# Patient Record
Sex: FEMALE | Race: BLACK OR AFRICAN AMERICAN | NOT HISPANIC OR LATINO
[De-identification: names, ages, dates, MRNs, and addresses within clinical notes are randomized per-mention and may not be internally consistent; named-entity substitution may affect disease eponyms.]

---

## 2017-07-12 ENCOUNTER — APPOINTMENT (OUTPATIENT)
Dept: CV DIAGNOSITCS | Facility: HOSPITAL | Age: 57
End: 2017-07-12

## 2017-07-12 ENCOUNTER — OUTPATIENT (OUTPATIENT)
Dept: OUTPATIENT SERVICES | Facility: HOSPITAL | Age: 57
LOS: 1 days | End: 2017-07-12
Payer: COMMERCIAL

## 2017-07-12 DIAGNOSIS — I25.10 ATHEROSCLEROTIC HEART DISEASE OF NATIVE CORONARY ARTERY WITHOUT ANGINA PECTORIS: ICD-10-CM

## 2017-07-12 PROCEDURE — 93306 TTE W/DOPPLER COMPLETE: CPT | Mod: 26

## 2017-07-12 PROCEDURE — 93306 TTE W/DOPPLER COMPLETE: CPT

## 2017-07-24 ENCOUNTER — EMERGENCY (EMERGENCY)
Facility: HOSPITAL | Age: 57
LOS: 1 days | Discharge: HOME | End: 2017-07-24
Attending: EMERGENCY MEDICINE | Admitting: EMERGENCY MEDICINE
Payer: MEDICAID

## 2017-07-24 VITALS
RESPIRATION RATE: 18 BRPM | DIASTOLIC BLOOD PRESSURE: 65 MMHG | TEMPERATURE: 97 F | SYSTOLIC BLOOD PRESSURE: 120 MMHG | HEART RATE: 62 BPM | OXYGEN SATURATION: 100 %

## 2017-07-24 VITALS
TEMPERATURE: 98 F | DIASTOLIC BLOOD PRESSURE: 65 MMHG | SYSTOLIC BLOOD PRESSURE: 146 MMHG | OXYGEN SATURATION: 100 % | HEART RATE: 68 BPM | RESPIRATION RATE: 18 BRPM

## 2017-07-24 LAB
ALBUMIN SERPL ELPH-MCNC: 4 G/DL — SIGNIFICANT CHANGE UP (ref 3.3–5)
ALP SERPL-CCNC: 56 U/L — SIGNIFICANT CHANGE UP (ref 40–120)
ALT FLD-CCNC: 21 U/L — SIGNIFICANT CHANGE UP (ref 4–33)
AST SERPL-CCNC: 20 U/L — SIGNIFICANT CHANGE UP (ref 4–32)
BASOPHILS # BLD AUTO: 0.01 K/UL — SIGNIFICANT CHANGE UP (ref 0–0.2)
BASOPHILS NFR BLD AUTO: 0.1 % — SIGNIFICANT CHANGE UP (ref 0–2)
BILIRUB SERPL-MCNC: 0.2 MG/DL — SIGNIFICANT CHANGE UP (ref 0.2–1.2)
BUN SERPL-MCNC: 10 MG/DL — SIGNIFICANT CHANGE UP (ref 7–23)
CALCIUM SERPL-MCNC: 9.4 MG/DL — SIGNIFICANT CHANGE UP (ref 8.4–10.5)
CHLORIDE SERPL-SCNC: 95 MMOL/L — LOW (ref 98–107)
CO2 SERPL-SCNC: 25 MMOL/L — SIGNIFICANT CHANGE UP (ref 22–31)
CREAT SERPL-MCNC: 0.54 MG/DL — SIGNIFICANT CHANGE UP (ref 0.5–1.3)
EOSINOPHIL # BLD AUTO: 0.19 K/UL — SIGNIFICANT CHANGE UP (ref 0–0.5)
EOSINOPHIL NFR BLD AUTO: 2.6 % — SIGNIFICANT CHANGE UP (ref 0–6)
GLUCOSE SERPL-MCNC: 83 MG/DL — SIGNIFICANT CHANGE UP (ref 70–99)
HCT VFR BLD CALC: 32.9 % — LOW (ref 34.5–45)
HGB BLD-MCNC: 11.1 G/DL — LOW (ref 11.5–15.5)
IMM GRANULOCYTES # BLD AUTO: 0.04 # — SIGNIFICANT CHANGE UP
IMM GRANULOCYTES NFR BLD AUTO: 0.5 % — SIGNIFICANT CHANGE UP (ref 0–1.5)
LYMPHOCYTES # BLD AUTO: 1.62 K/UL — SIGNIFICANT CHANGE UP (ref 1–3.3)
LYMPHOCYTES # BLD AUTO: 22.1 % — SIGNIFICANT CHANGE UP (ref 13–44)
MCHC RBC-ENTMCNC: 29.5 PG — SIGNIFICANT CHANGE UP (ref 27–34)
MCHC RBC-ENTMCNC: 33.7 % — SIGNIFICANT CHANGE UP (ref 32–36)
MCV RBC AUTO: 87.5 FL — SIGNIFICANT CHANGE UP (ref 80–100)
MONOCYTES # BLD AUTO: 0.76 K/UL — SIGNIFICANT CHANGE UP (ref 0–0.9)
MONOCYTES NFR BLD AUTO: 10.4 % — SIGNIFICANT CHANGE UP (ref 2–14)
NEUTROPHILS # BLD AUTO: 4.7 K/UL — SIGNIFICANT CHANGE UP (ref 1.8–7.4)
NEUTROPHILS NFR BLD AUTO: 64.3 % — SIGNIFICANT CHANGE UP (ref 43–77)
NRBC # FLD: 0 — SIGNIFICANT CHANGE UP
PLATELET # BLD AUTO: 225 K/UL — SIGNIFICANT CHANGE UP (ref 150–400)
PMV BLD: 9.5 FL — SIGNIFICANT CHANGE UP (ref 7–13)
POTASSIUM SERPL-MCNC: 3.5 MMOL/L — SIGNIFICANT CHANGE UP (ref 3.5–5.3)
POTASSIUM SERPL-SCNC: 3.5 MMOL/L — SIGNIFICANT CHANGE UP (ref 3.5–5.3)
PROT SERPL-MCNC: 7.2 G/DL — SIGNIFICANT CHANGE UP (ref 6–8.3)
RBC # BLD: 3.76 M/UL — LOW (ref 3.8–5.2)
RBC # FLD: 13.8 % — SIGNIFICANT CHANGE UP (ref 10.3–14.5)
SODIUM SERPL-SCNC: 137 MMOL/L — SIGNIFICANT CHANGE UP (ref 135–145)
WBC # BLD: 7.32 K/UL — SIGNIFICANT CHANGE UP (ref 3.8–10.5)
WBC # FLD AUTO: 7.32 K/UL — SIGNIFICANT CHANGE UP (ref 3.8–10.5)

## 2017-07-24 PROCEDURE — 99284 EMERGENCY DEPT VISIT MOD MDM: CPT

## 2017-07-24 PROCEDURE — 70481 CT ORBIT/EAR/FOSSA W/DYE: CPT | Mod: 26

## 2017-07-24 RX ORDER — DIPHENHYDRAMINE HCL 50 MG
50 CAPSULE ORAL ONCE
Qty: 0 | Refills: 0 | Status: COMPLETED | OUTPATIENT
Start: 2017-07-24 | End: 2017-07-24

## 2017-07-24 RX ADMIN — Medication 50 MILLIGRAM(S): at 21:36

## 2017-07-24 NOTE — ED PROVIDER NOTE - MEDICAL DECISION MAKING DETAILS
likely allergic in nature, clinically very unlikely periorbital or orbital cellulitis. however, pt's son requesting CT anyway. will get labs, ct. benadryl.

## 2017-07-24 NOTE — ED PROVIDER NOTE - FAMILY HISTORY
Father  Still living? Unknown  Family history of diabetes mellitus (DM), Age at diagnosis: Age Unknown     Mother  Still living? Unknown  Family history of cancer, Age at diagnosis: Age Unknown

## 2017-07-24 NOTE — ED PROVIDER NOTE - PROGRESS NOTE DETAILS
labs normal, pending ct. Stewart note:  sts is improved for patient.  Ct orbits show cellulitis.. will treat w clindamycin 300 mg tid, prednisone 40 qd x 4 day. and benadryl 25 every six hours

## 2017-07-24 NOTE — ED PROVIDER NOTE - CARE PLAN
Principal Discharge DX:	Periorbital edema of right eye Principal Discharge DX:	Periorbital edema of right eye  Instructions for follow-up, activity and diet:	Follow up with your pmd.  Take clindamycin 300 mg 3 x a day.  Take prednisone 40mg daily for 4 days.  Take benadryl every 6 hours for swelling.

## 2017-07-24 NOTE — ED PROVIDER NOTE - PLAN OF CARE
Follow up with your pmd.  Take clindamycin 300 mg 3 x a day.  Take prednisone 40mg daily for 4 days.  Take benadryl every 6 hours for swelling.

## 2017-07-24 NOTE — ED PROVIDER NOTE - SHIFT CHANGE DETAILS
I have signed over this patient to the above attending physician. Pertinent history, physical exam findings and workup thus far in the ED have been discussed. The pending tests and plan, including ct results were signed over.  All questions from the above attending physician have been answered.

## 2017-07-24 NOTE — ED PROVIDER NOTE - OBJECTIVE STATEMENT
57f, pmhx htn, dm. c/o right eye swelling x 1 month. got worse last night, so went to PMD who sent her to ED for a CT r/o orbital cellulitis. swelling above the eyelid has since resolved but below remains. no f/c, minimal pain. no insect bite exposures. no h/a, no visuyal changhes, no eye redness. no pain with eom.

## 2017-07-25 RX ADMIN — Medication 125 MILLIGRAM(S): at 01:25

## 2017-10-02 ENCOUNTER — APPOINTMENT (OUTPATIENT)
Dept: INTERNAL MEDICINE | Facility: CLINIC | Age: 57
End: 2017-10-02

## 2019-01-10 ENCOUNTER — RESULT REVIEW (OUTPATIENT)
Age: 59
End: 2019-01-10

## 2019-04-17 NOTE — ED PROVIDER NOTE - SHIFT CHANGE
Problem: Mobility Impaired (Adult and Pediatric) Goal: *Acute Goals and Plan of Care (Insert Text) Description Physical Therapy Goals Initiated 4/15/2019 1. Patient will move from supine to sit and sit to supine , scoot up and down and roll side to side in bed with modified independence within 7 day(s). 2.  Patient will transfer from bed to chair and chair to bed with modified independence using the least restrictive device within 7 day(s). 3.  Patient will perform sit to stand with modified independence within 7 day(s). 4.  Patient will ambulate with minimal assistance/contact guard assist for 5 feet with rolling walker within 7 day(s). Note: PHYSICAL THERAPY TREATMENT Patient: Chente Pinedo (27 y.o. female) Date: 4/17/2019 Diagnosis: UTI (urinary tract infection) [N39.0] Hypertensive urgency [I16.0] Hyperglycemia [R73.9] Candidal intertrigo [B37.2] UTI (urinary tract infection) [N39.0] UTI (urinary tract infection) Precautions:   
Chart, physical therapy assessment, plan of care and goals were reviewed. ASSESSMENT: 
Pt comes to sit with min to mod assist.Pt to stand with bed elevated min assist of 2. Pt took 4 steps to bedside chair with RW min assist of 2. Pt left siting. Pt is unsteady needing cues for upright posture. Pt progressing slowly. continue goals. Progression toward goals: 
?    Improving appropriately and progressing toward goals ? Improving slowly and progressing toward goals ? Not making progress toward goals and plan of care will be adjusted PLAN: 
Patient continues to benefit from skilled intervention to address the above impairments. Continue treatment per established plan of care. Discharge Recommendations:  Rodriguez Hagan Further Equipment Recommendations for Discharge:  rolling walker SUBJECTIVE:  
 
 
OBJECTIVE DATA SUMMARY:  
Critical Behavior: 
Neurologic State: Alert, Eyes open spontaneously Orientation Level: Oriented X4, Appropriate for age Cognition: Appropriate decision making, Appropriate for age attention/concentration, Appropriate safety awareness, Follows commands Safety/Judgement: Awareness of environment Functional Mobility Training: 
Bed Mobility: 
  
Supine to Sit: Minimum assistance; Moderate assistance Transfers: 
Sit to Stand: Minimum assistance;Assist x2 Stand to Sit: Minimum assistance;Assist x2 Balance: 
Sitting: Intact Standing: Impaired; With support Pain: 
Pain Scale 1: Numeric (0 - 10) Pain Intensity 1: 0 Pain Location 1: Head 
Pain Orientation 1: Anterior Pain Description 1: Aching Pain Intervention(s) 1: Medication (see MAR) Activity Tolerance:  
Pt tolerated treatment fairly well. Please refer to the flowsheet for vital signs taken during this treatment. After treatment:  
?    Patient left in no apparent distress sitting up in chair ? Patient left in no apparent distress in bed 
? Call bell left within reach ? Nursing notified ? Caregiver present ? Bed alarm activated COMMUNICATION/COLLABORATION:  
The patient?s plan of care was discussed with: Physical Therapist 
 
Barber Clemons PTA Time Calculation: 23 mins Yes...

## 2019-07-01 ENCOUNTER — OUTPATIENT (OUTPATIENT)
Dept: OUTPATIENT SERVICES | Facility: HOSPITAL | Age: 59
LOS: 1 days | End: 2019-07-01
Payer: MEDICAID

## 2019-07-01 PROCEDURE — G9001: CPT

## 2019-07-25 ENCOUNTER — INPATIENT (INPATIENT)
Facility: HOSPITAL | Age: 59
LOS: 0 days | Discharge: ANOTHER HOSPITAL | End: 2019-07-26
Attending: INTERNAL MEDICINE | Admitting: INTERNAL MEDICINE
Payer: MEDICAID

## 2019-07-25 VITALS
OXYGEN SATURATION: 100 % | DIASTOLIC BLOOD PRESSURE: 80 MMHG | RESPIRATION RATE: 16 BRPM | HEART RATE: 66 BPM | SYSTOLIC BLOOD PRESSURE: 168 MMHG | TEMPERATURE: 98 F

## 2019-07-25 DIAGNOSIS — I10 ESSENTIAL (PRIMARY) HYPERTENSION: ICD-10-CM

## 2019-07-25 DIAGNOSIS — E11.9 TYPE 2 DIABETES MELLITUS WITHOUT COMPLICATIONS: ICD-10-CM

## 2019-07-25 DIAGNOSIS — R55 SYNCOPE AND COLLAPSE: ICD-10-CM

## 2019-07-25 LAB
ALBUMIN SERPL ELPH-MCNC: 4.4 G/DL — SIGNIFICANT CHANGE UP (ref 3.3–5)
ALP SERPL-CCNC: 64 U/L — SIGNIFICANT CHANGE UP (ref 40–120)
ALT FLD-CCNC: 23 U/L — SIGNIFICANT CHANGE UP (ref 4–33)
ANION GAP SERPL CALC-SCNC: 16 MMO/L — HIGH (ref 7–14)
AST SERPL-CCNC: 24 U/L — SIGNIFICANT CHANGE UP (ref 4–32)
BASOPHILS # BLD AUTO: 0.03 K/UL — SIGNIFICANT CHANGE UP (ref 0–0.2)
BASOPHILS NFR BLD AUTO: 0.4 % — SIGNIFICANT CHANGE UP (ref 0–2)
BILIRUB SERPL-MCNC: 0.4 MG/DL — SIGNIFICANT CHANGE UP (ref 0.2–1.2)
BUN SERPL-MCNC: 10 MG/DL — SIGNIFICANT CHANGE UP (ref 7–23)
CALCIUM SERPL-MCNC: 9.4 MG/DL — SIGNIFICANT CHANGE UP (ref 8.4–10.5)
CHLORIDE SERPL-SCNC: 96 MMOL/L — LOW (ref 98–107)
CO2 SERPL-SCNC: 24 MMOL/L — SIGNIFICANT CHANGE UP (ref 22–31)
CREAT SERPL-MCNC: 0.52 MG/DL — SIGNIFICANT CHANGE UP (ref 0.5–1.3)
EOSINOPHIL # BLD AUTO: 0.22 K/UL — SIGNIFICANT CHANGE UP (ref 0–0.5)
EOSINOPHIL NFR BLD AUTO: 2.9 % — SIGNIFICANT CHANGE UP (ref 0–6)
GLUCOSE SERPL-MCNC: 161 MG/DL — HIGH (ref 70–99)
HCT VFR BLD CALC: 39.2 % — SIGNIFICANT CHANGE UP (ref 34.5–45)
HGB BLD-MCNC: 13 G/DL — SIGNIFICANT CHANGE UP (ref 11.5–15.5)
IMM GRANULOCYTES NFR BLD AUTO: 0.4 % — SIGNIFICANT CHANGE UP (ref 0–1.5)
LYMPHOCYTES # BLD AUTO: 1.65 K/UL — SIGNIFICANT CHANGE UP (ref 1–3.3)
LYMPHOCYTES # BLD AUTO: 22 % — SIGNIFICANT CHANGE UP (ref 13–44)
MCHC RBC-ENTMCNC: 29.1 PG — SIGNIFICANT CHANGE UP (ref 27–34)
MCHC RBC-ENTMCNC: 33.2 % — SIGNIFICANT CHANGE UP (ref 32–36)
MCV RBC AUTO: 87.9 FL — SIGNIFICANT CHANGE UP (ref 80–100)
MONOCYTES # BLD AUTO: 0.78 K/UL — SIGNIFICANT CHANGE UP (ref 0–0.9)
MONOCYTES NFR BLD AUTO: 10.4 % — SIGNIFICANT CHANGE UP (ref 2–14)
NEUTROPHILS # BLD AUTO: 4.78 K/UL — SIGNIFICANT CHANGE UP (ref 1.8–7.4)
NEUTROPHILS NFR BLD AUTO: 63.9 % — SIGNIFICANT CHANGE UP (ref 43–77)
NRBC # FLD: 0 K/UL — SIGNIFICANT CHANGE UP (ref 0–0)
PLATELET # BLD AUTO: 244 K/UL — SIGNIFICANT CHANGE UP (ref 150–400)
PMV BLD: 8.4 FL — SIGNIFICANT CHANGE UP (ref 7–13)
POTASSIUM SERPL-MCNC: 3.4 MMOL/L — LOW (ref 3.5–5.3)
POTASSIUM SERPL-SCNC: 3.4 MMOL/L — LOW (ref 3.5–5.3)
PROT SERPL-MCNC: 7.6 G/DL — SIGNIFICANT CHANGE UP (ref 6–8.3)
RBC # BLD: 4.46 M/UL — SIGNIFICANT CHANGE UP (ref 3.8–5.2)
RBC # FLD: 13.3 % — SIGNIFICANT CHANGE UP (ref 10.3–14.5)
SODIUM SERPL-SCNC: 136 MMOL/L — SIGNIFICANT CHANGE UP (ref 135–145)
TROPONIN T, HIGH SENSITIVITY: 46 NG/L — SIGNIFICANT CHANGE UP (ref ?–14)
TROPONIN T, HIGH SENSITIVITY: 47 NG/L — SIGNIFICANT CHANGE UP (ref ?–14)
WBC # BLD: 7.49 K/UL — SIGNIFICANT CHANGE UP (ref 3.8–10.5)
WBC # FLD AUTO: 7.49 K/UL — SIGNIFICANT CHANGE UP (ref 3.8–10.5)

## 2019-07-25 PROCEDURE — 71046 X-RAY EXAM CHEST 2 VIEWS: CPT | Mod: 26

## 2019-07-25 PROCEDURE — 70450 CT HEAD/BRAIN W/O DYE: CPT | Mod: 26

## 2019-07-25 PROCEDURE — 93306 TTE W/DOPPLER COMPLETE: CPT | Mod: 26

## 2019-07-25 RX ORDER — SODIUM CHLORIDE 9 MG/ML
1000 INJECTION, SOLUTION INTRAVENOUS
Refills: 0 | Status: DISCONTINUED | OUTPATIENT
Start: 2019-07-25 | End: 2019-07-26

## 2019-07-25 RX ORDER — GLUCAGON INJECTION, SOLUTION 0.5 MG/.1ML
1 INJECTION, SOLUTION SUBCUTANEOUS ONCE
Refills: 0 | Status: DISCONTINUED | OUTPATIENT
Start: 2019-07-25 | End: 2019-07-26

## 2019-07-25 RX ORDER — POTASSIUM CHLORIDE 20 MEQ
40 PACKET (EA) ORAL ONCE
Refills: 0 | Status: COMPLETED | OUTPATIENT
Start: 2019-07-25 | End: 2019-07-25

## 2019-07-25 RX ORDER — DEXTROSE 50 % IN WATER 50 %
15 SYRINGE (ML) INTRAVENOUS ONCE
Refills: 0 | Status: DISCONTINUED | OUTPATIENT
Start: 2019-07-25 | End: 2019-07-26

## 2019-07-25 RX ORDER — HEPARIN SODIUM 5000 [USP'U]/ML
5000 INJECTION INTRAVENOUS; SUBCUTANEOUS
Refills: 0 | Status: DISCONTINUED | OUTPATIENT
Start: 2019-07-25 | End: 2019-07-26

## 2019-07-25 RX ORDER — ACETAMINOPHEN 500 MG
975 TABLET ORAL ONCE
Refills: 0 | Status: COMPLETED | OUTPATIENT
Start: 2019-07-25 | End: 2019-07-25

## 2019-07-25 RX ORDER — DEXTROSE 50 % IN WATER 50 %
25 SYRINGE (ML) INTRAVENOUS ONCE
Refills: 0 | Status: DISCONTINUED | OUTPATIENT
Start: 2019-07-25 | End: 2019-07-26

## 2019-07-25 RX ORDER — ATORVASTATIN CALCIUM 80 MG/1
10 TABLET, FILM COATED ORAL AT BEDTIME
Refills: 0 | Status: DISCONTINUED | OUTPATIENT
Start: 2019-07-25 | End: 2019-07-26

## 2019-07-25 RX ORDER — ESCITALOPRAM OXALATE 10 MG/1
10 TABLET, FILM COATED ORAL DAILY
Refills: 0 | Status: DISCONTINUED | OUTPATIENT
Start: 2019-07-25 | End: 2019-07-26

## 2019-07-25 RX ORDER — ACETAMINOPHEN 500 MG
650 TABLET ORAL ONCE
Refills: 0 | Status: COMPLETED | OUTPATIENT
Start: 2019-07-25 | End: 2019-07-25

## 2019-07-25 RX ORDER — ASPIRIN/CALCIUM CARB/MAGNESIUM 324 MG
81 TABLET ORAL DAILY
Refills: 0 | Status: DISCONTINUED | OUTPATIENT
Start: 2019-07-25 | End: 2019-07-26

## 2019-07-25 RX ORDER — INSULIN LISPRO 100/ML
VIAL (ML) SUBCUTANEOUS
Refills: 0 | Status: DISCONTINUED | OUTPATIENT
Start: 2019-07-25 | End: 2019-07-26

## 2019-07-25 RX ORDER — DEXTROSE 50 % IN WATER 50 %
12.5 SYRINGE (ML) INTRAVENOUS ONCE
Refills: 0 | Status: DISCONTINUED | OUTPATIENT
Start: 2019-07-25 | End: 2019-07-26

## 2019-07-25 RX ORDER — LISINOPRIL 2.5 MG/1
10 TABLET ORAL DAILY
Refills: 0 | Status: DISCONTINUED | OUTPATIENT
Start: 2019-07-25 | End: 2019-07-26

## 2019-07-25 RX ORDER — HYDROCHLOROTHIAZIDE 25 MG
25 TABLET ORAL DAILY
Refills: 0 | Status: DISCONTINUED | OUTPATIENT
Start: 2019-07-25 | End: 2019-07-26

## 2019-07-25 RX ORDER — GABAPENTIN 400 MG/1
300 CAPSULE ORAL DAILY
Refills: 0 | Status: DISCONTINUED | OUTPATIENT
Start: 2019-07-25 | End: 2019-07-26

## 2019-07-25 RX ADMIN — ATORVASTATIN CALCIUM 10 MILLIGRAM(S): 80 TABLET, FILM COATED ORAL at 22:08

## 2019-07-25 RX ADMIN — Medication 650 MILLIGRAM(S): at 10:07

## 2019-07-25 RX ADMIN — Medication 650 MILLIGRAM(S): at 10:37

## 2019-07-25 RX ADMIN — Medication 40 MILLIEQUIVALENT(S): at 14:52

## 2019-07-25 RX ADMIN — LISINOPRIL 10 MILLIGRAM(S): 2.5 TABLET ORAL at 16:30

## 2019-07-25 RX ADMIN — GABAPENTIN 300 MILLIGRAM(S): 400 CAPSULE ORAL at 16:30

## 2019-07-25 RX ADMIN — Medication 25 MILLIGRAM(S): at 16:30

## 2019-07-25 RX ADMIN — Medication 81 MILLIGRAM(S): at 16:30

## 2019-07-25 NOTE — ED ADULT NURSE NOTE - CHIEF COMPLAINT QUOTE
pt. BIBA from home, per son, pt. was in a "heated argument" w/ her daughter, slumped down on the couch and her arms/legs began to shake and she was observed to be diaphoretic. Son suspects pt. may be in psychogenic shock bc this event has occurred once before and was brought upon by anxiety as well. Pt. slow to answer questions, reports headache, shoulder pain and "uneasiness" in the chest. PMHx HTN, DM.  # [id number].

## 2019-07-25 NOTE — ED PROVIDER NOTE - OBJECTIVE STATEMENT
Shadi RYAN MD PGY2: 59 F PMH HLD HTN here for syncopal episode and consequent head, neck and chest discomfort all much improved since coming here. Patient was arguing with her daughter in a heated argument when she sat down and her eyes rolled back and slowly regained consciousness while her son called 911. States that this happened a few years ago under similar circumstances. Upon awakening was complaining of head, neck and chest discomfort, all of which has improved but not eliminated. Translation and collateral information provided by patient's son.

## 2019-07-25 NOTE — H&P ADULT - ATTENDING COMMENTS
Patient seen and examined.  Agree with above.   -60 yo F with HTN, HLD admitted with chest pain, diaphoresis, near syncope after getting in an argument with a family member  -Troponin indeterminate; pt. currently chest pain free  -Check CTH   -Check TTE and NST    Talita Mendoza MD

## 2019-07-25 NOTE — ED PROVIDER NOTE - ATTENDING CONTRIBUTION TO CARE
MD Garnica:  I performed a face to face bedside interview with patient regarding history of present illness, review of symptoms and past medical history. I completed an independent physical exam(documented below).  I have discussed patient's plan of care with resident.   I agree with note as stated above, having amended the EMR as needed to reflect my findings. I have discussed the assessment and plan of care.  This includes during the time I functioned as the attending physician for this patient.  PE:  Gen: Alert, NAD  Head: NC, AT,  EOMI, normal lids/conjunctiva  ENT:  normal hearing, patent oropharynx without erythema/exudate  Neck: +supple, no tenderness/meningismus/JVD, +Trachea midline  Chest: no chest wall tenderness, equal chest rise  Pulm: Bilateral BS, normal resp effort, no wheeze/stridor/retractions  CV: RRR, no M/R/G, +dist pulses  Abd: +BS, soft, NT/ND  Rectal: deferred  Mskel: no edema/erythema/cyanosis  Skin: no rash  Neuro: AAOx3, no sensory/motor deficits, CN 2-12 intact   MDM:   58yo F w/ pmh htn, hld, head/neck/chest discomfort during a verbal altercation with daughter. Pt reportedly sat down mid argument at approx 12am, "eyes rolled back", ?LOC, no seizure like activity/bowel or bladder incontinence. no recent head trauma, not on AC. ECG, labs, admission for syncope/ACS w/u.

## 2019-07-25 NOTE — H&P ADULT - HISTORY OF PRESENT ILLNESS
60 yo F HTN, chol, DM here for near syncope. Patient was arguing with her daughter at 12am when +acute onset nausea, diaphoresis, palpitations, dizziness, and mild chest pressure. As per son no LOC.  NO chest pain, SOB, AGARWAL, PND, orthopnea, syncope, increased lower extremity edema, fever chills, malaise, myalgias, anorexia, weight changes ( loss or gain), night sweats, generalized fatigue abdominal pain, N/V/C/D BRBPR, melena, urinary symptoms, cough, and wheezing.

## 2019-07-25 NOTE — ED PROVIDER NOTE - PMH
Benign hypertension    Diabetes mellitus, type II    Gall stone pancreatitis  S/P laparoscopic cholecystectomy  Low back pain

## 2019-07-25 NOTE — ED ADULT NURSE NOTE - OBJECTIVE STATEMENT
pt a&Ox3, had a "heated argument" with daughter as per son at bedside whom pt is requesting to translate. pt slumped down in chair, as per son denies LOC or head trauma, pt eyes remained open and pts UE & LE were "shaking." pt states she does not remember the events. pt has had a hx of one episode like this in the past. pt has hx of htn, dm, anxiety. 20 G IV placedin L AC, labs sent, will continue to monitor.

## 2019-07-25 NOTE — ED ADULT TRIAGE NOTE - CHIEF COMPLAINT QUOTE
pt. BIBA from home, per son, pt. was in a "heated argument" w/ her daughter, slumped down on the couch and her arms/legs began to shake and she was observed to be diaphoretic. Son suspects pt. may be in psychogenic shock bc this event has occurred once before and was brought upon by anxiety as well. Pt. slow to answer questions, reports headache, shoulder pain and "uneasiness" in the chest. PMHx HTN, DM.  # [id number].

## 2019-07-25 NOTE — ED PROVIDER NOTE - PHYSICAL EXAMINATION
Shadi RYAN MD PGY2:   PHYSICAL EXAM:    GENERAL: Thin older woman, NAD  HEENT:  Atraumatic, Normocephalic  CHEST/LUNG: Chest rise equal bilaterally. CTAB.   HEART: Regular rate and rhythm. No murmurs.   ABDOMEN: Soft, Nontender, Nondistended  EXTREMITIES:  2+ Peripheral Pulses.  PSYCH: A&Ox3  SKIN: No obvious rashes or lesions

## 2019-07-25 NOTE — H&P ADULT - PROBLEM SELECTOR PLAN 1
r/o acute coronary syndrome   check orthostatics  TTE ordered  TST ordered  CE x 3, telemetry , serial EKG's  continue asa, lipitor  case discussed extensively with Dr Mendoza

## 2019-07-25 NOTE — H&P ADULT - NSICDXPASTMEDICALHX_GEN_ALL_CORE_FT
PAST MEDICAL HISTORY:  Benign hypertension     Diabetes mellitus, type II     Gall stone pancreatitis S/P laparoscopic cholecystectomy    Low back pain

## 2019-07-26 ENCOUNTER — INPATIENT (INPATIENT)
Facility: HOSPITAL | Age: 59
LOS: 6 days | Discharge: HOME | DRG: 236 | End: 2019-08-02
Attending: THORACIC SURGERY (CARDIOTHORACIC VASCULAR SURGERY) | Admitting: THORACIC SURGERY (CARDIOTHORACIC VASCULAR SURGERY)
Payer: MEDICAID

## 2019-07-26 ENCOUNTER — TRANSCRIPTION ENCOUNTER (OUTPATIENT)
Age: 59
End: 2019-07-26

## 2019-07-26 VITALS — RESPIRATION RATE: 17 BRPM | OXYGEN SATURATION: 100 % | HEART RATE: 64 BPM

## 2019-07-26 VITALS
HEART RATE: 63 BPM | DIASTOLIC BLOOD PRESSURE: 88 MMHG | TEMPERATURE: 99 F | SYSTOLIC BLOOD PRESSURE: 158 MMHG | RESPIRATION RATE: 20 BRPM | OXYGEN SATURATION: 98 %

## 2019-07-26 DIAGNOSIS — I25.10 ATHEROSCLEROTIC HEART DISEASE OF NATIVE CORONARY ARTERY WITHOUT ANGINA PECTORIS: ICD-10-CM

## 2019-07-26 DIAGNOSIS — Z29.9 ENCOUNTER FOR PROPHYLACTIC MEASURES, UNSPECIFIED: ICD-10-CM

## 2019-07-26 DIAGNOSIS — Z71.89 OTHER SPECIFIED COUNSELING: ICD-10-CM

## 2019-07-26 LAB
ANION GAP SERPL CALC-SCNC: 12 MMO/L — SIGNIFICANT CHANGE UP (ref 7–14)
ANION GAP SERPL CALC-SCNC: 13 MMO/L — SIGNIFICANT CHANGE UP (ref 7–14)
BASOPHILS # BLD AUTO: 0 K/UL — SIGNIFICANT CHANGE UP (ref 0–0.2)
BASOPHILS # BLD AUTO: 0.04 K/UL — SIGNIFICANT CHANGE UP (ref 0–0.2)
BASOPHILS NFR BLD AUTO: 0.1 % — SIGNIFICANT CHANGE UP (ref 0–2)
BASOPHILS NFR BLD AUTO: 0.8 % — SIGNIFICANT CHANGE UP (ref 0–2)
BLD GP AB SCN SERPL QL: NEGATIVE — SIGNIFICANT CHANGE UP
BUN SERPL-MCNC: 11 MG/DL — SIGNIFICANT CHANGE UP (ref 7–23)
BUN SERPL-MCNC: 14 MG/DL — SIGNIFICANT CHANGE UP (ref 7–23)
CALCIUM SERPL-MCNC: 10.1 MG/DL — SIGNIFICANT CHANGE UP (ref 8.4–10.5)
CALCIUM SERPL-MCNC: 9.1 MG/DL — SIGNIFICANT CHANGE UP (ref 8.4–10.5)
CHLORIDE SERPL-SCNC: 96 MMOL/L — LOW (ref 98–107)
CHLORIDE SERPL-SCNC: 98 MMOL/L — SIGNIFICANT CHANGE UP (ref 98–107)
CHOLEST SERPL-MCNC: 128 MG/DL — SIGNIFICANT CHANGE UP (ref 120–199)
CO2 SERPL-SCNC: 26 MMOL/L — SIGNIFICANT CHANGE UP (ref 22–31)
CO2 SERPL-SCNC: 26 MMOL/L — SIGNIFICANT CHANGE UP (ref 22–31)
CREAT SERPL-MCNC: 0.49 MG/DL — LOW (ref 0.5–1.3)
CREAT SERPL-MCNC: 0.55 MG/DL — SIGNIFICANT CHANGE UP (ref 0.5–1.3)
EOSINOPHIL # BLD AUTO: 0.1 K/UL — SIGNIFICANT CHANGE UP (ref 0–0.5)
EOSINOPHIL # BLD AUTO: 0.28 K/UL — SIGNIFICANT CHANGE UP (ref 0–0.5)
EOSINOPHIL NFR BLD AUTO: 1.8 % — SIGNIFICANT CHANGE UP (ref 0–6)
EOSINOPHIL NFR BLD AUTO: 5.5 % — SIGNIFICANT CHANGE UP (ref 0–6)
GLUCOSE BLDC GLUCOMTR-MCNC: 118 MG/DL — HIGH (ref 70–99)
GLUCOSE BLDC GLUCOMTR-MCNC: 172 MG/DL — HIGH (ref 70–99)
GLUCOSE SERPL-MCNC: 127 MG/DL — HIGH (ref 70–99)
GLUCOSE SERPL-MCNC: 156 MG/DL — HIGH (ref 70–99)
HBA1C BLD-MCNC: 6.9 % — HIGH (ref 4–5.6)
HCT VFR BLD CALC: 38.1 % — SIGNIFICANT CHANGE UP (ref 34.5–45)
HCT VFR BLD CALC: 39.6 % — SIGNIFICANT CHANGE UP (ref 34.5–45)
HDLC SERPL-MCNC: 62 MG/DL — SIGNIFICANT CHANGE UP (ref 45–65)
HGB BLD-MCNC: 12.3 G/DL — SIGNIFICANT CHANGE UP (ref 11.5–15.5)
HGB BLD-MCNC: 13.2 G/DL — SIGNIFICANT CHANGE UP (ref 11.5–15.5)
IMM GRANULOCYTES NFR BLD AUTO: 0.4 % — SIGNIFICANT CHANGE UP (ref 0–1.5)
LIPID PNL WITH DIRECT LDL SERPL: 60 MG/DL — SIGNIFICANT CHANGE UP
LYMPHOCYTES # BLD AUTO: 1.5 K/UL — SIGNIFICANT CHANGE UP (ref 1–3.3)
LYMPHOCYTES # BLD AUTO: 1.9 K/UL — SIGNIFICANT CHANGE UP (ref 1–3.3)
LYMPHOCYTES # BLD AUTO: 26.8 % — SIGNIFICANT CHANGE UP (ref 13–44)
LYMPHOCYTES # BLD AUTO: 29.3 % — SIGNIFICANT CHANGE UP (ref 13–44)
MAGNESIUM SERPL-MCNC: 1.9 MG/DL — SIGNIFICANT CHANGE UP (ref 1.6–2.6)
MAGNESIUM SERPL-MCNC: 2 MG/DL — SIGNIFICANT CHANGE UP (ref 1.6–2.6)
MCHC RBC-ENTMCNC: 28.8 PG — SIGNIFICANT CHANGE UP (ref 27–34)
MCHC RBC-ENTMCNC: 29.9 PG — SIGNIFICANT CHANGE UP (ref 27–34)
MCHC RBC-ENTMCNC: 32.3 % — SIGNIFICANT CHANGE UP (ref 32–36)
MCHC RBC-ENTMCNC: 33.2 GM/DL — SIGNIFICANT CHANGE UP (ref 32–36)
MCV RBC AUTO: 89.2 FL — SIGNIFICANT CHANGE UP (ref 80–100)
MCV RBC AUTO: 90.1 FL — SIGNIFICANT CHANGE UP (ref 80–100)
MONOCYTES # BLD AUTO: 0.65 K/UL — SIGNIFICANT CHANGE UP (ref 0–0.9)
MONOCYTES # BLD AUTO: 0.8 K/UL — SIGNIFICANT CHANGE UP (ref 0–0.9)
MONOCYTES NFR BLD AUTO: 10.5 % — SIGNIFICANT CHANGE UP (ref 2–14)
MONOCYTES NFR BLD AUTO: 12.7 % — SIGNIFICANT CHANGE UP (ref 2–14)
NEUTROPHILS # BLD AUTO: 2.63 K/UL — SIGNIFICANT CHANGE UP (ref 1.8–7.4)
NEUTROPHILS # BLD AUTO: 4.4 K/UL — SIGNIFICANT CHANGE UP (ref 1.8–7.4)
NEUTROPHILS NFR BLD AUTO: 51.3 % — SIGNIFICANT CHANGE UP (ref 43–77)
NEUTROPHILS NFR BLD AUTO: 60.9 % — SIGNIFICANT CHANGE UP (ref 43–77)
NRBC # FLD: 0 K/UL — SIGNIFICANT CHANGE UP (ref 0–0)
PHOSPHATE SERPL-MCNC: 2.9 MG/DL — SIGNIFICANT CHANGE UP (ref 2.5–4.5)
PLATELET # BLD AUTO: 230 K/UL — SIGNIFICANT CHANGE UP (ref 150–400)
PLATELET # BLD AUTO: 267 K/UL — SIGNIFICANT CHANGE UP (ref 150–400)
PMV BLD: 8.6 FL — SIGNIFICANT CHANGE UP (ref 7–13)
POTASSIUM SERPL-MCNC: 3.2 MMOL/L — LOW (ref 3.5–5.3)
POTASSIUM SERPL-MCNC: 3.9 MMOL/L — SIGNIFICANT CHANGE UP (ref 3.5–5.3)
POTASSIUM SERPL-SCNC: 3.2 MMOL/L — LOW (ref 3.5–5.3)
POTASSIUM SERPL-SCNC: 3.9 MMOL/L — SIGNIFICANT CHANGE UP (ref 3.5–5.3)
RBC # BLD: 4.27 M/UL — SIGNIFICANT CHANGE UP (ref 3.8–5.2)
RBC # BLD: 4.4 M/UL — SIGNIFICANT CHANGE UP (ref 3.8–5.2)
RBC # FLD: 12.5 % — SIGNIFICANT CHANGE UP (ref 10.3–14.5)
RBC # FLD: 13.7 % — SIGNIFICANT CHANGE UP (ref 10.3–14.5)
RH IG SCN BLD-IMP: NEGATIVE — SIGNIFICANT CHANGE UP
RH IG SCN BLD-IMP: NEGATIVE — SIGNIFICANT CHANGE UP
SODIUM SERPL-SCNC: 135 MMOL/L — SIGNIFICANT CHANGE UP (ref 135–145)
SODIUM SERPL-SCNC: 136 MMOL/L — SIGNIFICANT CHANGE UP (ref 135–145)
TRIGL SERPL-MCNC: 81 MG/DL — SIGNIFICANT CHANGE UP (ref 10–149)
TSH SERPL-MCNC: 2.6 UIU/ML — SIGNIFICANT CHANGE UP (ref 0.27–4.2)
WBC # BLD: 5.12 K/UL — SIGNIFICANT CHANGE UP (ref 3.8–10.5)
WBC # BLD: 7.2 K/UL — SIGNIFICANT CHANGE UP (ref 3.8–10.5)
WBC # FLD AUTO: 5.12 K/UL — SIGNIFICANT CHANGE UP (ref 3.8–10.5)
WBC # FLD AUTO: 7.2 K/UL — SIGNIFICANT CHANGE UP (ref 3.8–10.5)

## 2019-07-26 PROCEDURE — 93458 L HRT ARTERY/VENTRICLE ANGIO: CPT | Mod: 26

## 2019-07-26 PROCEDURE — 93016 CV STRESS TEST SUPVJ ONLY: CPT | Mod: GC

## 2019-07-26 PROCEDURE — 93018 CV STRESS TEST I&R ONLY: CPT | Mod: GC

## 2019-07-26 PROCEDURE — 33967 INSERT I-AORT PERCUT DEVICE: CPT

## 2019-07-26 PROCEDURE — 78452 HT MUSCLE IMAGE SPECT MULT: CPT | Mod: 26

## 2019-07-26 PROCEDURE — 76937 US GUIDE VASCULAR ACCESS: CPT | Mod: 26

## 2019-07-26 PROCEDURE — 99291 CRITICAL CARE FIRST HOUR: CPT

## 2019-07-26 PROCEDURE — 71045 X-RAY EXAM CHEST 1 VIEW: CPT | Mod: 26

## 2019-07-26 RX ORDER — HEPARIN SODIUM 5000 [USP'U]/ML
800 INJECTION INTRAVENOUS; SUBCUTANEOUS
Qty: 25000 | Refills: 0 | Status: DISCONTINUED | OUTPATIENT
Start: 2019-07-26 | End: 2019-07-27

## 2019-07-26 RX ORDER — METOPROLOL TARTRATE 50 MG
25 TABLET ORAL
Refills: 0 | Status: DISCONTINUED | OUTPATIENT
Start: 2019-07-26 | End: 2019-07-27

## 2019-07-26 RX ORDER — POTASSIUM CHLORIDE 20 MEQ
40 PACKET (EA) ORAL ONCE
Refills: 0 | Status: COMPLETED | OUTPATIENT
Start: 2019-07-26 | End: 2019-07-26

## 2019-07-26 RX ORDER — CEFUROXIME AXETIL 250 MG
1500 TABLET ORAL ONCE
Refills: 0 | Status: COMPLETED | OUTPATIENT
Start: 2019-07-26 | End: 2019-07-26

## 2019-07-26 RX ORDER — HEPARIN SODIUM 5000 [USP'U]/ML
800 INJECTION INTRAVENOUS; SUBCUTANEOUS
Qty: 25000 | Refills: 0 | Status: DISCONTINUED | OUTPATIENT
Start: 2019-07-26 | End: 2019-07-26

## 2019-07-26 RX ORDER — ATORVASTATIN CALCIUM 80 MG/1
80 TABLET, FILM COATED ORAL AT BEDTIME
Refills: 0 | Status: DISCONTINUED | OUTPATIENT
Start: 2019-07-26 | End: 2019-07-26

## 2019-07-26 RX ORDER — ACETAMINOPHEN 500 MG
650 TABLET ORAL EVERY 6 HOURS
Refills: 0 | Status: DISCONTINUED | OUTPATIENT
Start: 2019-07-26 | End: 2019-07-27

## 2019-07-26 RX ORDER — METOPROLOL TARTRATE 50 MG
25 TABLET ORAL
Refills: 0 | Status: DISCONTINUED | OUTPATIENT
Start: 2019-07-26 | End: 2019-07-26

## 2019-07-26 RX ORDER — PANTOPRAZOLE SODIUM 20 MG/1
40 TABLET, DELAYED RELEASE ORAL DAILY
Refills: 0 | Status: DISCONTINUED | OUTPATIENT
Start: 2019-07-26 | End: 2019-07-27

## 2019-07-26 RX ORDER — SODIUM CHLORIDE 9 MG/ML
3 INJECTION INTRAMUSCULAR; INTRAVENOUS; SUBCUTANEOUS EVERY 8 HOURS
Refills: 0 | Status: DISCONTINUED | OUTPATIENT
Start: 2019-07-26 | End: 2019-07-27

## 2019-07-26 RX ORDER — ACETAMINOPHEN 500 MG
650 TABLET ORAL ONCE
Refills: 0 | Status: COMPLETED | OUTPATIENT
Start: 2019-07-26 | End: 2019-07-26

## 2019-07-26 RX ORDER — CHLORHEXIDINE GLUCONATE 213 G/1000ML
1 SOLUTION TOPICAL ONCE
Refills: 0 | Status: COMPLETED | OUTPATIENT
Start: 2019-07-27 | End: 2019-07-27

## 2019-07-26 RX ORDER — CHLORHEXIDINE GLUCONATE 213 G/1000ML
15 SOLUTION TOPICAL ONCE
Refills: 0 | Status: DISCONTINUED | OUTPATIENT
Start: 2019-07-26 | End: 2019-07-27

## 2019-07-26 RX ADMIN — Medication 1: at 12:56

## 2019-07-26 RX ADMIN — Medication 650 MILLIGRAM(S): at 22:00

## 2019-07-26 RX ADMIN — SODIUM CHLORIDE 3 MILLILITER(S): 9 INJECTION INTRAMUSCULAR; INTRAVENOUS; SUBCUTANEOUS at 23:18

## 2019-07-26 RX ADMIN — ESCITALOPRAM OXALATE 10 MILLIGRAM(S): 10 TABLET, FILM COATED ORAL at 15:36

## 2019-07-26 RX ADMIN — Medication 25 MILLIGRAM(S): at 23:22

## 2019-07-26 RX ADMIN — GABAPENTIN 300 MILLIGRAM(S): 400 CAPSULE ORAL at 12:57

## 2019-07-26 RX ADMIN — Medication 100 MILLIGRAM(S): at 23:23

## 2019-07-26 RX ADMIN — Medication 25 MILLIGRAM(S): at 05:51

## 2019-07-26 RX ADMIN — ATORVASTATIN CALCIUM 80 MILLIGRAM(S): 80 TABLET, FILM COATED ORAL at 21:20

## 2019-07-26 RX ADMIN — Medication 40 MILLIEQUIVALENT(S): at 12:57

## 2019-07-26 RX ADMIN — LISINOPRIL 10 MILLIGRAM(S): 2.5 TABLET ORAL at 05:51

## 2019-07-26 RX ADMIN — Medication 81 MILLIGRAM(S): at 12:57

## 2019-07-26 RX ADMIN — HEPARIN SODIUM 5000 UNIT(S): 5000 INJECTION INTRAVENOUS; SUBCUTANEOUS at 05:51

## 2019-07-26 RX ADMIN — HEPARIN SODIUM 8 UNIT(S)/HR: 5000 INJECTION INTRAVENOUS; SUBCUTANEOUS at 23:18

## 2019-07-26 RX ADMIN — Medication 650 MILLIGRAM(S): at 21:20

## 2019-07-26 NOTE — PROGRESS NOTE ADULT - PROBLEM SELECTOR PLAN 2
- r/o acute coronary syndrome; trops were equivocal X2 (46 and 47)  - TTE showed normal LV systolic function and no significant changes when compared to echo from 2015  - NST abnormal  - left heart cath showed 95% occlusion of left main and RCA  - continue to monitor on telemetry  - continue ASA and lipitor Hgb was elevated at 6.9  - holding glimeperide, januvia, and metformin  Monitor blood sugars  [place] insulin sliding scale  Low carbohydrate diet  Diabetes education  Nutrition consult

## 2019-07-26 NOTE — PROGRESS NOTE ADULT - ASSESSMENT
58 yo F w/PMH of HTN, HLD, DM who p/w pre-syncope, discovered to have 95% occlusion in her left main and RCA; now awaiting transfer to [place] for CABG. 60 yo F w/PMH of HTN, HLD, DM and anxiety/depression who p/w pre-syncope, discovered to have 95% occlusion in her left main and RCA; now awaiting transfer to [place] for CABG. 60 yo F w/PMH of HTN, HLD, DM and anxiety/depression who p/w pre-syncope, discovered to have 95% occlusion in her left main and RCA s/p Intra Aortic Balloon Pump via    ,now awaiting transfer to [place] for CABG.

## 2019-07-26 NOTE — PROGRESS NOTE ADULT - PROBLEM SELECTOR PLAN 3
Hgb was elevated at 6.9  - holding glimeperide, januvia, and metformin Hgb was elevated at 6.9  - holding glimeperide, januvia, and metformin  Monitor blood sugars  [place]ispro insulin sliding scale  Low carbohydrate diet  Diabetes education  Nutrition consult - diet orders placed  Intra Aortic Balloon Pump pressure 160-170 - c/w lisinopril and metoprolol

## 2019-07-26 NOTE — PROGRESS NOTE ADULT - PROBLEM SELECTOR PLAN 1
NST was abnormal, and left heart cath showed 95% occlusion of left main and RCA. Will likely require transfer to [place] for CABG.  - continue ASA, lipitor, and heparin NST was abnormal, and left heart cath showed 95% occlusion of left main and RCA. Will likely require transfer to [place] for CABG.  - continue ASA, lipitor, and heparin  - c/w IABP NST was abnormal, and left heart cath showed 95% occlusion of left main and RCA. s/p Intra Aortic Balloon Pump via    groin  Aug 1:1  monitor for groin hematoma or bleed  Vascular check q1.  Will likely require transfer to [place] for CABG.  - continue ASA, lipitor, and heparin -NST was abnormal, and left heart cath showed 95% occlusion of left main and RCA. s/p Intra Aortic Balloon Pump via    groin  Aug 1:1  monitor for groin hematoma or bleed  Vascular check q1.  Will  transfer to [place] for CABG.  - continue ASA, lipitor, and heparin drip

## 2019-07-26 NOTE — PROGRESS NOTE ADULT - ATTENDING COMMENTS
Patient seen and examined.  Agree with above.   -Admitted with chest pain, found to have abnormal NST  -Cath today    Talita Mendoza MD

## 2019-07-26 NOTE — PROGRESS NOTE ADULT - SUBJECTIVE AND OBJECTIVE BOX
August Castaneda  PGY-1  Pager: [phone]      PATIENT:  LUCIAN VARELA  [id number]    CHIEF COMPLAINT:  Patient is a 59y old Female who presents with a chief complaint of near syncopal episode following an argument.    HPI:    59-year-old female with PMH of Diabetes, HLD, and HTN who p/w a near syncopal episode after an argument with her daughter. She experienced acute onset of nausea, diaphoresis, palpitations, dizziness, and mild chest pressure. Has had a similar episode in the past. Troponins were equivocal and an echocardiogram revealed normal LV systolic function with estimated EF 60-65%; no significant changes when compared to echocardiogram in 2015. NST was abnormal and patient was subsequently referred for left heart cath, which revealed 95% occlusion of both her left main and RCA. Patient is expected to be transferred [place] for CABG.    INTERVAL HISTORY/OVERNIGHT EVENTS:    Patient was transferred to the CCU at around 6:30PM. She was seen and examined at bedside. Currently denies CP, palpitations, SOB, HA, dizziness, lightheadedness, fever, chills, nausea, vomiting, coughing, wheezing, and abdominal pain.      MEDICATIONS:  MEDICATIONS  (STANDING):  aspirin enteric coated 81 milliGRAM(s) Oral daily  atorvastatin 80 milliGRAM(s) Oral at bedtime  dextrose 5%. 1000 milliLiter(s) (50 mL/Hr) IV Continuous <Continuous>  dextrose 50% Injectable 12.5 Gram(s) IV Push once  dextrose 50% Injectable 25 Gram(s) IV Push once  dextrose 50% Injectable 25 Gram(s) IV Push once  escitalopram 10 milliGRAM(s) Oral daily  gabapentin 300 milliGRAM(s) Oral daily  heparin  Infusion 800 Unit(s)/Hr (8 mL/Hr) IV Continuous <Continuous>  insulin lispro (HumaLOG) corrective regimen sliding scale   SubCutaneous three times a day before meals  lisinopril 10 milliGRAM(s) Oral daily  metoprolol tartrate 25 milliGRAM(s) Oral two times a day    MEDICATIONS  (PRN):  dextrose 40% Gel 15 Gram(s) Oral once PRN Blood Glucose LESS THAN 70 milliGRAM(s)/deciliter  glucagon  Injectable 1 milliGRAM(s) IntraMuscular once PRN Glucose LESS THAN 70 milligrams/deciliter      ALLERGIES:  Allergies    metronidazole (Rash)    Intolerances    Norvasc (Swelling)      OBJECTIVE:  ICU Vital Signs Last 24 Hrs  T(C): 36.7 (2019 18:30), Max: 36.7 (2019 05:50)  T(F): 98 (2019 18:30), Max: 98 (2019 05:50)  HR: 66 (2019 18:30) (63 - 66)  BP: 118/50 (2019 05:50) (118/50 - 120/59)  BP(mean): --  ABP: --  ABP(mean): --  RR: 14 (2019 18:30) (14 - 18)  SpO2: 100% (2019 18:30) (97% - 100%)      POCT Blood Glucose.: 172 mg/dL (2019 12:49)    CAPILLARY BLOOD GLUCOSE      POCT Blood Glucose.: 172 mg/dL (2019 12:49)    I&O's Summary    2019 07:01  -  2019 07:00  --------------------------------------------------------  IN: 100 mL / OUT: 0 mL / NET: 100 mL      Daily     Daily Weight in k.1 (2019 18:30)    PHYSICAL EXAMINATION:  General: Appears stated age, NAD  HEENT: EOMI, normal sclera and conjunctiva  Lungs: CTAB, no wheezes, rales, or rhonchi  Cardiac: normal S1, S2, RRR, no murmurs, rubs or gallops  Abdomen: Soft, nontender, nondistended, bowel sounds present  Extremities: No LE edema      LABS:                          12.3   5.12  )-----------( 230      ( 2019 06:53 )             38.1         136  |  98  |  14  ----------------------------<  156<H>  3.2<L>   |  26  |  0.49<L>    Ca    9.1      2019 06:53  Phos  2.9       Mg     1.9         TPro  7.6  /  Alb  4.4  /  TBili  0.4  /  DBili  x   /  AST  24  /  ALT  23  /  AlkPhos  64  07-25    LIVER FUNCTIONS - ( 2019 04:50 )  Alb: 4.4 g/dL / Pro: 7.6 g/dL / ALK PHOS: 64 u/L / ALT: 23 u/L / AST: 24 u/L / GGT: x             PROECDURE:    PROCEDURE: Transthoracic echocardiogram with 2-D, M-Mode  and complete spectral and color flow Doppler.  INDICATION: Abnormal electrocardiogram (ECG) (EKG)  (R94.31), Syncope and collapse (R55)  ------------------------------------------------------------------------  DIMENSIONS:  Dimensions:     Normal Values:  LA:     2.8 cm    2.0 - 4.0 cm  Ao:     2.8 cm    2.0 - 3.8 cm  SEPTUM: 1.1 cm    0.6 - 1.2 cm  PWT:    1.1 cm    0.6 - 1.1 cm  LVIDd:  3.5 cm    3.0 - 5.6 cm  LVIDs:  2.3 cm    1.8 - 4.0 cm  Derived Variables:  LVMI: 74 g/m2  RWT: 0.62  Fractional short: 34 %  Ejection Fraction (Visual Estimate): 60-65 %  Ejection Fraction (Teicholtz): 64 %  Peak Velocity (m/sec): AoV=1.3  ------------------------------------------------------------------------  OBSERVATIONS:  Mitral Valve: Mitral annular calcification, otherwise  normal mitral valve. No mitral regurgitation seen.  Aortic Root: Aortic Root: 2.8 cm.  Aortic Valve: Aortic valve not well visualized. Peak  transaortic valve gradient equals 6 mm Hg. Peak left  ventricular outflow tract gradient equals 3.2 mm Hg.  Left Atrium: Normal left atrium.  LA volume index = 29  cc/m2.  Left Ventricle: Normal left ventricular systolic function.  No segmental wall motion abnormalities. Increased relative  wall thickness with normal left ventricular mass index,  consistent with concentric left ventricular remodeling.  Normal left ventricular diastolic function.  Right Heart: Right atrium not well visualized. Normal right  ventricular size and function. Tricuspid valve not well  visualized. Minimal tricuspid regurgitation. Pulmonic valve  not well visualized.  Pericardium/PleuraNormal pericardium with trace pericardial  effusion.  Hemodynamic: Estimated right ventricular systolic pressure  equals 35 mm Hg, assuming right atrial pressure equals 10  mm Hg, consistent with borderline pulmonary hypertension.  ------------------------------------------------------------------------  CONCLUSIONS:  1. Mitral annular calcification, otherwise normal mitral  valve. No mitral regurgitation seen.  2. Increased relative wall thickness with normal left  ventricular mass index, consistent with concentric left  ventricular remodeling.  3. Normal left ventricular systolic function. No segmental  wall motion abnormalities.  4. Normal left ventricular diastolic function.  5. Normal right ventricular size and function.  *** Compared with echocardiogram of 2015, no  significant changes noted. August Castaneda  PGY-1  Pager: [phone]      PATIENT:  LUCIAN VARELA  [id number]    CHIEF COMPLAINT:  Patient is a 59y old Female who presents with a chief complaint of near syncopal episode    HPI:    59-year-old female with PMH of Diabetes, HLD, and HTN who p/w a near syncopal episode after an argument with her daughter. She experienced acute onset of nausea, diaphoresis, palpitations, dizziness, and mild chest pressure. Has had a similar episode in the past. Troponins were equivocal and an echocardiogram revealed normal LV systolic function with estimated EF 60-65%; no significant changes when compared to echocardiogram in 2015. NST was abnormal and patient was subsequently referred for left heart cath, which revealed 95% occlusion of both her left main and RCA. Patient is expected to be transferred [place] for CABG.    INTERVAL HISTORY/OVERNIGHT EVENTS:    Patient was transferred to the CCU at around 6:30PM. She was seen and examined at bedside. Currently denies CP, palpitations, SOB, HA, dizziness, lightheadedness, fever, chills, nausea, vomiting, coughing, wheezing, and abdominal pain.      MEDICATIONS:  MEDICATIONS  (STANDING):  aspirin enteric coated 81 milliGRAM(s) Oral daily  atorvastatin 80 milliGRAM(s) Oral at bedtime  dextrose 5%. 1000 milliLiter(s) (50 mL/Hr) IV Continuous <Continuous>  dextrose 50% Injectable 12.5 Gram(s) IV Push once  dextrose 50% Injectable 25 Gram(s) IV Push once  dextrose 50% Injectable 25 Gram(s) IV Push once  escitalopram 10 milliGRAM(s) Oral daily  gabapentin 300 milliGRAM(s) Oral daily  heparin  Infusion 800 Unit(s)/Hr (8 mL/Hr) IV Continuous <Continuous>  insulin lispro (HumaLOG) corrective regimen sliding scale   SubCutaneous three times a day before meals  lisinopril 10 milliGRAM(s) Oral daily  metoprolol tartrate 25 milliGRAM(s) Oral two times a day    MEDICATIONS  (PRN):  dextrose 40% Gel 15 Gram(s) Oral once PRN Blood Glucose LESS THAN 70 milliGRAM(s)/deciliter  glucagon  Injectable 1 milliGRAM(s) IntraMuscular once PRN Glucose LESS THAN 70 milligrams/deciliter      ALLERGIES:  Allergies    metronidazole (Rash)    Intolerances    Norvasc (Swelling)      OBJECTIVE:  ICU Vital Signs Last 24 Hrs  T(C): 36.7 (2019 18:30), Max: 36.7 (2019 05:50)  T(F): 98 (2019 18:30), Max: 98 (2019 05:50)  HR: 66 (2019 18:30) (63 - 66)  BP: 118/50 (2019 05:50) (118/50 - 120/59)  BP(mean): --  ABP: --  ABP(mean): --  RR: 14 (2019 18:30) (14 - 18)  SpO2: 100% (2019 18:30) (97% - 100%)      POCT Blood Glucose.: 172 mg/dL (2019 12:49)    CAPILLARY BLOOD GLUCOSE      POCT Blood Glucose.: 172 mg/dL (2019 12:49)    I&O's Summary    2019 07:01  -  2019 07:00  --------------------------------------------------------  IN: 100 mL / OUT: 0 mL / NET: 100 mL      Daily     Daily Weight in k.1 (2019 18:30)    PHYSICAL EXAMINATION:  General: Appears stated age, NAD  HEENT: EOMI, normal sclera and conjunctiva  Lungs: CTAB, no wheezes, rales, or rhonchi  Cardiac: normal S1, S2, RRR, no murmurs, rubs or gallops  Abdomen: Soft, nontender, nondistended, bowel sounds present  Extremities: No LE edema      LABS:                          12.3   5.12  )-----------( 230      ( 2019 06:53 )             38.1     -    136  |  98  |  14  ----------------------------<  156<H>  3.2<L>   |  26  |  0.49<L>    Ca    9.1      2019 06:53  Phos  2.9       Mg     1.9         TPro  7.6  /  Alb  4.4  /  TBili  0.4  /  DBili  x   /  AST  24  /  ALT  23  /  AlkPhos  64  07-25    LIVER FUNCTIONS - ( 2019 04:50 )  Alb: 4.4 g/dL / Pro: 7.6 g/dL / ALK PHOS: 64 u/L / ALT: 23 u/L / AST: 24 u/L / GGT: x             PROECDURE:    PROCEDURE: Transthoracic echocardiogram with 2-D, M-Mode  and complete spectral and color flow Doppler.  INDICATION: Abnormal electrocardiogram (ECG) (EKG)  (R94.31), Syncope and collapse (R55)  ------------------------------------------------------------------------  DIMENSIONS:  Dimensions:     Normal Values:  LA:     2.8 cm    2.0 - 4.0 cm  Ao:     2.8 cm    2.0 - 3.8 cm  SEPTUM: 1.1 cm    0.6 - 1.2 cm  PWT:    1.1 cm    0.6 - 1.1 cm  LVIDd:  3.5 cm    3.0 - 5.6 cm  LVIDs:  2.3 cm    1.8 - 4.0 cm  Derived Variables:  LVMI: 74 g/m2  RWT: 0.62  Fractional short: 34 %  Ejection Fraction (Visual Estimate): 60-65 %  Ejection Fraction (Teicholtz): 64 %  Peak Velocity (m/sec): AoV=1.3  ------------------------------------------------------------------------  OBSERVATIONS:  Mitral Valve: Mitral annular calcification, otherwise  normal mitral valve. No mitral regurgitation seen.  Aortic Root: Aortic Root: 2.8 cm.  Aortic Valve: Aortic valve not well visualized. Peak  transaortic valve gradient equals 6 mm Hg. Peak left  ventricular outflow tract gradient equals 3.2 mm Hg.  Left Atrium: Normal left atrium.  LA volume index = 29  cc/m2.  Left Ventricle: Normal left ventricular systolic function.  No segmental wall motion abnormalities. Increased relative  wall thickness with normal left ventricular mass index,  consistent with concentric left ventricular remodeling.  Normal left ventricular diastolic function.  Right Heart: Right atrium not well visualized. Normal right  ventricular size and function. Tricuspid valve not well  visualized. Minimal tricuspid regurgitation. Pulmonic valve  not well visualized.  Pericardium/PleuraNormal pericardium with trace pericardial  effusion.  Hemodynamic: Estimated right ventricular systolic pressure  equals 35 mm Hg, assuming right atrial pressure equals 10  mm Hg, consistent with borderline pulmonary hypertension.  ------------------------------------------------------------------------  CONCLUSIONS:  1. Mitral annular calcification, otherwise normal mitral  valve. No mitral regurgitation seen.  2. Increased relative wall thickness with normal left  ventricular mass index, consistent with concentric left  ventricular remodeling.  3. Normal left ventricular systolic function. No segmental  wall motion abnormalities.  4. Normal left ventricular diastolic function.  5. Normal right ventricular size and function.  *** Compared with echocardiogram of 2015, no  significant changes noted. August Csataneda  PGY-1  Pager: [phone]      PATIENT:  LUCIAN VARELA  [id number]    CHIEF COMPLAINT:  Patient is a 59y old Female who presents with a chief complaint of near syncopal episode    HPI:    59-year-old female with PMH of Diabetes, HLD, HTN and depression/anxiety who p/w a near syncopal episode after an argument with her daughter. She experienced acute onset of nausea, diaphoresis, palpitations, dizziness, and mild chest pressure. Has had a similar episode in the past. Troponins were equivocal and an echocardiogram revealed normal LV systolic function with estimated EF 60-65%; no significant changes when compared to echocardiogram in 2015. NST was abnormal and patient was subsequently referred for left heart cath, which revealed 95% occlusion of both her left main and RCA, s/p IABP. Patient is expected to be transferred [place] for CABG.    INTERVAL HISTORY/OVERNIGHT EVENTS:    Patient was transferred to the CCU at around 6:30PM. She was seen and examined at bedside. Currently denies CP, palpitations, SOB, HA, dizziness, lightheadedness, fever, chills, nausea, vomiting, coughing, wheezing, and abdominal pain.      MEDICATIONS :home meds ;    valsartan-hydrochlorothiazide 320mg-25mg oral tablet: Last Dose Taken:  , 1 tab(s) orally once a day  · 	metFORMIN 1000 mg oral tablet: Last Dose Taken:  , 1 tab(s) orally 2 times a day  · 	gabapentin 300 mg oral tablet: Last Dose Taken:  , 1 milligram(s) orally once a day  · 	glimepiride 2 mg oral tablet: Last Dose Taken:  , 1 tab(s) orally 2 times a day  · 	Lipitor 10 mg oral tablet: Last Dose Taken:  , 1 tab(s) orally once a day  · 	Januvia 100 mg oral tablet: Last Dose Taken:  , 1 tab(s) orally once a day  · 	Aspirin Enteric Coated 81 mg oral delayed release tablet: Last Dose Taken:  , 1 tab(s) orally once a day  · 	escitalopram 10 mg oral tablet: Last Dose Taken:  , 1 tab(s) orally once a day      MEDICATIONS  (STANDING):  aspirin enteric coated 81 milliGRAM(s) Oral daily  atorvastatin 80 milliGRAM(s) Oral at bedtime  dextrose 5%. 1000 milliLiter(s) (50 mL/Hr) IV Continuous <Continuous>  dextrose 50% Injectable 12.5 Gram(s) IV Push once  dextrose 50% Injectable 25 Gram(s) IV Push once  dextrose 50% Injectable 25 Gram(s) IV Push once  escitalopram 10 milliGRAM(s) Oral daily  gabapentin 300 milliGRAM(s) Oral daily  heparin  Infusion 800 Unit(s)/Hr (8 mL/Hr) IV Continuous <Continuous>  insulin lispro (HumaLOG) corrective regimen sliding scale   SubCutaneous three times a day before meals  lisinopril 10 milliGRAM(s) Oral daily  metoprolol tartrate 25 milliGRAM(s) Oral two times a day    MEDICATIONS  (PRN):  dextrose 40% Gel 15 Gram(s) Oral once PRN Blood Glucose LESS THAN 70 milliGRAM(s)/deciliter  glucagon  Injectable 1 milliGRAM(s) IntraMuscular once PRN Glucose LESS THAN 70 milligrams/deciliter      ALLERGIES:  metronidazole (Rash)    Intolerances    Norvasc (Swelling)      OBJECTIVE:  ICU Vital Signs Last 24 Hrs  T(C): 36.7 (2019 18:30), Max: 36.7 (2019 05:50)  T(F): 98 (2019 18:30), Max: 98 (2019 05:50)  HR: 66 (2019 18:30) (63 - 66)  BP: 118/50 (2019 05:50) (118/50 - 120/59)-  RR: 14 (2019 18:30) (14 - 18)  SpO2: 100% (2019 18:30) (97% - 100%)      POCT Blood Glucose.: 172 mg/dL (2019 12:49)    CAPILLARY BLOOD GLUCOSE      POCT Blood Glucose.: 172 mg/dL (2019 12:49)    I&O's Summary    2019 07:01  -  2019 07:00  --------------------------------------------------------  IN: 100 mL / OUT: 0 mL / NET: 100 mL      Daily     Daily Weight in k.1 (2019 18:30)    REVIEW OF SYSTEMS    General: no fatigue/malaise, weight loss/gain.  Skin: no rashes.  Ophthalmologic: no blurred vision, no loss of vision. 	  ENT: no sore throat, rhinorrhea, sinus congestion.  Cardiovascular:no chest pain ,no palpitation,no dizziness,no diaphoresis,no edema  Respiratory: no SOB, cough or wheeze.  Gastrointestinal:  no N/V/D, no melena/hematemesis/hematochezia.  Genitourinary: no dysuria/hesitancy or hematuria.  Musculoskeletal: no myalgias or arthralgias.  Neurological: no changes in vision or hearing, no lightheadedness/dizziness, no syncope/near syncope	  Psychiatric: + stress/anxiety      	    PHYSICAL EXAMINATION:  General: Appears stated age, NAD  HEENT: EOMI, normal sclera and conjunctiva  Lungs: CTAB, no wheezes, rales, or rhonchi  Cardiac: normal S1, S2, RRR, no murmurs, rubs or gallops  Abdomen: Soft, nontender, nondistended, bowel sounds present  Extremities: No LE edema, rt groin Intra Aortic Balloon Pump site intact      LABS:                          12.3   5.12  )-----------( 230      ( 2019 06:53 )             38.1     07-26    136  |  98  |  14  ----------------------------<  156<H>  3.2<L>   |  26  |  0.49<L>    Ca    9.1      2019 06:53  Phos  2.9     07-  Mg     1.9     07-    TPro  7.6  /  Alb  4.4  /  TBili  0.4  /  DBili  x   /  AST  24  /  ALT  23  /  AlkPhos  64  07-25    LIVER FUNCTIONS - ( 2019 04:50 )  Alb: 4.4 g/dL / Pro: 7.6 g/dL / ALK PHOS: 64 u/L / ALT: 23 u/L / AST: 24 u/L / GGT: x             PROECDURE:    PROCEDURE: Transthoracic echocardiogram with 2-D, M-Mode  and complete spectral and color flow Doppler.  INDICATION: Abnormal electrocardiogram (ECG) (EKG)  (R94.31), Syncope and collapse (R55)  ------------------------------------------------------------------------  DIMENSIONS:  Dimensions:     Normal Values:  LA:     2.8 cm    2.0 - 4.0 cm  Ao:     2.8 cm    2.0 - 3.8 cm  SEPTUM: 1.1 cm    0.6 - 1.2 cm  PWT:    1.1 cm    0.6 - 1.1 cm  LVIDd:  3.5 cm    3.0 - 5.6 cm  LVIDs:  2.3 cm    1.8 - 4.0 cm  Derived Variables:  LVMI: 74 g/m2  RWT: 0.62  Fractional short: 34 %  Ejection Fraction (Visual Estimate): 60-65 %  Ejection Fraction (Teicholtz): 64 %  Peak Velocity (m/sec): AoV=1.3  ------------------------------------------------------------------------  OBSERVATIONS:  Mitral Valve: Mitral annular calcification, otherwise  normal mitral valve. No mitral regurgitation seen.  Aortic Root: Aortic Root: 2.8 cm.  Aortic Valve: Aortic valve not well visualized. Peak  transaortic valve gradient equals 6 mm Hg. Peak left  ventricular outflow tract gradient equals 3.2 mm Hg.  Left Atrium: Normal left atrium.  LA volume index = 29  cc/m2.  Left Ventricle: Normal left ventricular systolic function.  No segmental wall motion abnormalities. Increased relative  wall thickness with normal left ventricular mass index,  consistent with concentric left ventricular remodeling.  Normal left ventricular diastolic function.  Right Heart: Right atrium not well visualized. Normal right  ventricular size and function. Tricuspid valve not well  visualized. Minimal tricuspid regurgitation. Pulmonic valve  not well visualized.  Pericardium/PleuraNormal pericardium with trace pericardial  effusion.  Hemodynamic: Estimated right ventricular systolic pressure  equals 35 mm Hg, assuming right atrial pressure equals 10  mm Hg, consistent with borderline pulmonary hypertension.  ------------------------------------------------------------------------  CONCLUSIONS:  1. Mitral annular calcification, otherwise normal mitral  valve. No mitral regurgitation seen.  2. Increased relative wall thickness with normal left  ventricular mass index, consistent with concentric left  ventricular remodeling.  3. Normal left ventricular systolic function. No segmental  wall motion abnormalities.  4. Normal left ventricular diastolic function.  5. Normal right ventricular size and function.  *** Compared with echocardiogram of 2015, no  significant changes noted. August Castaneda  PGY-1  Pager: [phone]      PATIENT:  LUCIAN VARELA  [id number]    CHIEF COMPLAINT:  Patient is a 59y old Female who presents with a chief complaint of near syncopal episode    HPI:    59-year-old female with PMH of Diabetes, HLD, HTN and depression/anxiety who p/w a near syncopal episode after an argument with her daughter. She experienced acute onset of nausea, diaphoresis, palpitations, dizziness, and mild chest pressure. Has had a similar episode in the past. Troponins were equivocal and an echocardiogram revealed normal LV systolic function with estimated EF 60-65%; no significant changes when compared to echocardiogram in 2015. NST was abnormal and patient was subsequently referred for left heart cath, which revealed 95% occlusion of both her left main and RCA, s/p IABP. Patient is expected to be transferred [place] for CABG.    INTERVAL HISTORY/OVERNIGHT EVENTS:    Patient was transferred to the CCU at around 6:30PM. She was seen and examined at bedside. Currently denies CP, palpitations, SOB, HA, dizziness, lightheadedness, fever, chills, nausea, vomiting, coughing, wheezing, and abdominal pain.      MEDICATIONS :home meds ;    valsartan-hydrochlorothiazide 320mg-25mg oral tablet: Last Dose Taken:  , 1 tab(s) orally once a day  · 	metFORMIN 1000 mg oral tablet: Last Dose Taken:  , 1 tab(s) orally 2 times a day  · 	gabapentin 300 mg oral tablet: Last Dose Taken:  , 1 milligram(s) orally once a day  · 	glimepiride 2 mg oral tablet: Last Dose Taken:  , 1 tab(s) orally 2 times a day  · 	Lipitor 10 mg oral tablet: Last Dose Taken:  , 1 tab(s) orally once a day  · 	Januvia 100 mg oral tablet: Last Dose Taken:  , 1 tab(s) orally once a day  · 	Aspirin Enteric Coated 81 mg oral delayed release tablet: Last Dose Taken:  , 1 tab(s) orally once a day  · 	escitalopram 10 mg oral tablet: Last Dose Taken:  , 1 tab(s) orally once a day      MEDICATIONS  (STANDING):  aspirin enteric coated 81 milliGRAM(s) Oral daily  atorvastatin 80 milliGRAM(s) Oral at bedtime  dextrose 5%. 1000 milliLiter(s) (50 mL/Hr) IV Continuous <Continuous>  dextrose 50% Injectable 12.5 Gram(s) IV Push once  dextrose 50% Injectable 25 Gram(s) IV Push once  dextrose 50% Injectable 25 Gram(s) IV Push once  escitalopram 10 milliGRAM(s) Oral daily  gabapentin 300 milliGRAM(s) Oral daily  heparin  Infusion 800 Unit(s)/Hr (8 mL/Hr) IV Continuous <Continuous>  insulin lispro (HumaLOG) corrective regimen sliding scale   SubCutaneous three times a day before meals  lisinopril 10 milliGRAM(s) Oral daily  metoprolol tartrate 25 milliGRAM(s) Oral two times a day    MEDICATIONS  (PRN):  dextrose 40% Gel 15 Gram(s) Oral once PRN Blood Glucose LESS THAN 70 milliGRAM(s)/deciliter  glucagon  Injectable 1 milliGRAM(s) IntraMuscular once PRN Glucose LESS THAN 70 milligrams/deciliter      ALLERGIES:  metronidazole (Rash)    Intolerances    Norvasc (Swelling)      OBJECTIVE:  ICU Vital Signs Last 24 Hrs  T(C): 36.7 (2019 18:30), Max: 36.7 (2019 05:50)  T(F): 98 (2019 18:30), Max: 98 (2019 05:50)  HR: 66 (2019 18:30) (63 - 66)  BP: 118/50 (2019 05:50) (118/50 - 120/59)-  RR: 14 (2019 18:30) (14 - 18)  SpO2: 100% (2019 18:30) (97% - 100%)      POCT Blood Glucose.: 172 mg/dL (2019 12:49)    CAPILLARY BLOOD GLUCOSE      POCT Blood Glucose.: 172 mg/dL (2019 12:49)    I&O's Summary    2019 07:01  -  2019 07:00  --------------------------------------------------------  IN: 100 mL / OUT: 0 mL / NET: 100 mL      Daily     Daily Weight in k.1 (2019 18:30)    REVIEW OF SYSTEMS    General: no fatigue/malaise, weight loss/gain.  Skin: no rashes.  Ophthalmologic: no blurred vision, no loss of vision. 	  ENT: no sore throat, rhinorrhea, sinus congestion.  Cardiovascular:no chest pain ,no palpitation,no dizziness,no diaphoresis,no edema  Respiratory: no SOB, cough or wheeze.  Gastrointestinal:  no N/V/D, no melena/hematemesis/hematochezia.  Genitourinary: no dysuria/hesitancy or hematuria.  Musculoskeletal: no myalgias or arthralgias.  Neurological: no changes in vision or hearing, no lightheadedness/dizziness, no syncope/near syncope	  Psychiatric: + stress/anxiety      	    PHYSICAL EXAM:  Appearance: Normal	  HEENT:   Normal oral mucosa, PERRL, EOMI	  Lymphatic: No lymphadenopathy  Cardiovascular: Normal S1 S2, No JVD, No murmurs, No edema  Respiratory: Lungs clear to auscultation	  Psychiatry: A & O x 3, Mood & affect appropriate  Gastrointestinal:  Soft, Non-tender, + BS	  Skin: No rashes, No ecchymoses, No cyanosis	  Neurologic: Non-focal  Extremities: Normal range of motion, No clubbing, cyanosis or edema, rt groin Intra Aortic Balloon Pump site intact  Vascular: Peripheral pulses palpable 2+ bilaterally                LABS:                          12.3   5.12  )-----------( 230      ( 2019 06:53 )             38.1     -    136  |  98  |  14  ----------------------------<  156<H>  3.2<L>   |  26  |  0.49<L>    Ca    9.1      2019 06:53  Phos  2.9     -  Mg     1.9         TPro  7.6  /  Alb  4.4  /  TBili  0.4  /  DBili  x   /  AST  24  /  ALT  23  /  AlkPhos  64  07-25    LIVER FUNCTIONS - ( 2019 04:50 )  Alb: 4.4 g/dL / Pro: 7.6 g/dL / ALK PHOS: 64 u/L / ALT: 23 u/L / AST: 24 u/L / GGT: x             PROECDURE:    PROCEDURE: Transthoracic echocardiogram with 2-D, M-Mode  and complete spectral and color flow Doppler.  INDICATION: Abnormal electrocardiogram (ECG) (EKG)  (R94.31), Syncope and collapse (R55)  ------------------------------------------------------------------------  DIMENSIONS:  Dimensions:     Normal Values:  LA:     2.8 cm    2.0 - 4.0 cm  Ao:     2.8 cm    2.0 - 3.8 cm  SEPTUM: 1.1 cm    0.6 - 1.2 cm  PWT:    1.1 cm    0.6 - 1.1 cm  LVIDd:  3.5 cm    3.0 - 5.6 cm  LVIDs:  2.3 cm    1.8 - 4.0 cm  Derived Variables:  LVMI: 74 g/m2  RWT: 0.62  Fractional short: 34 %  Ejection Fraction (Visual Estimate): 60-65 %  Ejection Fraction (Teicholtz): 64 %  Peak Velocity (m/sec): AoV=1.3  ------------------------------------------------------------------------  OBSERVATIONS:  Mitral Valve: Mitral annular calcification, otherwise  normal mitral valve. No mitral regurgitation seen.  Aortic Root: Aortic Root: 2.8 cm.  Aortic Valve: Aortic valve not well visualized. Peak  transaortic valve gradient equals 6 mm Hg. Peak left  ventricular outflow tract gradient equals 3.2 mm Hg.  Left Atrium: Normal left atrium.  LA volume index = 29  cc/m2.  Left Ventricle: Normal left ventricular systolic function.  No segmental wall motion abnormalities. Increased relative  wall thickness with normal left ventricular mass index,  consistent with concentric left ventricular remodeling.  Normal left ventricular diastolic function.  Right Heart: Right atrium not well visualized. Normal right  ventricular size and function. Tricuspid valve not well  visualized. Minimal tricuspid regurgitation. Pulmonic valve  not well visualized.  Pericardium/PleuraNormal pericardium with trace pericardial  effusion.  Hemodynamic: Estimated right ventricular systolic pressure  equals 35 mm Hg, assuming right atrial pressure equals 10  mm Hg, consistent with borderline pulmonary hypertension.  ------------------------------------------------------------------------  CONCLUSIONS:  1. Mitral annular calcification, otherwise normal mitral  valve. No mitral regurgitation seen.  2. Increased relative wall thickness with normal left  ventricular mass index, consistent with concentric left  ventricular remodeling.  3. Normal left ventricular systolic function. No segmental  wall motion abnormalities.  4. Normal left ventricular diastolic function.  5. Normal right ventricular size and function.  *** Compared with echocardiogram of 2015, no  significant changes noted.

## 2019-07-26 NOTE — H&P ADULT - ASSESSMENT
59 year old female with CAD preop for cabg 7/27 with IABP in place  1. NPO after midnight  2. Heparin drip  3. CBC, CMP, Coags, Cardiac Enzymes, BNP, A1C, TFTs, P2Y12  4. pre-op orders in  5. OR 7/27 in am

## 2019-07-26 NOTE — H&P ADULT - HISTORY OF PRESENT ILLNESS
This patient is a 59 year old Chinese and English speaking female with PMH of HTN, HLD, DM2 who presented to [facility] ED 7/25 with near syncope after arguing with her daughter. Patient also had acute onset nausea, diaphoresis, palpitations, dizziness, and mild chest pressure. NO chest pain, SOB, AGARWAL, PND, orthopnea, syncope, increased lower extremity edema, fever, chills, malaise, myalgias, anorexia, weight changes ( loss or gain), night sweats, generalized fatigue, BRBPR, melena, urinary symptoms, cough, and wheezing.   The patient had cath at [facility] which showed 95% left main and 95% RCA lesions. A right femoral IABP was placed, heparin drip started and patient transferred to [facility] for CABG tomorrow 7/27 with Dr. Ramey.  Upon arrival patient admits to headache and denies CP, SOB, n/v, abdominal pain, lightheadedness, dizziness.

## 2019-07-26 NOTE — PROGRESS NOTE ADULT - SUBJECTIVE AND OBJECTIVE BOX
S:  Patient denies chest pain or shortness of breath.   Review of systems otherwise (-)    	  MEDICATIONS:  MEDICATIONS  (STANDING):  aspirin enteric coated 81 milliGRAM(s) Oral daily  atorvastatin 10 milliGRAM(s) Oral at bedtime  dextrose 5%. 1000 milliLiter(s) (50 mL/Hr) IV Continuous <Continuous>  dextrose 50% Injectable 12.5 Gram(s) IV Push once  dextrose 50% Injectable 25 Gram(s) IV Push once  dextrose 50% Injectable 25 Gram(s) IV Push once  escitalopram 10 milliGRAM(s) Oral daily  gabapentin 300 milliGRAM(s) Oral daily  heparin  Injectable 5000 Unit(s) SubCutaneous two times a day  hydrochlorothiazide 25 milliGRAM(s) Oral daily  insulin lispro (HumaLOG) corrective regimen sliding scale   SubCutaneous three times a day before meals  lisinopril 10 milliGRAM(s) Oral daily    LABS:	 	    CARDIAC MARKERS:                       12.3   5.12  )-----------( 230      ( 26 Jul 2019 06:53 )             38.1     Hemoglobin: 12.3 g/dL (07-26 @ 06:53)  Hemoglobin: 13.0 g/dL (07-25 @ 04:50)      07-26    136  |  98  |  14  ----------------------------<  156<H>  3.2<L>   |  26  |  0.49<L>    Ca    9.1      26 Jul 2019 06:53  Phos  2.9     07-26  Mg     1.9     07-26    TPro  7.6  /  Alb  4.4  /  TBili  0.4  /  DBili  x   /  AST  24  /  ALT  23  /  AlkPhos  64  07-25    Creatinine Trend: 0.49<--, 0.52<--    COAGS:       proBNP:   Lipid Profile:   HgA1c: Hemoglobin A1C, Whole Blood: 6.9 % (07-26 @ 06:53)    TSH: Thyroid Stimulating Hormone, Serum: 2.60 uIU/mL (07-26 @ 06:53)      PHYSICAL EXAM:  T(C): 36.7 (07-26-19 @ 05:50), Max: 36.7 (07-26-19 @ 05:50)  HR: 65 (07-26-19 @ 05:50) (63 - 66)  BP: 118/50 (07-26-19 @ 05:50) (118/50 - 121/59)  RR: 16 (07-26-19 @ 05:50) (16 - 18)  SpO2: 100% (07-26-19 @ 05:50) (97% - 100%)  Wt(kg): --  I&O's Summary    25 Jul 2019 07:01  -  26 Jul 2019 07:00  --------------------------------------------------------  IN: 100 mL / OUT: 0 mL / NET: 100 mL    Weight (kg): 63.8 (07-26 @ 05:50)    Gen: Appears well in NAD  CV: N S1 S2 1/6 AVILA (+)2 Pulses B/l  Resp:  Clear to auscultation B/L, normal effort  GI: (+) BS Soft, NT, ND  Lymph:  (-)Edema, (-)obvious lymphadenopathy  Skin: Warm to touch, Normal turgor  Psych: Appropriate mood and affect      TELEMETRY: 	  NSR 60's     DATA:    < from: Transthoracic Echocardiogram (07.25.19 @ 17:31) >  CONCLUSIONS:  1. Mitral annular calcification, otherwise normal mitral  valve. No mitral regurgitation seen.  2. Increased relative wall thicknesswith normal left  ventricular mass index, consistent with concentric left  ventricular remodeling.  3. Normal left ventricular systolic function. No segmental  wall motion abnormalities.  4. Normal left ventricular diastolic function.  5. Normal right ventricular size and function.  *** Compared with echocardiogram of 6/2/2015, no  significant changes noted.  ------------------------------------------------------------------------  Confirmed on  7/26/2019 - 07:51:14 by Leo Bailey M.D.  ------------------------------------------------------------------------    < end of copied text >      < from: Nuclear Stress Test-Pharmacologic (07.26.19 @ 10:45) >  IMPRESSIONS:Abnormal Study  * Myocardial Perfusion SPECT results are abnormal.  * There is a medium sized, moderate to severe defect in  lateral wall.  * Post-stress gated wall motion analysis was performed  (LVEF > 70%;LVEDV = 37 ml.), revealing overall normal LV  function with focal diminished systolic thickening in the  lateral wall.  Based on the EKG and nuclear findings, rest imaging was  not performed, cardiac catheterization was recommended.  ------------------------------------------------------------------------  Confirmed on  7/26/2019 - 13:50:42 by Judah Garcia M.D.    < end of copied text >  ASSESSMENT/PLAN: 	    58 yo F HTN, chol, DM with pre- syncope. Cardiology following for syncope, r/o ACS.     --  no cp or sob  -- trop 46 -- > 47, indeterminant  -- echo as noted above   -- abnormal NST, plan for cardiac cath today  -- f/u final results of cardiac cath   -- further work up to be discussed with attending S:  Patient denies chest pain or shortness of breath.   Review of systems otherwise (-)    	  MEDICATIONS:  MEDICATIONS  (STANDING):  aspirin enteric coated 81 milliGRAM(s) Oral daily  atorvastatin 10 milliGRAM(s) Oral at bedtime  dextrose 5%. 1000 milliLiter(s) (50 mL/Hr) IV Continuous <Continuous>  dextrose 50% Injectable 12.5 Gram(s) IV Push once  dextrose 50% Injectable 25 Gram(s) IV Push once  dextrose 50% Injectable 25 Gram(s) IV Push once  escitalopram 10 milliGRAM(s) Oral daily  gabapentin 300 milliGRAM(s) Oral daily  heparin  Injectable 5000 Unit(s) SubCutaneous two times a day  hydrochlorothiazide 25 milliGRAM(s) Oral daily  insulin lispro (HumaLOG) corrective regimen sliding scale   SubCutaneous three times a day before meals  lisinopril 10 milliGRAM(s) Oral daily    LABS:	 	    CARDIAC MARKERS:                       12.3   5.12  )-----------( 230      ( 26 Jul 2019 06:53 )             38.1     Hemoglobin: 12.3 g/dL (07-26 @ 06:53)  Hemoglobin: 13.0 g/dL (07-25 @ 04:50)      07-26    136  |  98  |  14  ----------------------------<  156<H>  3.2<L>   |  26  |  0.49<L>    Ca    9.1      26 Jul 2019 06:53  Phos  2.9     07-26  Mg     1.9     07-26    TPro  7.6  /  Alb  4.4  /  TBili  0.4  /  DBili  x   /  AST  24  /  ALT  23  /  AlkPhos  64  07-25    Creatinine Trend: 0.49<--, 0.52<--    COAGS:       proBNP:   Lipid Profile:   HgA1c: Hemoglobin A1C, Whole Blood: 6.9 % (07-26 @ 06:53)    TSH: Thyroid Stimulating Hormone, Serum: 2.60 uIU/mL (07-26 @ 06:53)      PHYSICAL EXAM:  T(C): 36.7 (07-26-19 @ 05:50), Max: 36.7 (07-26-19 @ 05:50)  HR: 65 (07-26-19 @ 05:50) (63 - 66)  BP: 118/50 (07-26-19 @ 05:50) (118/50 - 121/59)  RR: 16 (07-26-19 @ 05:50) (16 - 18)  SpO2: 100% (07-26-19 @ 05:50) (97% - 100%)  Wt(kg): --  I&O's Summary    25 Jul 2019 07:01  -  26 Jul 2019 07:00  --------------------------------------------------------  IN: 100 mL / OUT: 0 mL / NET: 100 mL    Weight (kg): 63.8 (07-26 @ 05:50)    Gen: Appears well in NAD  CV: N S1 S2 1/6 AVILA (+)2 Pulses B/l  Resp:  Clear to auscultation B/L, normal effort  GI: (+) BS Soft, NT, ND  Lymph:  (-)Edema, (-)obvious lymphadenopathy  Skin: Warm to touch, Normal turgor  Psych: Appropriate mood and affect      TELEMETRY: 	  NSR 60's     DATA:    < from: Transthoracic Echocardiogram (07.25.19 @ 17:31) >  CONCLUSIONS:  1. Mitral annular calcification, otherwise normal mitral  valve. No mitral regurgitation seen.  2. Increased relative wall thicknesswith normal left  ventricular mass index, consistent with concentric left  ventricular remodeling.  3. Normal left ventricular systolic function. No segmental  wall motion abnormalities.  4. Normal left ventricular diastolic function.  5. Normal right ventricular size and function.  *** Compared with echocardiogram of 6/2/2015, no  significant changes noted.  ------------------------------------------------------------------------  Confirmed on  7/26/2019 - 07:51:14 by Leo Bailey M.D.  ------------------------------------------------------------------------    < end of copied text >      < from: Nuclear Stress Test-Pharmacologic (07.26.19 @ 10:45) >  IMPRESSIONS:Abnormal Study  * Myocardial Perfusion SPECT results are abnormal.  * There is a medium sized, moderate to severe defect in  lateral wall.  * Post-stress gated wall motion analysis was performed  (LVEF > 70%;LVEDV = 37 ml.), revealing overall normal LV  function with focal diminished systolic thickening in the  lateral wall.  Based on the EKG and nuclear findings, rest imaging was  not performed, cardiac catheterization was recommended.  ------------------------------------------------------------------------  Confirmed on  7/26/2019 - 13:50:42 by Judah Garcia M.D.    < end of copied text >  ASSESSMENT/PLAN: 	    58 yo F HTN, chol, DM with pre- syncope. Cardiology following for syncope, r/o ACS.     --  no cp or sob  -- trop 46 -- > 47, indeterminant  -- echo as noted above   -- no further syncopal episodes, cth negative   -- abnormal NST, plan for cardiac cath today  -- f/u final results of cardiac cath   -- further work up to be discussed with attending

## 2019-07-27 ENCOUNTER — APPOINTMENT (OUTPATIENT)
Age: 59
End: 2019-07-27

## 2019-07-27 LAB
ALBUMIN SERPL ELPH-MCNC: 3.3 G/DL — SIGNIFICANT CHANGE UP (ref 3.3–5)
ALBUMIN SERPL ELPH-MCNC: 4.1 G/DL — SIGNIFICANT CHANGE UP (ref 3.3–5)
ALP SERPL-CCNC: 32 U/L — LOW (ref 40–120)
ALP SERPL-CCNC: 66 U/L — SIGNIFICANT CHANGE UP (ref 40–120)
ALT FLD-CCNC: 13 U/L — SIGNIFICANT CHANGE UP (ref 10–45)
ALT FLD-CCNC: 26 U/L — SIGNIFICANT CHANGE UP (ref 10–45)
ANION GAP SERPL CALC-SCNC: 14 MMOL/L — SIGNIFICANT CHANGE UP (ref 5–17)
ANION GAP SERPL CALC-SCNC: 14 MMOL/L — SIGNIFICANT CHANGE UP (ref 5–17)
APPEARANCE UR: CLEAR — SIGNIFICANT CHANGE UP
APTT BLD: 131.8 SEC — CRITICAL HIGH (ref 27.5–36.3)
APTT BLD: 31.7 SEC — SIGNIFICANT CHANGE UP (ref 27.5–36.3)
APTT BLD: 84.3 SEC — HIGH (ref 27.5–36.3)
AST SERPL-CCNC: 24 U/L — SIGNIFICANT CHANGE UP (ref 10–40)
AST SERPL-CCNC: 27 U/L — SIGNIFICANT CHANGE UP (ref 10–40)
BASE EXCESS BLDV CALC-SCNC: -1 MMOL/L — SIGNIFICANT CHANGE UP (ref -2–2)
BASE EXCESS BLDV CALC-SCNC: -5.5 MMOL/L — LOW (ref -2–2)
BASE EXCESS BLDV CALC-SCNC: 1.3 MMOL/L — SIGNIFICANT CHANGE UP (ref -2–2)
BASE EXCESS BLDV CALC-SCNC: 2.3 MMOL/L — HIGH (ref -2–2)
BASE EXCESS BLDV CALC-SCNC: 3.5 MMOL/L — HIGH (ref -2–2)
BASOPHILS # BLD AUTO: 0 K/UL — SIGNIFICANT CHANGE UP (ref 0–0.2)
BASOPHILS NFR BLD AUTO: 0.2 % — SIGNIFICANT CHANGE UP (ref 0–2)
BILIRUB SERPL-MCNC: 0.5 MG/DL — SIGNIFICANT CHANGE UP (ref 0.2–1.2)
BILIRUB SERPL-MCNC: 1.1 MG/DL — SIGNIFICANT CHANGE UP (ref 0.2–1.2)
BILIRUB UR-MCNC: NEGATIVE — SIGNIFICANT CHANGE UP
BLOOD GAS VENOUS - CREATININE: SIGNIFICANT CHANGE UP MG/DL (ref 0.5–1.3)
BLOOD GAS VENOUS - CREATININE: SIGNIFICANT CHANGE UP MG/DL (ref 0.5–1.3)
BUN SERPL-MCNC: 12 MG/DL — SIGNIFICANT CHANGE UP (ref 7–23)
BUN SERPL-MCNC: 14 MG/DL — SIGNIFICANT CHANGE UP (ref 7–23)
CA-I SERPL-SCNC: 0.79 MMOL/L — LOW (ref 1.12–1.3)
CA-I SERPL-SCNC: 0.92 MMOL/L — LOW (ref 1.12–1.3)
CA-I SERPL-SCNC: 0.93 MMOL/L — LOW (ref 1.12–1.3)
CALCIUM SERPL-MCNC: 8.5 MG/DL — SIGNIFICANT CHANGE UP (ref 8.4–10.5)
CALCIUM SERPL-MCNC: 9.7 MG/DL — SIGNIFICANT CHANGE UP (ref 8.4–10.5)
CHLORIDE BLDV-SCNC: SIGNIFICANT CHANGE UP MMOL/L (ref 96–108)
CHLORIDE SERPL-SCNC: 103 MMOL/L — SIGNIFICANT CHANGE UP (ref 96–108)
CHLORIDE SERPL-SCNC: 97 MMOL/L — SIGNIFICANT CHANGE UP (ref 96–108)
CK MB BLD-MCNC: 5.5 % — HIGH (ref 0–3.5)
CK MB BLD-MCNC: 8.6 % — HIGH (ref 0–3.5)
CK MB CFR SERPL CALC: 19.2 NG/ML — HIGH (ref 0–3.8)
CK MB CFR SERPL CALC: 6.7 NG/ML — HIGH (ref 0–3.8)
CK SERPL-CCNC: 121 U/L — SIGNIFICANT CHANGE UP (ref 25–170)
CK SERPL-CCNC: 223 U/L — HIGH (ref 25–170)
CO2 BLDV-SCNC: 23 MMOL/L — SIGNIFICANT CHANGE UP (ref 22–30)
CO2 BLDV-SCNC: 26 MMOL/L — SIGNIFICANT CHANGE UP (ref 22–30)
CO2 SERPL-SCNC: 23 MMOL/L — SIGNIFICANT CHANGE UP (ref 22–31)
CO2 SERPL-SCNC: 24 MMOL/L — SIGNIFICANT CHANGE UP (ref 22–31)
COLOR SPEC: YELLOW — SIGNIFICANT CHANGE UP
CREAT SERPL-MCNC: 0.55 MG/DL — SIGNIFICANT CHANGE UP (ref 0.5–1.3)
CREAT SERPL-MCNC: 0.64 MG/DL — SIGNIFICANT CHANGE UP (ref 0.5–1.3)
DIFF PNL FLD: NEGATIVE — SIGNIFICANT CHANGE UP
EOSINOPHIL # BLD AUTO: 0.1 K/UL — SIGNIFICANT CHANGE UP (ref 0–0.5)
EOSINOPHIL NFR BLD AUTO: 0.6 % — SIGNIFICANT CHANGE UP (ref 0–6)
FIBRINOGEN PPP-MCNC: 225 MG/DL — LOW (ref 350–510)
GAS PNL BLDA: SIGNIFICANT CHANGE UP
GAS PNL BLDV: 136 MMOL/L — SIGNIFICANT CHANGE UP (ref 135–145)
GAS PNL BLDV: 137 MMOL/L — SIGNIFICANT CHANGE UP (ref 135–145)
GAS PNL BLDV: 137 MMOL/L — SIGNIFICANT CHANGE UP (ref 135–145)
GAS PNL BLDV: SIGNIFICANT CHANGE UP
GLUCOSE BLDC GLUCOMTR-MCNC: 149 MG/DL — HIGH (ref 70–99)
GLUCOSE BLDC GLUCOMTR-MCNC: 206 MG/DL — HIGH (ref 70–99)
GLUCOSE BLDC GLUCOMTR-MCNC: 221 MG/DL — HIGH (ref 70–99)
GLUCOSE BLDV-MCNC: 137 MG/DL — HIGH (ref 70–99)
GLUCOSE BLDV-MCNC: 182 MG/DL — HIGH (ref 70–99)
GLUCOSE BLDV-MCNC: 185 MG/DL — HIGH (ref 70–99)
GLUCOSE SERPL-MCNC: 124 MG/DL — HIGH (ref 70–99)
GLUCOSE SERPL-MCNC: 200 MG/DL — HIGH (ref 70–99)
GLUCOSE UR QL: NEGATIVE — SIGNIFICANT CHANGE UP
HCO3 BLDV-SCNC: 22 MMOL/L — SIGNIFICANT CHANGE UP (ref 21–29)
HCO3 BLDV-SCNC: 25 MMOL/L — SIGNIFICANT CHANGE UP (ref 21–29)
HCO3 BLDV-SCNC: 26 MMOL/L — SIGNIFICANT CHANGE UP (ref 21–29)
HCO3 BLDV-SCNC: 27 MMOL/L — SIGNIFICANT CHANGE UP (ref 21–29)
HCO3 BLDV-SCNC: 28 MMOL/L — SIGNIFICANT CHANGE UP (ref 21–29)
HCT VFR BLD CALC: 30.6 % — LOW (ref 34.5–45)
HCT VFR BLDA CALC: 20 % — CRITICAL LOW (ref 39–50)
HCT VFR BLDA CALC: 21 % — CRITICAL LOW (ref 39–50)
HCT VFR BLDA CALC: 22 % — CRITICAL LOW (ref 39–50)
HCV AB S/CO SERPL IA: 0.09 S/CO — SIGNIFICANT CHANGE UP (ref 0–0.99)
HCV AB SERPL-IMP: SIGNIFICANT CHANGE UP
HGB BLD CALC-MCNC: 6.5 G/DL — CRITICAL LOW (ref 11.5–15.5)
HGB BLD CALC-MCNC: 6.6 G/DL — CRITICAL LOW (ref 11.5–15.5)
HGB BLD CALC-MCNC: 7.2 G/DL — LOW (ref 11.5–15.5)
HGB BLD-MCNC: 11 G/DL — LOW (ref 11.5–15.5)
HOROWITZ INDEX BLDV+IHG-RTO: 0 — SIGNIFICANT CHANGE UP
HOROWITZ INDEX BLDV+IHG-RTO: 50 — SIGNIFICANT CHANGE UP
HOROWITZ INDEX BLDV+IHG-RTO: 60 — SIGNIFICANT CHANGE UP
INR BLD: 1.02 RATIO — SIGNIFICANT CHANGE UP (ref 0.88–1.16)
INR BLD: 1.03 RATIO — SIGNIFICANT CHANGE UP (ref 0.88–1.16)
INR BLD: 1.55 RATIO — HIGH (ref 0.88–1.16)
KETONES UR-MCNC: SIGNIFICANT CHANGE UP
LACTATE BLDV-MCNC: 1.1 MMOL/L — SIGNIFICANT CHANGE UP (ref 0.7–2)
LACTATE BLDV-MCNC: 1.2 MMOL/L — SIGNIFICANT CHANGE UP (ref 0.7–2)
LACTATE BLDV-MCNC: 1.7 MMOL/L — SIGNIFICANT CHANGE UP (ref 0.7–2)
LEUKOCYTE ESTERASE UR-ACNC: ABNORMAL
LYMPHOCYTES # BLD AUTO: 14 % — SIGNIFICANT CHANGE UP (ref 13–44)
LYMPHOCYTES # BLD AUTO: 2 K/UL — SIGNIFICANT CHANGE UP (ref 1–3.3)
MAGNESIUM SERPL-MCNC: 2 MG/DL — SIGNIFICANT CHANGE UP (ref 1.6–2.6)
MCHC RBC-ENTMCNC: 32.1 PG — SIGNIFICANT CHANGE UP (ref 27–34)
MCHC RBC-ENTMCNC: 35.9 GM/DL — SIGNIFICANT CHANGE UP (ref 32–36)
MCV RBC AUTO: 89.4 FL — SIGNIFICANT CHANGE UP (ref 80–100)
MONOCYTES # BLD AUTO: 0.9 K/UL — SIGNIFICANT CHANGE UP (ref 0–0.9)
MONOCYTES NFR BLD AUTO: 6.5 % — SIGNIFICANT CHANGE UP (ref 2–14)
NEUTROPHILS # BLD AUTO: 11.3 K/UL — HIGH (ref 1.8–7.4)
NEUTROPHILS NFR BLD AUTO: 78.7 % — HIGH (ref 43–77)
NITRITE UR-MCNC: NEGATIVE — SIGNIFICANT CHANGE UP
NT-PROBNP SERPL-SCNC: 75 PG/ML — SIGNIFICANT CHANGE UP (ref 0–300)
PA ADP PRP-ACNC: 267 PRU — SIGNIFICANT CHANGE UP (ref 194–417)
PCO2 BLDV: 45 MMHG — SIGNIFICANT CHANGE UP (ref 35–50)
PCO2 BLDV: 46 MMHG — SIGNIFICANT CHANGE UP (ref 35–50)
PCO2 BLDV: 48 MMHG — SIGNIFICANT CHANGE UP (ref 35–50)
PCO2 BLDV: 49 MMHG — SIGNIFICANT CHANGE UP (ref 35–50)
PCO2 BLDV: 54 MMHG — HIGH (ref 35–50)
PH BLDV: 7.23 — LOW (ref 7.35–7.45)
PH BLDV: 7.33 — LOW (ref 7.35–7.45)
PH BLDV: 7.36 — SIGNIFICANT CHANGE UP (ref 7.35–7.45)
PH BLDV: 7.38 — SIGNIFICANT CHANGE UP (ref 7.35–7.45)
PH BLDV: 7.4 — SIGNIFICANT CHANGE UP (ref 7.35–7.45)
PH UR: 5.5 — SIGNIFICANT CHANGE UP (ref 5–8)
PHOSPHATE SERPL-MCNC: 2.7 MG/DL — SIGNIFICANT CHANGE UP (ref 2.5–4.5)
PLATELET # BLD AUTO: 99 K/UL — LOW (ref 150–400)
PO2 BLDV: 37 MMHG — SIGNIFICANT CHANGE UP (ref 25–45)
PO2 BLDV: 49 MMHG — HIGH (ref 25–45)
PO2 BLDV: 50 MMHG — HIGH (ref 25–45)
PO2 BLDV: 53 MMHG — HIGH (ref 25–45)
PO2 BLDV: 62 MMHG — HIGH (ref 25–45)
POTASSIUM BLDV-SCNC: 3.4 MMOL/L — LOW (ref 3.5–5)
POTASSIUM BLDV-SCNC: 3.5 MMOL/L — SIGNIFICANT CHANGE UP (ref 3.5–5)
POTASSIUM BLDV-SCNC: 3.7 MMOL/L — SIGNIFICANT CHANGE UP (ref 3.5–5)
POTASSIUM SERPL-MCNC: 3.8 MMOL/L — SIGNIFICANT CHANGE UP (ref 3.5–5.3)
POTASSIUM SERPL-MCNC: 4.2 MMOL/L — SIGNIFICANT CHANGE UP (ref 3.5–5.3)
POTASSIUM SERPL-SCNC: 3.8 MMOL/L — SIGNIFICANT CHANGE UP (ref 3.5–5.3)
POTASSIUM SERPL-SCNC: 4.2 MMOL/L — SIGNIFICANT CHANGE UP (ref 3.5–5.3)
PROT SERPL-MCNC: 4.7 G/DL — LOW (ref 6–8.3)
PROT SERPL-MCNC: 7.1 G/DL — SIGNIFICANT CHANGE UP (ref 6–8.3)
PROT UR-MCNC: ABNORMAL
PROTHROM AB SERPL-ACNC: 11.7 SEC — SIGNIFICANT CHANGE UP (ref 10–12.9)
PROTHROM AB SERPL-ACNC: 11.8 SEC — SIGNIFICANT CHANGE UP (ref 10–12.9)
PROTHROM AB SERPL-ACNC: 17.9 SEC — HIGH (ref 10–12.9)
RBC # BLD: 3.42 M/UL — LOW (ref 3.8–5.2)
RBC # FLD: 12.3 % — SIGNIFICANT CHANGE UP (ref 10.3–14.5)
SAO2 % BLDV: 64 % — LOW (ref 67–88)
SAO2 % BLDV: 76 % — SIGNIFICANT CHANGE UP (ref 67–88)
SAO2 % BLDV: 80 % — SIGNIFICANT CHANGE UP (ref 67–88)
SAO2 % BLDV: 85 % — SIGNIFICANT CHANGE UP (ref 67–88)
SAO2 % BLDV: 91 % — HIGH (ref 67–88)
SODIUM SERPL-SCNC: 135 MMOL/L — SIGNIFICANT CHANGE UP (ref 135–145)
SODIUM SERPL-SCNC: 140 MMOL/L — SIGNIFICANT CHANGE UP (ref 135–145)
SP GR SPEC: 1.04 — HIGH (ref 1.01–1.02)
TROPONIN T, HIGH SENSITIVITY RESULT: 56 NG/L — HIGH (ref 0–51)
TROPONIN T, HIGH SENSITIVITY RESULT: 563 NG/L — HIGH (ref 0–51)
UROBILINOGEN FLD QL: NEGATIVE — SIGNIFICANT CHANGE UP
WBC # BLD: 14.4 K/UL — HIGH (ref 3.8–10.5)
WBC # FLD AUTO: 14.4 K/UL — HIGH (ref 3.8–10.5)

## 2019-07-27 PROCEDURE — 33533 CABG ARTERIAL SINGLE: CPT

## 2019-07-27 PROCEDURE — 93010 ELECTROCARDIOGRAM REPORT: CPT | Mod: 76,77

## 2019-07-27 PROCEDURE — 33517 CABG ARTERY-VEIN SINGLE: CPT

## 2019-07-27 PROCEDURE — 99291 CRITICAL CARE FIRST HOUR: CPT

## 2019-07-27 PROCEDURE — 71045 X-RAY EXAM CHEST 1 VIEW: CPT | Mod: 26

## 2019-07-27 PROCEDURE — 93010 ELECTROCARDIOGRAM REPORT: CPT

## 2019-07-27 RX ORDER — CHLORHEXIDINE GLUCONATE 213 G/1000ML
5 SOLUTION TOPICAL EVERY 4 HOURS
Refills: 0 | Status: DISCONTINUED | OUTPATIENT
Start: 2019-07-27 | End: 2019-07-27

## 2019-07-27 RX ORDER — POTASSIUM CHLORIDE 20 MEQ
10 PACKET (EA) ORAL
Refills: 0 | Status: DISCONTINUED | OUTPATIENT
Start: 2019-07-27 | End: 2019-07-28

## 2019-07-27 RX ORDER — PANTOPRAZOLE SODIUM 20 MG/1
40 TABLET, DELAYED RELEASE ORAL DAILY
Refills: 0 | Status: DISCONTINUED | OUTPATIENT
Start: 2019-07-27 | End: 2019-07-28

## 2019-07-27 RX ORDER — SODIUM CHLORIDE 9 MG/ML
250 INJECTION, SOLUTION INTRAVENOUS ONCE
Refills: 0 | Status: COMPLETED | OUTPATIENT
Start: 2019-07-27 | End: 2019-07-27

## 2019-07-27 RX ORDER — POTASSIUM PHOSPHATE, MONOBASIC POTASSIUM PHOSPHATE, DIBASIC 236; 224 MG/ML; MG/ML
15 INJECTION, SOLUTION INTRAVENOUS ONCE
Refills: 0 | Status: COMPLETED | OUTPATIENT
Start: 2019-07-27 | End: 2019-07-27

## 2019-07-27 RX ORDER — CEFUROXIME AXETIL 250 MG
1500 TABLET ORAL ONCE
Refills: 0 | Status: COMPLETED | OUTPATIENT
Start: 2019-07-27 | End: 2019-07-27

## 2019-07-27 RX ORDER — SODIUM CHLORIDE 9 MG/ML
1000 INJECTION INTRAMUSCULAR; INTRAVENOUS; SUBCUTANEOUS
Refills: 0 | Status: DISCONTINUED | OUTPATIENT
Start: 2019-07-27 | End: 2019-07-29

## 2019-07-27 RX ORDER — INSULIN HUMAN 100 [IU]/ML
3 INJECTION, SOLUTION SUBCUTANEOUS
Qty: 100 | Refills: 0 | Status: DISCONTINUED | OUTPATIENT
Start: 2019-07-27 | End: 2019-07-29

## 2019-07-27 RX ORDER — METOCLOPRAMIDE HCL 10 MG
10 TABLET ORAL EVERY 8 HOURS
Refills: 0 | Status: COMPLETED | OUTPATIENT
Start: 2019-07-27 | End: 2019-07-29

## 2019-07-27 RX ORDER — HYDROMORPHONE HYDROCHLORIDE 2 MG/ML
0.5 INJECTION INTRAMUSCULAR; INTRAVENOUS; SUBCUTANEOUS ONCE
Refills: 0 | Status: DISCONTINUED | OUTPATIENT
Start: 2019-07-27 | End: 2019-07-27

## 2019-07-27 RX ORDER — ALBUMIN HUMAN 25 %
250 VIAL (ML) INTRAVENOUS ONCE
Refills: 0 | Status: COMPLETED | OUTPATIENT
Start: 2019-07-27 | End: 2019-07-27

## 2019-07-27 RX ORDER — MAGNESIUM SULFATE 500 MG/ML
1 VIAL (ML) INJECTION ONCE
Refills: 0 | Status: COMPLETED | OUTPATIENT
Start: 2019-07-27 | End: 2019-07-27

## 2019-07-27 RX ORDER — FENTANYL CITRATE 50 UG/ML
50 INJECTION INTRAVENOUS ONCE
Refills: 0 | Status: DISCONTINUED | OUTPATIENT
Start: 2019-07-27 | End: 2019-07-27

## 2019-07-27 RX ORDER — NICARDIPINE HYDROCHLORIDE 30 MG/1
5 CAPSULE, EXTENDED RELEASE ORAL
Qty: 40 | Refills: 0 | Status: DISCONTINUED | OUTPATIENT
Start: 2019-07-27 | End: 2019-07-29

## 2019-07-27 RX ORDER — SODIUM CHLORIDE 9 MG/ML
500 INJECTION, SOLUTION INTRAVENOUS ONCE
Refills: 0 | Status: COMPLETED | OUTPATIENT
Start: 2019-07-27 | End: 2019-07-27

## 2019-07-27 RX ORDER — CHLORHEXIDINE GLUCONATE 213 G/1000ML
15 SOLUTION TOPICAL EVERY 12 HOURS
Refills: 0 | Status: DISCONTINUED | OUTPATIENT
Start: 2019-07-27 | End: 2019-07-28

## 2019-07-27 RX ORDER — POLYETHYLENE GLYCOL 3350 17 G/17G
17 POWDER, FOR SOLUTION ORAL DAILY
Refills: 0 | Status: DISCONTINUED | OUTPATIENT
Start: 2019-07-27 | End: 2019-08-02

## 2019-07-27 RX ORDER — DEXTROSE 50 % IN WATER 50 %
50 SYRINGE (ML) INTRAVENOUS
Refills: 0 | Status: DISCONTINUED | OUTPATIENT
Start: 2019-07-27 | End: 2019-08-02

## 2019-07-27 RX ORDER — MEPERIDINE HYDROCHLORIDE 50 MG/ML
25 INJECTION INTRAMUSCULAR; INTRAVENOUS; SUBCUTANEOUS ONCE
Refills: 0 | Status: DISCONTINUED | OUTPATIENT
Start: 2019-07-27 | End: 2019-07-28

## 2019-07-27 RX ORDER — DEXTROSE 50 % IN WATER 50 %
25 SYRINGE (ML) INTRAVENOUS
Refills: 0 | Status: DISCONTINUED | OUTPATIENT
Start: 2019-07-27 | End: 2019-08-02

## 2019-07-27 RX ORDER — CEFUROXIME AXETIL 250 MG
1500 TABLET ORAL EVERY 8 HOURS
Refills: 0 | Status: DISCONTINUED | OUTPATIENT
Start: 2019-07-27 | End: 2019-07-27

## 2019-07-27 RX ORDER — DOCUSATE SODIUM 100 MG
100 CAPSULE ORAL THREE TIMES A DAY
Refills: 0 | Status: DISCONTINUED | OUTPATIENT
Start: 2019-07-27 | End: 2019-08-02

## 2019-07-27 RX ORDER — POTASSIUM CHLORIDE 20 MEQ
10 PACKET (EA) ORAL
Refills: 0 | Status: DISCONTINUED | OUTPATIENT
Start: 2019-07-27 | End: 2019-07-29

## 2019-07-27 RX ORDER — POTASSIUM CHLORIDE 20 MEQ
10 PACKET (EA) ORAL
Refills: 0 | Status: COMPLETED | OUTPATIENT
Start: 2019-07-27 | End: 2019-07-27

## 2019-07-27 RX ORDER — SODIUM CHLORIDE 9 MG/ML
1000 INJECTION, SOLUTION INTRAVENOUS
Refills: 0 | Status: DISCONTINUED | OUTPATIENT
Start: 2019-07-27 | End: 2019-07-29

## 2019-07-27 RX ORDER — CEFUROXIME AXETIL 250 MG
1500 TABLET ORAL EVERY 8 HOURS
Refills: 0 | Status: COMPLETED | OUTPATIENT
Start: 2019-07-27 | End: 2019-07-28

## 2019-07-27 RX ORDER — ASPIRIN/CALCIUM CARB/MAGNESIUM 324 MG
300 TABLET ORAL ONCE
Refills: 0 | Status: DISCONTINUED | OUTPATIENT
Start: 2019-07-27 | End: 2019-07-28

## 2019-07-27 RX ORDER — ASPIRIN/CALCIUM CARB/MAGNESIUM 324 MG
81 TABLET ORAL DAILY
Refills: 0 | Status: DISCONTINUED | OUTPATIENT
Start: 2019-07-27 | End: 2019-07-28

## 2019-07-27 RX ORDER — CHLORHEXIDINE GLUCONATE 213 G/1000ML
1 SOLUTION TOPICAL
Refills: 0 | Status: DISCONTINUED | OUTPATIENT
Start: 2019-07-27 | End: 2019-08-02

## 2019-07-27 RX ADMIN — Medication 100 MILLIEQUIVALENT(S): at 15:01

## 2019-07-27 RX ADMIN — SODIUM CHLORIDE 1500 MILLILITER(S): 9 INJECTION, SOLUTION INTRAVENOUS at 17:13

## 2019-07-27 RX ADMIN — Medication 125 MILLILITER(S): at 16:45

## 2019-07-27 RX ADMIN — CHLORHEXIDINE GLUCONATE 15 MILLILITER(S): 213 SOLUTION TOPICAL at 17:17

## 2019-07-27 RX ADMIN — CHLORHEXIDINE GLUCONATE 1 APPLICATION(S): 213 SOLUTION TOPICAL at 05:32

## 2019-07-27 RX ADMIN — Medication 125 MILLILITER(S): at 17:03

## 2019-07-27 RX ADMIN — Medication 100 MILLIEQUIVALENT(S): at 18:22

## 2019-07-27 RX ADMIN — Medication 100 MILLIEQUIVALENT(S): at 15:54

## 2019-07-27 RX ADMIN — Medication 100 MILLIEQUIVALENT(S): at 16:57

## 2019-07-27 RX ADMIN — SODIUM CHLORIDE 3 MILLILITER(S): 9 INJECTION INTRAMUSCULAR; INTRAVENOUS; SUBCUTANEOUS at 05:32

## 2019-07-27 RX ADMIN — Medication 100 MILLIGRAM(S): at 14:22

## 2019-07-27 RX ADMIN — Medication 10 MILLIGRAM(S): at 22:13

## 2019-07-27 RX ADMIN — Medication 100 MILLIEQUIVALENT(S): at 14:22

## 2019-07-27 RX ADMIN — FENTANYL CITRATE 50 MICROGRAM(S): 50 INJECTION INTRAVENOUS at 17:45

## 2019-07-27 RX ADMIN — PANTOPRAZOLE SODIUM 40 MILLIGRAM(S): 20 TABLET, DELAYED RELEASE ORAL at 15:50

## 2019-07-27 RX ADMIN — SODIUM CHLORIDE 3000 MILLILITER(S): 9 INJECTION, SOLUTION INTRAVENOUS at 14:23

## 2019-07-27 RX ADMIN — Medication 100 GRAM(S): at 17:17

## 2019-07-27 RX ADMIN — Medication 100 MILLIGRAM(S): at 22:13

## 2019-07-27 RX ADMIN — FENTANYL CITRATE 50 MICROGRAM(S): 50 INJECTION INTRAVENOUS at 17:17

## 2019-07-27 RX ADMIN — POTASSIUM PHOSPHATE, MONOBASIC POTASSIUM PHOSPHATE, DIBASIC 62.5 MILLIMOLE(S): 236; 224 INJECTION, SOLUTION INTRAVENOUS at 15:54

## 2019-07-27 RX ADMIN — Medication 100 MILLIEQUIVALENT(S): at 17:17

## 2019-07-27 RX ADMIN — Medication 10 MILLIGRAM(S): at 14:22

## 2019-07-27 NOTE — PROGRESS NOTE ADULT - ATTENDING COMMENTS
Agree with above assessment and plan as outlined above.    - for CABG today    Louis Oro MD, [facility]  BEEPER [phone]

## 2019-07-27 NOTE — BRIEF OPERATIVE NOTE - NSICDXBRIEFPROCEDURE_GEN_ALL_CORE_FT
PROCEDURES:  CABG, with saphenous vein graft 27-Jul-2019 12:25:43 Patient was taken to the OR on 7/27/19 and underwent CABGX2 with LIMA to LAD; RSVG to [facility]

## 2019-07-27 NOTE — PRE-ANESTHESIA EVALUATION ADULT - NSANTHOSAYNRD_GEN_A_CORE
No. JOHANNA screening performed.  STOP BANG Legend: 0-2 = LOW Risk; 3-4 = INTERMEDIATE Risk; 5-8 = HIGH Risk

## 2019-07-27 NOTE — PROGRESS NOTE ADULT - SUBJECTIVE AND OBJECTIVE BOX
11pm-11:30pm     Remained critically ill on continuos ICU monitoring.      OBJECTIVE:  ICU Vital Signs Last 24 Hrs  T(C): 37.1 (2019 00:00), Max: 37.1 (2019 00:00)  T(F): 98.8 (2019 00:00), Max: 98.8 (2019 00:00)  HR: 54 (2019 00:00) (54 - 66)  BP: 150/69 (2019 00:00) (118/50 - 158/88)  BP(mean): 99 (2019 00:00) (99 - 115)  ABP: --  ABP(mean): --  RR: 20 (2019 00:00) (13 - 24)  SpO2: 97% (2019 00:00) (97% - 100%)        07- @ 07:01  -   @ 00:48  --------------------------------------------------------  IN: 164 mL / OUT: 0 mL / NET: 164 mL      CAPILLARY BLOOD GLUCOSE      POCT Blood Glucose.: 118 mg/dL (2019 21:17)      PHYSICAL EXAM:     Daily Weight in k (2019 00:00)  General: WN/WD NAD  Neurology: A&Ox3, nonfocal, TINOCO x 4  Eyes: PERRLA/ EOMI, Gross vision intact  ENT/Neck: Neck supple, trachea midline, No JVD, Gross hearing intact  Respiratory:  B/L fine  rales  CV: RRR, S1S2, no murmurs, rubs or gallops  Abdominal: Soft, NT, ND +BS,   Extremities: No edema, + peripheral pulses R leg IABP   Skin: No Rashes, Hematoma, Ecchymosis          HOSPITAL MEDICATIONS:  MEDICATIONS  (STANDING):  chlorhexidine 0.12% Liquid 15 milliLiter(s) Swish and Spit once  chlorhexidine 4% Liquid 1 Application(s) Topical once  heparin  Infusion 800 Unit(s)/Hr (6 mL/Hr) IV Continuous <Continuous>  metoprolol tartrate 25 milliGRAM(s) Oral two times a day  pantoprazole  Injectable 40 milliGRAM(s) IV Push daily  sodium chloride 0.9% lock flush 3 milliLiter(s) IV Push every 8 hours    MEDICATIONS  (PRN):  acetaminophen   Tablet .. 650 milliGRAM(s) Oral every 6 hours PRN Mild Pain (1 - 3)      LABS:                        13.2   7.2   )-----------( 267      ( 2019 23:32 )             39.6     07-    135  |  97  |  12  ----------------------------<  124<H>  4.2   |  24  |  0.64    Ca    9.7      2019 23:32  Phos  2.9       Mg     2.0         TPro  7.1  /  Alb  4.1  /  TBili  0.5  /  DBili  x   /  AST  27  /  ALT  26  /  AlkPhos  66  07-    PT/INR - ( 2019 23:32 )   PT: 11.8 sec;   INR: 1.03 ratio         PTT - ( 2019 23:32 )  PTT:131.8 sec        CARDIAC MARKERS ( 2019 23:32 )  x     / x     / 121 U/L / x     / 6.7 ng/mL    LIVER FUNCTIONS - ( 2019 23:32 )  Alb: 4.1 g/dL / Pro: 7.1 g/dL / ALK PHOS: 66 U/L / ALT: 26 U/L / AST: 27 U/L / GGT: x           MICROBIOLOGY:     RADIOLOGY:  X Reviewed and interpreted by me

## 2019-07-27 NOTE — PROGRESS NOTE ADULT - SUBJECTIVE AND OBJECTIVE BOX
CRITICAL CARE ATTENDING - CTICU    MEDICATIONS  (STANDING):  aspirin enteric coated 81 milliGRAM(s) Oral daily  aspirin Suppository 300 milliGRAM(s) Rectal once  cefuroxime  IVPB 1500 milliGRAM(s) IV Intermittent every 8 hours  cefuroxime  IVPB 1500 milliGRAM(s) IV Intermittent once  cefuroxime  IVPB 1500 milliGRAM(s) IV Intermittent every 8 hours  chlorhexidine 0.12% Liquid 15 milliLiter(s) Oral Mucosa every 12 hours  chlorhexidine 0.12% Liquid 5 milliLiter(s) Oral Mucosa every 4 hours  chlorhexidine 2% Cloths 1 Application(s) Topical <User Schedule>  dextrose 5%. 1000 milliLiter(s) (15 mL/Hr) IV Continuous <Continuous>  dextrose 50% Injectable 50 milliLiter(s) IV Push every 15 minutes  dextrose 50% Injectable 25 milliLiter(s) IV Push every 15 minutes  docusate sodium 100 milliGRAM(s) Oral three times a day  insulin regular Infusion 3 Unit(s)/Hr (3 mL/Hr) IV Continuous <Continuous>  meperidine     Injectable 25 milliGRAM(s) IV Push once  metoclopramide Injectable 10 milliGRAM(s) IV Push every 8 hours  niCARdipine Infusion 5 mG/Hr (25 mL/Hr) IV Continuous <Continuous>  pantoprazole  Injectable 40 milliGRAM(s) IV Push daily  polyethylene glycol 3350 17 Gram(s) Oral daily  potassium chloride  10 mEq/50 mL IVPB 10 milliEquivalent(s) IV Intermittent every 1 hour  potassium chloride  10 mEq/50 mL IVPB 10 milliEquivalent(s) IV Intermittent every 1 hour  potassium chloride  10 mEq/50 mL IVPB 10 milliEquivalent(s) IV Intermittent every 1 hour  sodium chloride 0.9%. 1000 milliLiter(s) (10 mL/Hr) IV Continuous <Continuous>                                    13.2   7.2   )-----------( 267      ( 2019 23:32 )             39.6           135  |  97  |  12  ----------------------------<  124<H>  4.2   |  24  |  0.64    Ca    9.7      2019 23:32  Phos  2.9     -  Mg     2.0         TPro  7.1  /  Alb  4.1  /  TBili  0.5  /  DBili  x   /  AST  27  /  ALT  26  /  AlkPhos  66  07-      PT/INR - ( 2019 04:07 )   PT: 11.7 sec;   INR: 1.02 ratio         PTT - ( 2019 04:07 )  PTT:84.3 sec    Mode: AC/ CMV (Assist Control/ Continuous Mandatory Ventilation)  RR (machine): 12  TV (machine): 450  FiO2: 100  PEEP: 5  ITime: 1  MAP: 9  PIP: 22      Daily     Daily Weight in k (2019 00:00)       @ 07:01  -   @ 07:00  --------------------------------------------------------  IN: 200 mL / OUT: 375 mL / NET: -175 mL        Critically Ill patient  : [ ] preoperative ,   [ x] post operative    Requires :  [ x] Arterial Line   [ x ] Central Line  [ ] PA catheter  [x ] IABP  [ ] ECMO  [ ] LVAD  [x ] Ventilator  [x ] pacemaker [ ] Impella.                      [ x] ABG's     [x ] Pulse Oxymetry Monitoring  Bedside evaluation , monitoring , treatment of hemodynamics , fluids , IVP/ IVCD meds.        Diagnosis:     Op Day CABG X 2 L    IABP   [x ] management   [ ] wean 1:1 1:2 1:3   [ ] removal and f/u vascular checks     Temporary pacemaker (TPM) interrogation and setting.     CHF- acute [ ]   chronic [x ]    systolic [ ]   diatolic [x ]          - Echo- EF -   70%          [ ] RV dysfunction          - Cxr-cardiomegally, edema          - Clinical-  [ ]inotropes   [ ]pressors   [ ]diuresis   [ x]IABP   [ ]ECMO   [ ]LVAD   [ ]Respiratory Failure    Ventilator Management:  [x ]AC-rest    [x ]CPAP-PS Wean  this PM   [ ]Trach Collar     [ ]Extubate    [ ] T-Piece  [ ]peep>5     Requires chest PT, pulmonary toilet, ambu bagging, suctioning to maintain SaO2,  patent airway and treat atelectasis.     Hemodynamic lability,instability. Requires IVCD [ ] vasopressors [ ] inotropes  [x ] vasodilator  [ ]IVSS fluid  to maintain MAP, perfusion, C.I.     Hypertension    ECG     CT Drainage     DM - IVCD Insulin                            -                     Discussed with CT surgeon, Physician's Assistant - Nurse Practitioner- Critical care medicine team.   Dicussed at  AM / PM rounds.   Chart, labs , films reviewed.    Total Time: 30 min

## 2019-07-27 NOTE — PROGRESS NOTE ADULT - ASSESSMENT
59 yr old female with H/O HTN, DM2, HLD admitted after having chest tightness, near syncope after having argument with her daughter, abnormal nuclear stress test, S/P CATH with multivessel CAD, S/P R leg IABP LM disease.    Supplemental oxygen, f/u spo2, ABGs  IV heparin gtt, target PTT 80-90  ASA & statin for CAD/ HLD  IABP with good diastolic augmentation  monitor RLE perfusion  Glycemic control  P2Y12 LEVEL  TTE results normal LV   NPO, Type screen   CABG in am    I have spent 30 minutes providing critical care management to this patient.

## 2019-07-27 NOTE — PROGRESS NOTE ADULT - SUBJECTIVE AND OBJECTIVE BOX
pt seen and examined, no complaints    S/P CATH with multivessel CAD, S/P R leg IABP LM disease.  pre op for CABG  today  IABP 1:1    acetaminophen   Tablet .. 650 milliGRAM(s) Oral every 6 hours PRN  cefuroxime  IVPB 1500 milliGRAM(s) IV Intermittent once  chlorhexidine 0.12% Liquid 15 milliLiter(s) Swish and Spit once  heparin  Infusion 800 Unit(s)/Hr IV Continuous <Continuous>  metoprolol tartrate 25 milliGRAM(s) Oral two times a day  pantoprazole  Injectable 40 milliGRAM(s) IV Push daily  sodium chloride 0.9% lock flush 3 milliLiter(s) IV Push every 8 hours                            13.2   7.2   )-----------( 267      ( 26 Jul 2019 23:32 )             39.6       Hemoglobin: 13.2 g/dL (07-26 @ 23:32)  Hemoglobin: 12.3 g/dL (07-26 @ 06:53)  Hemoglobin: 13.0 g/dL (07-25 @ 04:50)      07-26    135  |  97  |  12  ----------------------------<  124<H>  4.2   |  24  |  0.64    Ca    9.7      26 Jul 2019 23:32  Phos  2.9     07-26  Mg     2.0     07-26    TPro  7.1  /  Alb  4.1  /  TBili  0.5  /  DBili  x   /  AST  27  /  ALT  26  /  AlkPhos  66  07-26    Creatinine Trend: 0.64<--, 0.55<--, 0.49<--, 0.52<--    COAGS: PT/INR - ( 27 Jul 2019 04:07 )   PT: 11.7 sec;   INR: 1.02 ratio         PTT - ( 27 Jul 2019 04:07 )  PTT:84.3 sec    CARDIAC MARKERS ( 26 Jul 2019 23:32 )  x     / x     / 121 U/L / x     / 6.7 ng/mL        T(C): 36.8 (07-27-19 @ 04:00), Max: 37.1 (07-27-19 @ 00:00)  HR: 52 (07-27-19 @ 04:00) (49 - 66)  BP: 151/72 (07-27-19 @ 04:00) (118/50 - 161/94)  RR: 19 (07-27-19 @ 04:00) (13 - 24)  SpO2: 99% (07-27-19 @ 04:00) (96% - 100%)  Wt(kg): --    I&O's Summary    26 Jul 2019 07:01  -  27 Jul 2019 05:37  --------------------------------------------------------  IN: 188 mL / OUT: 175 mL / NET: 13 mL        Gen: Appears well in NAD  CV: N S1 S2 1/6 AVILA (+)2 Pulses B/l  Resp:  Clear to auscultation B/L, normal effort  GI: (+) BS Soft, NT, ND  Lymph:  (-)Edema, (-)obvious lymphadenopathy  Skin: Warm to touch, Normal turgor  Psych: Appropriate mood and affect      TELEMETRY: 	  NSR 60's     DATA:    < from: Transthoracic Echocardiogram (07.25.19 @ 17:31) >  CONCLUSIONS:  1. Mitral annular calcification, otherwise normal mitral  valve. No mitral regurgitation seen.  2. Increased relative wall thicknesswith normal left  ventricular mass index, consistent with concentric left  ventricular remodeling.  3. Normal left ventricular systolic function. No segmental  wall motion abnormalities.  4. Normal left ventricular diastolic function.  5. Normal right ventricular size and function.  *** Compared with echocardiogram of 6/2/2015, no  significant changes noted.  ------------------------------------------------------------------------  Confirmed on  7/26/2019 - 07:51:14 by Leo Bailey M.D.  ------------------------------------------------------------------------    < end of copied text >      < from: Nuclear Stress Test-Pharmacologic (07.26.19 @ 10:45) >  IMPRESSIONS:Abnormal Study  * Myocardial Perfusion SPECT results are abnormal.  * There is a medium sized, moderate to severe defect in  lateral wall.  * Post-stress gated wall motion analysis was performed  (LVEF > 70%;LVEDV = 37 ml.), revealing overall normal LV  function with focal diminished systolic thickening in the  lateral wall.  Based on the EKG and nuclear findings, rest imaging was  not performed, cardiac catheterization was recommended.  ------------------------------------------------------------------------  Confirmed on  7/26/2019 - 13:50:42 by Judah Garcia M.D.    < end of copied text >  ASSESSMENT/PLAN: 	    60 yo F HTN, chol, DM with pre- syncope. Cardiology following for syncope, r/o ACS.   transferred to [place] for CABG, received IABP     -- IABP   -- Cont A/C with heparin gtt   -- tolerating BB   --  GI / DVT prophylaxis.  -- keep K>4, mag >2.0    -- CTS for surgery   D/W Dr Oro

## 2019-07-28 LAB
ALBUMIN SERPL ELPH-MCNC: 4.1 G/DL — SIGNIFICANT CHANGE UP (ref 3.3–5)
ALP SERPL-CCNC: 29 U/L — LOW (ref 40–120)
ALT FLD-CCNC: 13 U/L — SIGNIFICANT CHANGE UP (ref 10–45)
ANION GAP SERPL CALC-SCNC: 12 MMOL/L — SIGNIFICANT CHANGE UP (ref 5–17)
APTT BLD: 33.3 SEC — SIGNIFICANT CHANGE UP (ref 27.5–36.3)
AST SERPL-CCNC: 32 U/L — SIGNIFICANT CHANGE UP (ref 10–40)
BASE EXCESS BLDV CALC-SCNC: 1.2 MMOL/L — SIGNIFICANT CHANGE UP (ref -2–2)
BASE EXCESS BLDV CALC-SCNC: 1.6 MMOL/L — SIGNIFICANT CHANGE UP (ref -2–2)
BASE EXCESS BLDV CALC-SCNC: 1.6 MMOL/L — SIGNIFICANT CHANGE UP (ref -2–2)
BILIRUB SERPL-MCNC: 0.6 MG/DL — SIGNIFICANT CHANGE UP (ref 0.2–1.2)
BUN SERPL-MCNC: 15 MG/DL — SIGNIFICANT CHANGE UP (ref 7–23)
CA-I SERPL-SCNC: 1.1 MMOL/L — LOW (ref 1.12–1.3)
CALCIUM SERPL-MCNC: 8.4 MG/DL — SIGNIFICANT CHANGE UP (ref 8.4–10.5)
CHLORIDE BLDV-SCNC: 108 MMOL/L — SIGNIFICANT CHANGE UP (ref 96–108)
CHLORIDE SERPL-SCNC: 106 MMOL/L — SIGNIFICANT CHANGE UP (ref 96–108)
CO2 BLDV-SCNC: 28 MMOL/L — SIGNIFICANT CHANGE UP (ref 22–30)
CO2 SERPL-SCNC: 23 MMOL/L — SIGNIFICANT CHANGE UP (ref 22–31)
CREAT SERPL-MCNC: 0.56 MG/DL — SIGNIFICANT CHANGE UP (ref 0.5–1.3)
GAS PNL BLDA: SIGNIFICANT CHANGE UP
GAS PNL BLDV: 137 MMOL/L — SIGNIFICANT CHANGE UP (ref 135–145)
GAS PNL BLDV: SIGNIFICANT CHANGE UP
GLUCOSE BLDC GLUCOMTR-MCNC: 101 MG/DL — HIGH (ref 70–99)
GLUCOSE BLDC GLUCOMTR-MCNC: 102 MG/DL — HIGH (ref 70–99)
GLUCOSE BLDC GLUCOMTR-MCNC: 111 MG/DL — HIGH (ref 70–99)
GLUCOSE BLDC GLUCOMTR-MCNC: 112 MG/DL — HIGH (ref 70–99)
GLUCOSE BLDC GLUCOMTR-MCNC: 113 MG/DL — HIGH (ref 70–99)
GLUCOSE BLDC GLUCOMTR-MCNC: 126 MG/DL — HIGH (ref 70–99)
GLUCOSE BLDC GLUCOMTR-MCNC: 127 MG/DL — HIGH (ref 70–99)
GLUCOSE BLDC GLUCOMTR-MCNC: 131 MG/DL — HIGH (ref 70–99)
GLUCOSE BLDC GLUCOMTR-MCNC: 147 MG/DL — HIGH (ref 70–99)
GLUCOSE BLDC GLUCOMTR-MCNC: 153 MG/DL — HIGH (ref 70–99)
GLUCOSE BLDC GLUCOMTR-MCNC: 165 MG/DL — HIGH (ref 70–99)
GLUCOSE BLDC GLUCOMTR-MCNC: 168 MG/DL — HIGH (ref 70–99)
GLUCOSE BLDC GLUCOMTR-MCNC: 175 MG/DL — HIGH (ref 70–99)
GLUCOSE BLDC GLUCOMTR-MCNC: 184 MG/DL — HIGH (ref 70–99)
GLUCOSE BLDC GLUCOMTR-MCNC: 189 MG/DL — HIGH (ref 70–99)
GLUCOSE BLDC GLUCOMTR-MCNC: 98 MG/DL — SIGNIFICANT CHANGE UP (ref 70–99)
GLUCOSE BLDV-MCNC: 161 MG/DL — HIGH (ref 70–99)
GLUCOSE SERPL-MCNC: 139 MG/DL — HIGH (ref 70–99)
HCO3 BLDV-SCNC: 26 MMOL/L — SIGNIFICANT CHANGE UP (ref 21–29)
HCO3 BLDV-SCNC: 26 MMOL/L — SIGNIFICANT CHANGE UP (ref 21–29)
HCO3 BLDV-SCNC: 27 MMOL/L — SIGNIFICANT CHANGE UP (ref 21–29)
HCT VFR BLD CALC: 27.8 % — LOW (ref 34.5–45)
HCT VFR BLDA CALC: 31 % — LOW (ref 39–50)
HGB BLD CALC-MCNC: 9.9 G/DL — LOW (ref 11.5–15.5)
HGB BLD-MCNC: 9.9 G/DL — LOW (ref 11.5–15.5)
HOROWITZ INDEX BLDV+IHG-RTO: 40 — SIGNIFICANT CHANGE UP
HOROWITZ INDEX BLDV+IHG-RTO: 50 — SIGNIFICANT CHANGE UP
INR BLD: 1.23 RATIO — HIGH (ref 0.88–1.16)
LACTATE BLDV-MCNC: 0.9 MMOL/L — SIGNIFICANT CHANGE UP (ref 0.7–2)
MAGNESIUM SERPL-MCNC: 2 MG/DL — SIGNIFICANT CHANGE UP (ref 1.6–2.6)
MCHC RBC-ENTMCNC: 32 PG — SIGNIFICANT CHANGE UP (ref 27–34)
MCHC RBC-ENTMCNC: 35.6 GM/DL — SIGNIFICANT CHANGE UP (ref 32–36)
MCV RBC AUTO: 90.1 FL — SIGNIFICANT CHANGE UP (ref 80–100)
OTHER CELLS CSF MANUAL: 10 ML/DL — LOW (ref 18–22)
PCO2 BLDV: 44 MMHG — SIGNIFICANT CHANGE UP (ref 35–50)
PCO2 BLDV: 46 MMHG — SIGNIFICANT CHANGE UP (ref 35–50)
PCO2 BLDV: 47 MMHG — SIGNIFICANT CHANGE UP (ref 35–50)
PH BLDV: 7.37 — SIGNIFICANT CHANGE UP (ref 7.35–7.45)
PH BLDV: 7.38 — SIGNIFICANT CHANGE UP (ref 7.35–7.45)
PH BLDV: 7.39 — SIGNIFICANT CHANGE UP (ref 7.35–7.45)
PHOSPHATE SERPL-MCNC: 2.2 MG/DL — LOW (ref 2.5–4.5)
PLATELET # BLD AUTO: 94 K/UL — LOW (ref 150–400)
PO2 BLDV: 38 MMHG — SIGNIFICANT CHANGE UP (ref 25–45)
PO2 BLDV: 38 MMHG — SIGNIFICANT CHANGE UP (ref 25–45)
PO2 BLDV: 41 MMHG — SIGNIFICANT CHANGE UP (ref 25–45)
POTASSIUM BLDV-SCNC: 3.6 MMOL/L — SIGNIFICANT CHANGE UP (ref 3.5–5.3)
POTASSIUM SERPL-MCNC: 4.3 MMOL/L — SIGNIFICANT CHANGE UP (ref 3.5–5.3)
POTASSIUM SERPL-SCNC: 4.3 MMOL/L — SIGNIFICANT CHANGE UP (ref 3.5–5.3)
PROT SERPL-MCNC: 5.5 G/DL — LOW (ref 6–8.3)
PROTHROM AB SERPL-ACNC: 14.1 SEC — HIGH (ref 10–12.9)
RBC # BLD: 3.08 M/UL — LOW (ref 3.8–5.2)
RBC # FLD: 12.4 % — SIGNIFICANT CHANGE UP (ref 10.3–14.5)
SAO2 % BLDV: 68 % — SIGNIFICANT CHANGE UP (ref 67–88)
SAO2 % BLDV: 68 % — SIGNIFICANT CHANGE UP (ref 67–88)
SAO2 % BLDV: 74 % — SIGNIFICANT CHANGE UP (ref 67–88)
SODIUM SERPL-SCNC: 141 MMOL/L — SIGNIFICANT CHANGE UP (ref 135–145)
WBC # BLD: 10.9 K/UL — HIGH (ref 3.8–10.5)
WBC # FLD AUTO: 10.9 K/UL — HIGH (ref 3.8–10.5)

## 2019-07-28 PROCEDURE — 93010 ELECTROCARDIOGRAM REPORT: CPT

## 2019-07-28 PROCEDURE — 71045 X-RAY EXAM CHEST 1 VIEW: CPT | Mod: 26

## 2019-07-28 PROCEDURE — 99291 CRITICAL CARE FIRST HOUR: CPT

## 2019-07-28 RX ORDER — MAGNESIUM SULFATE 500 MG/ML
2 VIAL (ML) INJECTION ONCE
Refills: 0 | Status: COMPLETED | OUTPATIENT
Start: 2019-07-28 | End: 2019-07-28

## 2019-07-28 RX ORDER — FUROSEMIDE 40 MG
20 TABLET ORAL ONCE
Refills: 0 | Status: COMPLETED | OUTPATIENT
Start: 2019-07-28 | End: 2019-07-28

## 2019-07-28 RX ORDER — FENTANYL CITRATE 50 UG/ML
50 INJECTION INTRAVENOUS ONCE
Refills: 0 | Status: DISCONTINUED | OUTPATIENT
Start: 2019-07-28 | End: 2019-07-28

## 2019-07-28 RX ORDER — POTASSIUM CHLORIDE 20 MEQ
10 PACKET (EA) ORAL
Refills: 0 | Status: COMPLETED | OUTPATIENT
Start: 2019-07-28 | End: 2019-07-28

## 2019-07-28 RX ORDER — FAMOTIDINE 10 MG/ML
20 INJECTION INTRAVENOUS
Refills: 0 | Status: DISCONTINUED | OUTPATIENT
Start: 2019-07-28 | End: 2019-08-02

## 2019-07-28 RX ORDER — HYDROMORPHONE HYDROCHLORIDE 2 MG/ML
0.5 INJECTION INTRAMUSCULAR; INTRAVENOUS; SUBCUTANEOUS ONCE
Refills: 0 | Status: DISCONTINUED | OUTPATIENT
Start: 2019-07-28 | End: 2019-07-28

## 2019-07-28 RX ORDER — ASPIRIN/CALCIUM CARB/MAGNESIUM 324 MG
81 TABLET ORAL DAILY
Refills: 0 | Status: DISCONTINUED | OUTPATIENT
Start: 2019-07-28 | End: 2019-08-02

## 2019-07-28 RX ORDER — FUROSEMIDE 40 MG
10 TABLET ORAL ONCE
Refills: 0 | Status: COMPLETED | OUTPATIENT
Start: 2019-07-28 | End: 2019-07-28

## 2019-07-28 RX ORDER — ESCITALOPRAM OXALATE 10 MG/1
10 TABLET, FILM COATED ORAL DAILY
Refills: 0 | Status: DISCONTINUED | OUTPATIENT
Start: 2019-07-28 | End: 2019-08-02

## 2019-07-28 RX ORDER — INSULIN LISPRO 100/ML
VIAL (ML) SUBCUTANEOUS AT BEDTIME
Refills: 0 | Status: DISCONTINUED | OUTPATIENT
Start: 2019-07-28 | End: 2019-08-02

## 2019-07-28 RX ORDER — HEPARIN SODIUM 5000 [USP'U]/ML
5000 INJECTION INTRAVENOUS; SUBCUTANEOUS EVERY 8 HOURS
Refills: 0 | Status: DISCONTINUED | OUTPATIENT
Start: 2019-07-28 | End: 2019-08-02

## 2019-07-28 RX ORDER — METOPROLOL TARTRATE 50 MG
25 TABLET ORAL
Refills: 0 | Status: DISCONTINUED | OUTPATIENT
Start: 2019-07-28 | End: 2019-08-02

## 2019-07-28 RX ORDER — ATORVASTATIN CALCIUM 80 MG/1
40 TABLET, FILM COATED ORAL AT BEDTIME
Refills: 0 | Status: DISCONTINUED | OUTPATIENT
Start: 2019-07-28 | End: 2019-08-02

## 2019-07-28 RX ORDER — INSULIN LISPRO 100/ML
VIAL (ML) SUBCUTANEOUS
Refills: 0 | Status: DISCONTINUED | OUTPATIENT
Start: 2019-07-28 | End: 2019-07-29

## 2019-07-28 RX ADMIN — Medication 62.5 MILLIMOLE(S): at 03:32

## 2019-07-28 RX ADMIN — CHLORHEXIDINE GLUCONATE 15 MILLILITER(S): 213 SOLUTION TOPICAL at 05:42

## 2019-07-28 RX ADMIN — Medication 100 MILLIGRAM(S): at 21:35

## 2019-07-28 RX ADMIN — NICARDIPINE HYDROCHLORIDE 25 MG/HR: 30 CAPSULE, EXTENDED RELEASE ORAL at 07:15

## 2019-07-28 RX ADMIN — FENTANYL CITRATE 50 MICROGRAM(S): 50 INJECTION INTRAVENOUS at 18:36

## 2019-07-28 RX ADMIN — Medication 100 MILLIGRAM(S): at 13:03

## 2019-07-28 RX ADMIN — INSULIN HUMAN 3 UNIT(S)/HR: 100 INJECTION, SOLUTION SUBCUTANEOUS at 07:15

## 2019-07-28 RX ADMIN — Medication 50 MILLIEQUIVALENT(S): at 10:15

## 2019-07-28 RX ADMIN — Medication 50 MILLIEQUIVALENT(S): at 11:04

## 2019-07-28 RX ADMIN — Medication 50 MILLIEQUIVALENT(S): at 08:21

## 2019-07-28 RX ADMIN — Medication 100 MILLIGRAM(S): at 05:42

## 2019-07-28 RX ADMIN — Medication 10 MILLIGRAM(S): at 05:42

## 2019-07-28 RX ADMIN — Medication 100 MILLIGRAM(S): at 13:02

## 2019-07-28 RX ADMIN — Medication 100 MILLIEQUIVALENT(S): at 00:37

## 2019-07-28 RX ADMIN — Medication 81 MILLIGRAM(S): at 04:29

## 2019-07-28 RX ADMIN — FAMOTIDINE 20 MILLIGRAM(S): 10 INJECTION INTRAVENOUS at 17:26

## 2019-07-28 RX ADMIN — CHLORHEXIDINE GLUCONATE 1 APPLICATION(S): 213 SOLUTION TOPICAL at 05:43

## 2019-07-28 RX ADMIN — Medication 25 MILLIGRAM(S): at 18:27

## 2019-07-28 RX ADMIN — HEPARIN SODIUM 5000 UNIT(S): 5000 INJECTION INTRAVENOUS; SUBCUTANEOUS at 13:03

## 2019-07-28 RX ADMIN — HYDROMORPHONE HYDROCHLORIDE 0.5 MILLIGRAM(S): 2 INJECTION INTRAMUSCULAR; INTRAVENOUS; SUBCUTANEOUS at 08:20

## 2019-07-28 RX ADMIN — Medication 50 GRAM(S): at 06:48

## 2019-07-28 RX ADMIN — POLYETHYLENE GLYCOL 3350 17 GRAM(S): 17 POWDER, FOR SOLUTION ORAL at 13:02

## 2019-07-28 RX ADMIN — HYDROMORPHONE HYDROCHLORIDE 0.5 MILLIGRAM(S): 2 INJECTION INTRAMUSCULAR; INTRAVENOUS; SUBCUTANEOUS at 08:35

## 2019-07-28 RX ADMIN — Medication 20 MILLIGRAM(S): at 05:41

## 2019-07-28 RX ADMIN — Medication 50 MILLIEQUIVALENT(S): at 06:47

## 2019-07-28 RX ADMIN — FENTANYL CITRATE 50 MICROGRAM(S): 50 INJECTION INTRAVENOUS at 18:21

## 2019-07-28 RX ADMIN — Medication 50 MILLIEQUIVALENT(S): at 09:30

## 2019-07-28 RX ADMIN — HEPARIN SODIUM 5000 UNIT(S): 5000 INJECTION INTRAVENOUS; SUBCUTANEOUS at 21:35

## 2019-07-28 RX ADMIN — ESCITALOPRAM OXALATE 10 MILLIGRAM(S): 10 TABLET, FILM COATED ORAL at 13:02

## 2019-07-28 RX ADMIN — Medication 10 MILLIGRAM(S): at 22:23

## 2019-07-28 RX ADMIN — Medication 10 MILLIGRAM(S): at 16:48

## 2019-07-28 RX ADMIN — ATORVASTATIN CALCIUM 40 MILLIGRAM(S): 80 TABLET, FILM COATED ORAL at 21:35

## 2019-07-28 RX ADMIN — Medication 50 MILLIEQUIVALENT(S): at 11:52

## 2019-07-28 RX ADMIN — SODIUM CHLORIDE 10 MILLILITER(S): 9 INJECTION INTRAMUSCULAR; INTRAVENOUS; SUBCUTANEOUS at 07:15

## 2019-07-28 RX ADMIN — Medication 10 MILLIGRAM(S): at 13:03

## 2019-07-28 NOTE — PROGRESS NOTE ADULT - SUBJECTIVE AND OBJECTIVE BOX
pt seen and examined, no complaints    S/P CATH with multivessel CAD, S/P R leg IABP LM disease.  s/p cabg, IABP  1:1, pt cpaping on exam     aspirin  chewable 81 milliGRAM(s) Oral daily  cefuroxime  IVPB 1500 milliGRAM(s) IV Intermittent every 8 hours  chlorhexidine 0.12% Liquid 15 milliLiter(s) Oral Mucosa every 12 hours  chlorhexidine 2% Cloths 1 Application(s) Topical <User Schedule>  dextrose 5%. 1000 milliLiter(s) IV Continuous <Continuous>  dextrose 50% Injectable 50 milliLiter(s) IV Push every 15 minutes  dextrose 50% Injectable 25 milliLiter(s) IV Push every 15 minutes  docusate sodium 100 milliGRAM(s) Oral three times a day  insulin regular Infusion 3 Unit(s)/Hr IV Continuous <Continuous>  metoclopramide Injectable 10 milliGRAM(s) IV Push every 8 hours  niCARdipine Infusion 5 mG/Hr IV Continuous <Continuous>  pantoprazole  Injectable 40 milliGRAM(s) IV Push daily  polyethylene glycol 3350 17 Gram(s) Oral daily  potassium chloride  10 mEq/50 mL IVPB 10 milliEquivalent(s) IV Intermittent every 1 hour  sodium chloride 0.9%. 1000 milliLiter(s) IV Continuous <Continuous>                            9.9    10.9  )-----------( 94       ( 28 Jul 2019 00:23 )             27.8       Hemoglobin: 9.9 g/dL (07-28 @ 00:23)  Hemoglobin: 11.0 g/dL (07-27 @ 14:10)  Hemoglobin: 13.2 g/dL (07-26 @ 23:32)  Hemoglobin: 12.3 g/dL (07-26 @ 06:53)  Hemoglobin: 13.0 g/dL (07-25 @ 04:50)      07-28    141  |  106  |  15  ----------------------------<  139<H>  4.3   |  23  |  0.56    Ca    8.4      28 Jul 2019 00:23  Phos  2.2     07-28  Mg     2.0     07-28    TPro  5.5<L>  /  Alb  4.1  /  TBili  0.6  /  DBili  x   /  AST  32  /  ALT  13  /  AlkPhos  29<L>  07-28    Creatinine Trend: 0.56<--, 0.55<--, 0.64<--, 0.55<--, 0.49<--, 0.52<--    COAGS: PT/INR - ( 28 Jul 2019 00:23 )   PT: 14.1 sec;   INR: 1.23 ratio         PTT - ( 28 Jul 2019 00:23 )  PTT:33.3 sec    CARDIAC MARKERS ( 27 Jul 2019 13:59 )  x     / x     / 223 U/L / x     / 19.2 ng/mL  CARDIAC MARKERS ( 26 Jul 2019 23:32 )  x     / x     / 121 U/L / x     / 6.7 ng/mL        T(C): 37.7 (07-28-19 @ 04:00), Max: 37.7 (07-28-19 @ 04:00)  HR: 86 (07-28-19 @ 04:35) (46 - 98)  BP: 159/91 (07-27-19 @ 07:34) (146/87 - 159/91)  RR: 20 (07-28-19 @ 04:30) (9 - 28)  SpO2: 100% (07-28-19 @ 04:35) (97% - 100%)  Wt(kg): --    I&O's Summary    26 Jul 2019 07:01  -  27 Jul 2019 07:00  --------------------------------------------------------  IN: 200 mL / OUT: 375 mL / NET: -175 mL    27 Jul 2019 07:01  -  28 Jul 2019 05:11  --------------------------------------------------------  IN: 2718.5 mL / OUT: 855 mL / NET: 1863.5 mL      Gen: Appears well in NAD  CV: N S1 S2 1/6 AVILA (+)2 Pulses B/l  Resp:  Clear to auscultation B/L, normal effort  GI: (+) BS Soft, NT, ND  Lymph:  (-)Edema, (-)obvious lymphadenopathy  Skin: Warm to touch, Normal turgor  Psych: Appropriate mood and affect      TELEMETRY: 	  NSR 60's     DATA:    < from: Transthoracic Echocardiogram (07.25.19 @ 17:31) >  CONCLUSIONS:  1. Mitral annular calcification, otherwise normal mitral  valve. No mitral regurgitation seen.  2. Increased relative wall thicknesswith normal left  ventricular mass index, consistent with concentric left  ventricular remodeling.  3. Normal left ventricular systolic function. No segmental  wall motion abnormalities.  4. Normal left ventricular diastolic function.  5. Normal right ventricular size and function.  *** Compared with echocardiogram of 6/2/2015, no  significant changes noted.  ------------------------------------------------------------------------  Confirmed on  7/26/2019 - 07:51:14 by Leo Bailey M.D.  ------------------------------------------------------------------------    < end of copied text >      < from: Nuclear Stress Test-Pharmacologic (07.26.19 @ 10:45) >  IMPRESSIONS:Abnormal Study  * Myocardial Perfusion SPECT results are abnormal.  * There is a medium sized, moderate to severe defect in  lateral wall.  * Post-stress gated wall motion analysis was performed  (LVEF > 70%;LVEDV = 37 ml.), revealing overall normal LV  function with focal diminished systolic thickening in the  lateral wall.  Based on the EKG and nuclear findings, rest imaging was  not performed, cardiac catheterization was recommended.  ------------------------------------------------------------------------  Confirmed on  7/26/2019 - 13:50:42 by Judah Garcia M.D.    < end of copied text >  ASSESSMENT/PLAN: 	    60 yo F HTN, chol, DM with pre- syncope. Cardiology following for syncope, r/o ACS.   transferred to [place] for CABG, received IABP , S/P CABG    -- wean vent to extubate ,   -- monitor chest tube drainage   -- IABP wean per cts   -- BP control with cardene   --  GI / DVT prophylaxis.  -- keep K>4, mag >2.0    D/W Dr Oro

## 2019-07-28 NOTE — AIRWAY REMOVAL NOTE  ADULT & PEDS - RESPIRATORY RHYTHM/PATTERN
rate regular/depth regular/pattern regular/no shortness of breath/prolonged expiratory phase/unlabored

## 2019-07-28 NOTE — CHART NOTE - NSCHARTNOTEFT_GEN_A_CORE
PROCEDURE NOTE  REMOVAL OF INTRA AORTIC BALLOON PUMP    The IABP (intra-aortic balloon pump) was weaned according to protocol.  Hemodynamics remained stable.  Pulses in the right lower extremity are palpable.  The patient was placed in the supine position.  The insertion site was identified and the sutures were removed.  The IABP was turned off and the balloon deflated.  The IABP was then removed.  Direct pressure was applied for 45  minutes.  A sandbag was applied and is to remain in place for three hours.    Monitoring of the right groin and both lower extremities including neuro-vascular checks and vital signs every 15 minutes  x4, then every 30 minutes x 2, then every 1 hr x 4 was ordered.      Complications: None

## 2019-07-28 NOTE — AIRWAY REMOVAL NOTE  ADULT & PEDS - ARTIFICAL AIRWAY REMOVAL COMMENTS
Written order for extubation verified. The patient was identified by full name and birth date compared to the identification band. Present during the procedure was Samy Garcia

## 2019-07-28 NOTE — PROGRESS NOTE ADULT - SUBJECTIVE AND OBJECTIVE BOX
CRITICAL CARE ATTENDING - CTICU    MEDICATIONS  (STANDING):  aspirin  chewable 81 milliGRAM(s) Oral daily  cefuroxime  IVPB 1500 milliGRAM(s) IV Intermittent every 8 hours  chlorhexidine 2% Cloths 1 Application(s) Topical <User Schedule>  dextrose 5%. 1000 milliLiter(s) (15 mL/Hr) IV Continuous <Continuous>  dextrose 50% Injectable 50 milliLiter(s) IV Push every 15 minutes  dextrose 50% Injectable 25 milliLiter(s) IV Push every 15 minutes  docusate sodium 100 milliGRAM(s) Oral three times a day  insulin regular Infusion 3 Unit(s)/Hr (3 mL/Hr) IV Continuous <Continuous>  metoclopramide Injectable 10 milliGRAM(s) IV Push every 8 hours  niCARdipine Infusion 5 mG/Hr (25 mL/Hr) IV Continuous <Continuous>  pantoprazole  Injectable 40 milliGRAM(s) IV Push daily  polyethylene glycol 3350 17 Gram(s) Oral daily  potassium chloride  10 mEq/50 mL IVPB 10 milliEquivalent(s) IV Intermittent every 1 hour  potassium chloride  10 mEq/50 mL IVPB 10 milliEquivalent(s) IV Intermittent every 1 hour  sodium chloride 0.9%. 1000 milliLiter(s) (10 mL/Hr) IV Continuous <Continuous>                                    9.9    10.9  )-----------( 94       ( 28 Jul 2019 00:23 )             27.8       07-28    141  |  106  |  15  ----------------------------<  139<H>  4.3   |  23  |  0.56    Ca    8.4      28 Jul 2019 00:23  Phos  2.2     07-28  Mg     2.0     07-28    TPro  5.5<L>  /  Alb  4.1  /  TBili  0.6  /  DBili  x   /  AST  32  /  ALT  13  /  AlkPhos  29<L>  07-28      PT/INR - ( 28 Jul 2019 00:23 )   PT: 14.1 sec;   INR: 1.23 ratio         PTT - ( 28 Jul 2019 00:23 )  PTT:33.3 sec    Mode: CPAP with PS  FiO2: 50  PEEP: 5  PS: 5  MAP: 7  PIP: 11      Daily     Daily       07-27 @ 07:01  -  07-28 @ 07:00  --------------------------------------------------------  IN: 3074 mL / OUT: 1420 mL / NET: 1654 mL    07-28 @ 07:01  - 07-28 @ 10:18  --------------------------------------------------------  IN: 232 mL / OUT: 440 mL / NET: -208 mL        Critically Ill patient  : [ ] preoperative ,   [x ] post operative    Requires :  [ x] Arterial Line   [x ] Central Line  [ ] PA catheter  [x ] IABP  [ ] ECMO  [ ] LVAD  [ ] Ventilator  [ x] pacemaker [ ] Impella.                      [x ABG's     [ x Pulse Oxymetry Monitoring  Bedside evaluation , monitoring , treatment of hemodynamics , fluids , IVP/ IVCD meds.        Diagnosis:     POD 1 CABG X 2 L / Pre Op IABP    IABP   [ x] management   [x ] wean 1:1 1:2 1:3   [x ] removal and f/u vascular checks     Thrombocytopenia     CHF- acute [ ]   chronic [ x]    systolic [ ]   diatolic [x ]          - Echo- EF -  70%           [ ] RV dysfunction          - Cxr-cardiomegally, edema          - Clinical-  [ ]inotropes   [ ]pressors   [ ]diuresis   [ x]IABP   [ ]ECMO   [ ]LVAD   [ ]Respiratory Failure    Hemodynamic lability,instability. Requires IVCD [ ] vasopressors [ ] inotropes  [ x] vasodilator  [ ]IVSS fluid  to maintain MAP, perfusion, C.I.     ECG    CT drainage    DM - IIVCD insulin    Hypertension    Temporary pacemaker (TPM) interrogation and setting.                                     -                   Discussed with CT surgeon, Physician's Assistant - Nurse Practitioner- Critical care medicine team.   Dicussed at  AM / PM rounds.   Chart, labs , films reviewed.    Total Time: 30 min

## 2019-07-28 NOTE — PROGRESS NOTE ADULT - ATTENDING COMMENTS
Patient seen and examined, agree with above assessment and plan as transcribed above.    - Wean IABP anticipate can likely d/c today    Louis Oro MD, [facility]  BEEPER [phone]

## 2019-07-29 DIAGNOSIS — E87.70 FLUID OVERLOAD, UNSPECIFIED: ICD-10-CM

## 2019-07-29 DIAGNOSIS — Z96.89 PRESENCE OF OTHER SPECIFIED FUNCTIONAL IMPLANTS: ICD-10-CM

## 2019-07-29 DIAGNOSIS — Z95.1 PRESENCE OF AORTOCORONARY BYPASS GRAFT: ICD-10-CM

## 2019-07-29 DIAGNOSIS — Z29.9 ENCOUNTER FOR PROPHYLACTIC MEASURES, UNSPECIFIED: ICD-10-CM

## 2019-07-29 LAB
ALBUMIN SERPL ELPH-MCNC: 3.6 G/DL — SIGNIFICANT CHANGE UP (ref 3.3–5)
ALP SERPL-CCNC: 32 U/L — LOW (ref 40–120)
ALT FLD-CCNC: 15 U/L — SIGNIFICANT CHANGE UP (ref 10–45)
ANION GAP SERPL CALC-SCNC: 9 MMOL/L — SIGNIFICANT CHANGE UP (ref 5–17)
AST SERPL-CCNC: 25 U/L — SIGNIFICANT CHANGE UP (ref 10–40)
BILIRUB SERPL-MCNC: 0.5 MG/DL — SIGNIFICANT CHANGE UP (ref 0.2–1.2)
BUN SERPL-MCNC: 13 MG/DL — SIGNIFICANT CHANGE UP (ref 7–23)
CALCIUM SERPL-MCNC: 8.4 MG/DL — SIGNIFICANT CHANGE UP (ref 8.4–10.5)
CHLORIDE SERPL-SCNC: 103 MMOL/L — SIGNIFICANT CHANGE UP (ref 96–108)
CO2 SERPL-SCNC: 25 MMOL/L — SIGNIFICANT CHANGE UP (ref 22–31)
CREAT SERPL-MCNC: 0.42 MG/DL — LOW (ref 0.5–1.3)
GAS PNL BLDA: SIGNIFICANT CHANGE UP
GLUCOSE BLDC GLUCOMTR-MCNC: 118 MG/DL — HIGH (ref 70–99)
GLUCOSE BLDC GLUCOMTR-MCNC: 118 MG/DL — HIGH (ref 70–99)
GLUCOSE BLDC GLUCOMTR-MCNC: 130 MG/DL — HIGH (ref 70–99)
GLUCOSE BLDC GLUCOMTR-MCNC: 131 MG/DL — HIGH (ref 70–99)
GLUCOSE BLDC GLUCOMTR-MCNC: 137 MG/DL — HIGH (ref 70–99)
GLUCOSE BLDC GLUCOMTR-MCNC: 149 MG/DL — HIGH (ref 70–99)
GLUCOSE BLDC GLUCOMTR-MCNC: 150 MG/DL — HIGH (ref 70–99)
GLUCOSE BLDC GLUCOMTR-MCNC: 159 MG/DL — HIGH (ref 70–99)
GLUCOSE BLDC GLUCOMTR-MCNC: 163 MG/DL — HIGH (ref 70–99)
GLUCOSE BLDC GLUCOMTR-MCNC: 217 MG/DL — HIGH (ref 70–99)
GLUCOSE BLDC GLUCOMTR-MCNC: 218 MG/DL — HIGH (ref 70–99)
GLUCOSE BLDC GLUCOMTR-MCNC: 78 MG/DL — SIGNIFICANT CHANGE UP (ref 70–99)
GLUCOSE SERPL-MCNC: 104 MG/DL — HIGH (ref 70–99)
HCT VFR BLD CALC: 27.9 % — LOW (ref 34.5–45)
HGB BLD-MCNC: 9 G/DL — LOW (ref 11.5–15.5)
MAGNESIUM SERPL-MCNC: 2.4 MG/DL — SIGNIFICANT CHANGE UP (ref 1.6–2.6)
MCHC RBC-ENTMCNC: 29.7 PG — SIGNIFICANT CHANGE UP (ref 27–34)
MCHC RBC-ENTMCNC: 32.4 GM/DL — SIGNIFICANT CHANGE UP (ref 32–36)
MCV RBC AUTO: 91.6 FL — SIGNIFICANT CHANGE UP (ref 80–100)
PHOSPHATE SERPL-MCNC: 1.6 MG/DL — LOW (ref 2.5–4.5)
PLATELET # BLD AUTO: 102 K/UL — LOW (ref 150–400)
POTASSIUM SERPL-MCNC: 4.2 MMOL/L — SIGNIFICANT CHANGE UP (ref 3.5–5.3)
POTASSIUM SERPL-SCNC: 4.2 MMOL/L — SIGNIFICANT CHANGE UP (ref 3.5–5.3)
PROT SERPL-MCNC: 5.6 G/DL — LOW (ref 6–8.3)
RBC # BLD: 3.05 M/UL — LOW (ref 3.8–5.2)
RBC # FLD: 12.9 % — SIGNIFICANT CHANGE UP (ref 10.3–14.5)
SODIUM SERPL-SCNC: 137 MMOL/L — SIGNIFICANT CHANGE UP (ref 135–145)
WBC # BLD: 15.6 K/UL — HIGH (ref 3.8–10.5)
WBC # FLD AUTO: 15.6 K/UL — HIGH (ref 3.8–10.5)

## 2019-07-29 PROCEDURE — 99233 SBSQ HOSP IP/OBS HIGH 50: CPT

## 2019-07-29 PROCEDURE — 71045 X-RAY EXAM CHEST 1 VIEW: CPT | Mod: 26

## 2019-07-29 RX ORDER — SODIUM CHLORIDE 9 MG/ML
3 INJECTION INTRAMUSCULAR; INTRAVENOUS; SUBCUTANEOUS EVERY 8 HOURS
Refills: 0 | Status: DISCONTINUED | OUTPATIENT
Start: 2019-07-29 | End: 2019-08-02

## 2019-07-29 RX ORDER — INSULIN LISPRO 100/ML
VIAL (ML) SUBCUTANEOUS
Refills: 0 | Status: DISCONTINUED | OUTPATIENT
Start: 2019-07-29 | End: 2019-08-02

## 2019-07-29 RX ORDER — MAGNESIUM SULFATE 500 MG/ML
2 VIAL (ML) INJECTION ONCE
Refills: 0 | Status: COMPLETED | OUTPATIENT
Start: 2019-07-29 | End: 2019-07-29

## 2019-07-29 RX ORDER — OXYCODONE AND ACETAMINOPHEN 5; 325 MG/1; MG/1
2 TABLET ORAL EVERY 6 HOURS
Refills: 0 | Status: DISCONTINUED | OUTPATIENT
Start: 2019-07-29 | End: 2019-08-02

## 2019-07-29 RX ORDER — OXYCODONE AND ACETAMINOPHEN 5; 325 MG/1; MG/1
1 TABLET ORAL EVERY 6 HOURS
Refills: 0 | Status: DISCONTINUED | OUTPATIENT
Start: 2019-07-29 | End: 2019-08-02

## 2019-07-29 RX ORDER — FUROSEMIDE 40 MG
40 TABLET ORAL DAILY
Refills: 0 | Status: DISCONTINUED | OUTPATIENT
Start: 2019-07-30 | End: 2019-08-02

## 2019-07-29 RX ORDER — POTASSIUM CHLORIDE 20 MEQ
10 PACKET (EA) ORAL ONCE
Refills: 0 | Status: COMPLETED | OUTPATIENT
Start: 2019-07-29 | End: 2019-07-29

## 2019-07-29 RX ORDER — SPIRONOLACTONE 25 MG/1
25 TABLET, FILM COATED ORAL DAILY
Refills: 0 | Status: DISCONTINUED | OUTPATIENT
Start: 2019-07-30 | End: 2019-08-02

## 2019-07-29 RX ADMIN — HEPARIN SODIUM 5000 UNIT(S): 5000 INJECTION INTRAVENOUS; SUBCUTANEOUS at 22:59

## 2019-07-29 RX ADMIN — SODIUM CHLORIDE 3 MILLILITER(S): 9 INJECTION INTRAMUSCULAR; INTRAVENOUS; SUBCUTANEOUS at 22:00

## 2019-07-29 RX ADMIN — CHLORHEXIDINE GLUCONATE 1 APPLICATION(S): 213 SOLUTION TOPICAL at 05:01

## 2019-07-29 RX ADMIN — FAMOTIDINE 20 MILLIGRAM(S): 10 INJECTION INTRAVENOUS at 19:05

## 2019-07-29 RX ADMIN — Medication 10 MILLIGRAM(S): at 05:02

## 2019-07-29 RX ADMIN — ESCITALOPRAM OXALATE 10 MILLIGRAM(S): 10 TABLET, FILM COATED ORAL at 14:37

## 2019-07-29 RX ADMIN — POLYETHYLENE GLYCOL 3350 17 GRAM(S): 17 POWDER, FOR SOLUTION ORAL at 13:11

## 2019-07-29 RX ADMIN — Medication 100 MILLIGRAM(S): at 22:59

## 2019-07-29 RX ADMIN — Medication 25 MILLIGRAM(S): at 05:01

## 2019-07-29 RX ADMIN — OXYCODONE AND ACETAMINOPHEN 1 TABLET(S): 5; 325 TABLET ORAL at 08:59

## 2019-07-29 RX ADMIN — SODIUM CHLORIDE 10 MILLILITER(S): 9 INJECTION INTRAMUSCULAR; INTRAVENOUS; SUBCUTANEOUS at 01:23

## 2019-07-29 RX ADMIN — Medication 50 MILLIEQUIVALENT(S): at 00:45

## 2019-07-29 RX ADMIN — OXYCODONE AND ACETAMINOPHEN 1 TABLET(S): 5; 325 TABLET ORAL at 15:37

## 2019-07-29 RX ADMIN — OXYCODONE AND ACETAMINOPHEN 1 TABLET(S): 5; 325 TABLET ORAL at 16:10

## 2019-07-29 RX ADMIN — ATORVASTATIN CALCIUM 40 MILLIGRAM(S): 80 TABLET, FILM COATED ORAL at 22:59

## 2019-07-29 RX ADMIN — SODIUM CHLORIDE 3 MILLILITER(S): 9 INJECTION INTRAMUSCULAR; INTRAVENOUS; SUBCUTANEOUS at 13:13

## 2019-07-29 RX ADMIN — INSULIN HUMAN 3 UNIT(S)/HR: 100 INJECTION, SOLUTION SUBCUTANEOUS at 01:07

## 2019-07-29 RX ADMIN — Medication 62.5 MILLIMOLE(S): at 04:33

## 2019-07-29 RX ADMIN — FAMOTIDINE 20 MILLIGRAM(S): 10 INJECTION INTRAVENOUS at 05:01

## 2019-07-29 RX ADMIN — Medication 25 MILLIGRAM(S): at 19:05

## 2019-07-29 RX ADMIN — Medication 100 MILLIGRAM(S): at 05:01

## 2019-07-29 RX ADMIN — HEPARIN SODIUM 5000 UNIT(S): 5000 INJECTION INTRAVENOUS; SUBCUTANEOUS at 13:11

## 2019-07-29 RX ADMIN — HEPARIN SODIUM 5000 UNIT(S): 5000 INJECTION INTRAVENOUS; SUBCUTANEOUS at 05:02

## 2019-07-29 RX ADMIN — Medication 10: at 12:28

## 2019-07-29 RX ADMIN — OXYCODONE AND ACETAMINOPHEN 1 TABLET(S): 5; 325 TABLET ORAL at 09:29

## 2019-07-29 RX ADMIN — Medication 100 MILLIGRAM(S): at 13:11

## 2019-07-29 RX ADMIN — Medication 50 GRAM(S): at 01:23

## 2019-07-29 RX ADMIN — Medication 81 MILLIGRAM(S): at 13:11

## 2019-07-29 NOTE — PHYSICAL THERAPY INITIAL EVALUATION ADULT - GENERAL OBSERVATIONS, REHAB EVAL
Received seated on chair, +midsternal surgical incision with dressing, +R IJ, +ext pacer, +chest tube, +O2 via NC, +nicolas; reported 5/10 incisional pain but was given pain meds & agreeable to PT

## 2019-07-29 NOTE — PHYSICAL THERAPY INITIAL EVALUATION ADULT - ADDITIONAL COMMENTS
Pt lives in a 1st floor apartment with 6 steps to negotiate. Per pt's son, pt is the primary caregiver for her spouse who has h/o of CVA & requires assist in ADLs.

## 2019-07-29 NOTE — PROGRESS NOTE ADULT - SUBJECTIVE AND OBJECTIVE BOX
S: Slight incisional chest discomfort improving. No SOB. Review of systems otherwise (-)    MEDICATIONS  (STANDING):  aspirin  chewable 81 milliGRAM(s) Oral daily  atorvastatin 40 milliGRAM(s) Oral at bedtime  chlorhexidine 2% Cloths 1 Application(s) Topical <User Schedule>  dextrose 50% Injectable 50 milliLiter(s) IV Push every 15 minutes  dextrose 50% Injectable 25 milliLiter(s) IV Push every 15 minutes  docusate sodium 100 milliGRAM(s) Oral three times a day  escitalopram 10 milliGRAM(s) Oral daily  famotidine    Tablet 20 milliGRAM(s) Oral two times a day  heparin  Injectable 5000 Unit(s) SubCutaneous every 8 hours  insulin lispro (HumaLOG) corrective regimen sliding scale   SubCutaneous at bedtime  insulin lispro (HumaLOG) corrective regimen sliding scale   SubCutaneous three times a day before meals  metoprolol tartrate 25 milliGRAM(s) Oral two times a day  polyethylene glycol 3350 17 Gram(s) Oral daily  sodium chloride 0.9% lock flush 3 milliLiter(s) IV Push every 8 hours    MEDICATIONS  (PRN):  oxyCODONE    5 mG/acetaminophen 325 mG 1 Tablet(s) Oral every 6 hours PRN Moderate Pain (4 - 6)  oxyCODONE    5 mG/acetaminophen 325 mG 2 Tablet(s) Oral every 6 hours PRN Severe Pain (7 - 10)      LABS:                            9.0    15.6  )-----------( 102      ( 29 Jul 2019 00:30 )             27.9     Hemoglobin: 9.0 g/dL (07-29 @ 00:30)  Hemoglobin: 9.9 g/dL (07-28 @ 00:23)  Hemoglobin: 11.0 g/dL (07-27 @ 14:10)  Hemoglobin: 13.2 g/dL (07-26 @ 23:32)  Hemoglobin: 12.3 g/dL (07-26 @ 06:53)    07-29    137  |  103  |  13  ----------------------------<  104<H>  4.2   |  25  |  0.42<L>    Ca    8.4      29 Jul 2019 00:30  Phos  1.6     07-29  Mg     2.4     07-29    TPro  5.6<L>  /  Alb  3.6  /  TBili  0.5  /  DBili  x   /  AST  25  /  ALT  15  /  AlkPhos  32<L>  07-29    Creatinine Trend: 0.42<--, 0.56<--, 0.55<--, 0.64<--, 0.55<--, 0.49<--     CARDIAC MARKERS ( 27 Jul 2019 13:59 )  x     / x     / 223 U/L / x     / 19.2 ng/mL  CARDIAC MARKERS ( 26 Jul 2019 23:32 )  x     / x     / 121 U/L / x     / 6.7 ng/mL        PHYSICAL EXAM  Vital Signs Last 24 Hrs  T(C): 36.9 (29 Jul 2019 13:40), Max: 37.7 (29 Jul 2019 04:00)  T(F): 98.4 (29 Jul 2019 13:40), Max: 99.9 (29 Jul 2019 04:00)  HR: 89 (29 Jul 2019 13:40) (66 - 89)  BP: 122/73 (29 Jul 2019 13:40) (99/58 - 122/73)  BP(mean): 72 (29 Jul 2019 10:30) (72 - 72)  RR: 18 (29 Jul 2019 13:40) (18 - 33)  SpO2: 100% (29 Jul 2019 13:40) (94% - 100%)      Gen: Appears well in NAD  CV: N S1 S2 1/6 AVILA (+)2 Pulses B/l  Resp:  Clear to auscultation B/L, normal effort  GI: (+) BS Soft, NT, ND  Lymph:  (-)Edema, (-)obvious lymphadenopathy  Skin: Warm to touch, Normal turgor  Psych: Appropriate mood and affect      TELEMETRY: NSR 60-90    DATA:    < from: Transthoracic Echocardiogram (07.25.19 @ 17:31) >  CONCLUSIONS:  1. Mitral annular calcification, otherwise normal mitral  valve. No mitral regurgitation seen.  2. Increased relative wall thicknesswith normal left  ventricular mass index, consistent with concentric left  ventricular remodeling.  3. Normal left ventricular systolic function. No segmental  wall motion abnormalities.  4. Normal left ventricular diastolic function.  5. Normal right ventricular size and function.  *** Compared with echocardiogram of 6/2/2015, no  significant changes noted.  ------------------------------------------------------------------------  Confirmed on  7/26/2019 - 07:51:14 by Leo Bailey M.D.  ------------------------------------------------------------------------    < end of copied text >      < from: Nuclear Stress Test-Pharmacologic (07.26.19 @ 10:45) >  IMPRESSIONS:Abnormal Study  * Myocardial Perfusion SPECT results are abnormal.  * There is a medium sized, moderate to severe defect in  lateral wall.  * Post-stress gated wall motion analysis was performed  (LVEF > 70%;LVEDV = 37 ml.), revealing overall normal LV  function with focal diminished systolic thickening in the  lateral wall.  Based on the EKG and nuclear findings, rest imaging was  not performed, cardiac catheterization was recommended.  ------------------------------------------------------------------------  Confirmed on  7/26/2019 - 13:50:42 by Judah Garcia M.D.    < end of copied text >  ASSESSMENT/PLAN: 	    60 yo F HTN, chol, DM with pre- syncope. Cardiology following for syncope, r/o ACS.   transferred to [place] for CABG, received IABP , S/P CABG.    - Transferred to [facility]  - Progressing well  - Continue medical management of CAD - ASA/Statin/BB  - Pain control  - Supportive Care per CTS    [facility]  Pager: [phone]

## 2019-07-29 NOTE — PHYSICAL THERAPY INITIAL EVALUATION ADULT - PERTINENT HX OF CURRENT PROBLEM, REHAB EVAL
59F PMH of HTN, HLD, DM2 who presented to [facility] ED 7/25 with near syncope after arguing with her daughter. Pt c/o acute onset nausea, diaphoresis, palpitations, dizziness, & mild chest pressure. Pt underwent cath at [facility] which showed 95% left main and 95% RCA lesions. A right femoral IABP was placed, heparin drip started. Transferred to [facility] for CABG.

## 2019-07-29 NOTE — PROGRESS NOTE ADULT - ASSESSMENT
This patient is a 59 year old Khmer and English speaking female with PMH of HTN, HLD, DM2 who presented to [facility] ED 7/25 with near syncope after arguing with her daughter. Patient also had acute onset nausea, diaphoresis, palpitations, dizziness, and mild chest pressure. NO chest pain, SOB, AGARWAL, PND, orthopnea, syncope, increased lower extremity edema, fever, chills, malaise, myalgias, anorexia, weight changes ( loss or gain), night sweats, generalized fatigue, BRBPR, melena, urinary symptoms, cough, and wheezing.   The patient had cath at [facility] which showed 95% left main and 95% RCA lesions. A right femoral IABP was placed, heparin drip started and patient transferred to [facility] for CABG eval.  s/p 7/27/19 C2L   POD #2  postop extubated/ insulin gttp weaned off  7/29 tx floor This patient is a 59 year old Vietnamese and English speaking female with PMH of HTN, HLD, DM2 who presented to [facility] ED 7/25 with near syncope after arguing with her daughter. Patient also had acute onset nausea, diaphoresis, palpitations, dizziness, and mild chest pressure. NO chest pain, SOB, AGARWAL, PND, orthopnea, syncope, increased lower extremity edema, fever, chills, malaise, myalgias, anorexia, weight changes ( loss or gain), night sweats, generalized fatigue, BRBPR, melena, urinary symptoms, cough, and wheezing.   The patient had cath at [facility] which showed 95% left main and 95% RCA lesions. A right femoral IABP was placed, heparin drip started and patient transferred to [facility] for CABG eval.  s/p 7/27/19 C2L   POD #2  postop extubated/ insulin gttp weaned off  7/29 tx floor ; VSS/ RSR 80's; maintain left ct and rle ghassan til am   Discharge planning- pt eval

## 2019-07-29 NOTE — PHYSICAL THERAPY INITIAL EVALUATION ADULT - CRITERIA FOR SKILLED THERAPEUTIC INTERVENTIONS
impairments found/anticipated equipment needs at discharge/anticipated discharge recommendation/risk reduction/prevention

## 2019-07-29 NOTE — PHYSICAL THERAPY INITIAL EVALUATION ADULT - GAIT DEVIATIONS NOTED, PT EVAL
increased time in double stance/decreased step length/decreased ajyjay/decreased velocity of limb motion

## 2019-07-29 NOTE — PROGRESS NOTE ADULT - SUBJECTIVE AND OBJECTIVE BOX
CRITICAL CARE ATTENDING - CTICU    MEDICATIONS  (STANDING):  aspirin  chewable 81 milliGRAM(s) Oral daily  atorvastatin 40 milliGRAM(s) Oral at bedtime  chlorhexidine 2% Cloths 1 Application(s) Topical <User Schedule>  dextrose 50% Injectable 50 milliLiter(s) IV Push every 15 minutes  dextrose 50% Injectable 25 milliLiter(s) IV Push every 15 minutes  docusate sodium 100 milliGRAM(s) Oral three times a day  escitalopram 10 milliGRAM(s) Oral daily  famotidine    Tablet 20 milliGRAM(s) Oral two times a day  heparin  Injectable 5000 Unit(s) SubCutaneous every 8 hours  insulin lispro (HumaLOG) corrective regimen sliding scale   SubCutaneous at bedtime  insulin lispro (HumaLOG) corrective regimen sliding scale   SubCutaneous three times a day before meals  metoprolol tartrate 25 milliGRAM(s) Oral two times a day  polyethylene glycol 3350 17 Gram(s) Oral daily                                    9.0    15.6  )-----------( 102      ( 2019 00:30 )             27.9           137  |  103  |  13  ----------------------------<  104<H>  4.2   |  25  |  0.42<L>    Ca    8.4      2019 00:30  Phos  1.6       Mg     2.4         TPro  5.6<L>  /  Alb  3.6  /  TBili  0.5  /  DBili  x   /  AST  25  /  ALT  15  /  AlkPhos  32<L>        PT/INR - ( 2019 00:23 )   PT: 14.1 sec;   INR: 1.23 ratio         PTT - ( 2019 00:23 )  PTT:33.3 sec        Daily     Daily Weight in k.5 (2019 00:00)       @ 07: @ 07:00  --------------------------------------------------------  IN: 1775.5 mL / OUT: 2850 mL / NET: -1074.5 mL     @ 07: @ 08:11  --------------------------------------------------------  IN: 75.5 mL / OUT: 100 mL / NET: -24.5 mL        Critically Ill patient  : [ ] preoperative ,   [ x] post operative    Requires :  [ x] Arterial Line   [ ] Central Line  [ ] PA catheter  [ ] IABP  [ ] ECMO  [ ] LVAD  [ ] Ventilator  [ x] pacemaker [ ] Impella.                      [ x] ABG's     [ x] Pulse Oxymetry Monitoring  Bedside evaluation , monitoring , treatment of hemodynamics , fluids , IVP/ IVCD meds.        Diagnosis:     POD 2 - CABG X 2 L / IABP    IABP removed     Hypertension    Hemodynamic lability,instability. Requires IVCD [ ] vasopressors [ ] inotropes  [x ] vasodilator  [ ]IVSS fluid  to maintain MAP, perfusion, C.I.     DM    Thrombocytopenia     ECG    CT Drainage    Beta Blockers    Requires bedside physical therapy, mobilization and total assisted care.     Temporary pacemaker (TPM) interrogation and setting.                         Discussed with CT surgeon, Physician's Assistant - Nurse Practitioner- Critical care medicine team.   Dicussed at  AM / PM rounds.   Chart, labs , films reviewed.    Total Time: 30 min

## 2019-07-29 NOTE — PROGRESS NOTE ADULT - SUBJECTIVE AND OBJECTIVE BOX
VITAL SIGNS    Telemetry:    Vital Signs Last 24 Hrs  T(C): 36.9 (19 @ 13:40), Max: 37.7 (19 @ 04:00)  T(F): 98.4 (19 @ 13:40), Max: 99.9 (19 @ 04:00)  HR: 89 (19 @ 13:40) (66 - 89)  BP: 122/73 (19 @ 13:40) (99/58 - 122/73)  RR: 18 (19 @ 13:40) (18 - 33)  SpO2: 100% (19 @ 13:40) (97% - 100%)             @ 07:01  -   @ 07:00  --------------------------------------------------------  IN: 1775.5 mL / OUT: 2850 mL / NET: -1074.5 mL     @ 07:01  -   @ 17:07  --------------------------------------------------------  IN: 435.5 mL / OUT: 390 mL / NET: 45.5 mL       Daily     Daily Weight in k.5 (2019 00:00)  Admit Wt: Drug Dosing Weight  Height (cm): 152.4 (2019 22:57)  Weight (kg): 62.1 (2019 22:57)  BMI (kg/m2): 26.7 (2019 22:57)  BSA (m2): 1.59 (2019 22:57)    Bilirubin Total, Serum: 0.5 mg/dL ( @ 00:30)    CAPILLARY BLOOD GLUCOSE  159 (2019 06:00)  149 (2019 05:00)  167 (2019 04:00)  137 (2019 03:00)  131 (2019 02:00)  118 (2019 01:30)  78 (2019 01:00)  102 (2019 00:00)  153 (2019 23:00)  175 (2019 22:00)  167 (2019 21:00)  147 (2019 20:00)      POCT Blood Glucose.: 217 mg/dL (2019 12:07)  POCT Blood Glucose.: 130 mg/dL (2019 07:55)  POCT Blood Glucose.: 150 mg/dL (2019 06:41)  POCT Blood Glucose.: 159 mg/dL (2019 05:49)  POCT Blood Glucose.: 149 mg/dL (2019 04:44)  POCT Blood Glucose.: 163 mg/dL (2019 03:56)  POCT Blood Glucose.: 137 mg/dL (2019 03:15)  POCT Blood Glucose.: 131 mg/dL (2019 01:53)  POCT Blood Glucose.: 118 mg/dL (2019 01:29)  POCT Blood Glucose.: 78 mg/dL (2019 00:55)  POCT Blood Glucose.: 153 mg/dL (2019 22:48)  POCT Blood Glucose.: 175 mg/dL (2019 21:46)  POCT Blood Glucose.: 165 mg/dL (2019 20:55)  POCT Blood Glucose.: 147 mg/dL (2019 19:59)  POCT Blood Glucose.: 184 mg/dL (2019 18:50)  POCT Blood Glucose.: 189 mg/dL (2019 17:54)  POCT Blood Glucose.: 98 mg/dL (2019 17:11)          aspirin  chewable 81 milliGRAM(s) Oral daily  atorvastatin 40 milliGRAM(s) Oral at bedtime  chlorhexidine 2% Cloths 1 Application(s) Topical <User Schedule>  dextrose 50% Injectable 50 milliLiter(s) IV Push every 15 minutes  dextrose 50% Injectable 25 milliLiter(s) IV Push every 15 minutes  docusate sodium 100 milliGRAM(s) Oral three times a day  escitalopram 10 milliGRAM(s) Oral daily  famotidine    Tablet 20 milliGRAM(s) Oral two times a day  heparin  Injectable 5000 Unit(s) SubCutaneous every 8 hours  insulin lispro (HumaLOG) corrective regimen sliding scale   SubCutaneous at bedtime  insulin lispro (HumaLOG) corrective regimen sliding scale   SubCutaneous three times a day before meals  metoprolol tartrate 25 milliGRAM(s) Oral two times a day  oxyCODONE    5 mG/acetaminophen 325 mG 1 Tablet(s) Oral every 6 hours PRN  oxyCODONE    5 mG/acetaminophen 325 mG 2 Tablet(s) Oral every 6 hours PRN  polyethylene glycol 3350 17 Gram(s) Oral daily  sodium chloride 0.9% lock flush 3 milliLiter(s) IV Push every 8 hours      PHYSICAL EXAM    Subjective: "Hi.   Neurology: alert and oriented x 3, nonfocal, no gross deficits  CV : tele:  RSR  Sternal Wound :  CDI with dressing , Stable  Lungs: clear. RR easy, unlabored   Abdomen: soft, nontender, nondistended, positive bowel sounds, bowel movement   Neg N/V/D   :  pt voiding without difficulty   Extremities:   TINOCO; edema, neg calf tenderness.   PPP bilaterally      PW:  Chest tubes: VITAL SIGNS    Telemetry:  rsr 80'S  Vital Signs Last 24 Hrs  T(C): 36.9 (19 @ 13:40), Max: 37.7 (19 @ 04:00)  T(F): 98.4 (19 @ 13:40), Max: 99.9 (19 @ 04:00)  HR: 89 (19 @ 13:40) (66 - 89)  BP: 122/73 (19 @ 13:40) (99/58 - 122/73)  RR: 18 (19 @ 13:40) (18 - 33)  SpO2: 100% (19 @ 13:40) (97% - 100%)             @ 07:  -   @ 07:00  --------------------------------------------------------  IN: 1775.5 mL / OUT: 2850 mL / NET: -1074.5 mL     @ 07:  -   @ 17:07  --------------------------------------------------------  IN: 435.5 mL / OUT: 390 mL / NET: 45.5 mL       Daily     Daily Weight in k.5 (2019 00:00)  Admit Wt: Drug Dosing Weight  Height (cm): 152.4 (2019 22:57)  Weight (kg): 62.1 (2019 22:57)  BMI (kg/m2): 26.7 (2019 22:57)  BSA (m2): 1.59 (2019 22:57)    Bilirubin Total, Serum: 0.5 mg/dL ( @ 00:30)    CAPILLARY BLOOD GLUCOSE  159 (2019 06:00)  149 (2019 05:00)  167 (2019 04:00)  137 (2019 03:00)  131 (2019 02:00)  118 (2019 01:30)  78 (2019 01:00)  102 (2019 00:00)  153 (2019 23:00)  175 (2019 22:00)  167 (2019 21:00)  147 (2019 20:00)      POCT Blood Glucose.: 217 mg/dL (2019 12:07)  POCT Blood Glucose.: 130 mg/dL (2019 07:55)  POCT Blood Glucose.: 150 mg/dL (2019 06:41)  POCT Blood Glucose.: 159 mg/dL (2019 05:49)  POCT Blood Glucose.: 149 mg/dL (2019 04:44)  POCT Blood Glucose.: 163 mg/dL (2019 03:56)  POCT Blood Glucose.: 137 mg/dL (2019 03:15)  POCT Blood Glucose.: 131 mg/dL (2019 01:53)  POCT Blood Glucose.: 118 mg/dL (2019 01:29)  POCT Blood Glucose.: 78 mg/dL (2019 00:55)  POCT Blood Glucose.: 153 mg/dL (2019 22:48)  POCT Blood Glucose.: 175 mg/dL (2019 21:46)  POCT Blood Glucose.: 165 mg/dL (2019 20:55)  POCT Blood Glucose.: 147 mg/dL (2019 19:59)  POCT Blood Glucose.: 184 mg/dL (2019 18:50)  POCT Blood Glucose.: 189 mg/dL (2019 17:54)  POCT Blood Glucose.: 98 mg/dL (2019 17:11)          aspirin  chewable 81 milliGRAM(s) Oral daily  atorvastatin 40 milliGRAM(s) Oral at bedtime  chlorhexidine 2% Cloths 1 Application(s) Topical <User Schedule>  dextrose 50% Injectable 50 milliLiter(s) IV Push every 15 minutes  dextrose 50% Injectable 25 milliLiter(s) IV Push every 15 minutes  docusate sodium 100 milliGRAM(s) Oral three times a day  escitalopram 10 milliGRAM(s) Oral daily  famotidine    Tablet 20 milliGRAM(s) Oral two times a day  heparin  Injectable 5000 Unit(s) SubCutaneous every 8 hours  insulin lispro (HumaLOG) corrective regimen sliding scale   SubCutaneous at bedtime  insulin lispro (HumaLOG) corrective regimen sliding scale   SubCutaneous three times a day before meals  metoprolol tartrate 25 milliGRAM(s) Oral two times a day  oxyCODONE    5 mG/acetaminophen 325 mG 1 Tablet(s) Oral every 6 hours PRN  oxyCODONE    5 mG/acetaminophen 325 mG 2 Tablet(s) Oral every 6 hours PRN  polyethylene glycol 3350 17 Gram(s) Oral daily  sodium chloride 0.9% lock flush 3 milliLiter(s) IV Push every 8 hours      PHYSICAL EXAM    Subjective: "I'm ok."   Neurology: alert and oriented x 3, nonfocal, no gross deficits  CV : tele:  RSR 70-80   Sternal Wound :  CDI with dressing , Stable; +pw- VVI 45  Lungs: clear. RR easy, unlabored:  LEFT ct: + serous sanguinous drainage   Abdomen: soft, nontender, nondistended, positive bowel sounds, neg  bowel movement   Neg N/V/D   :  pt voiding without difficulty   Extremities:   TINOCO; trace LE edema, neg calf tenderness.   PPP bilaterally; right svg site cdi with dressing + ghassan rle       PW: pw- VVI 45  Chest tubes: + LEFT ct: + serous sanguinous drainage

## 2019-07-30 DIAGNOSIS — E11.9 TYPE 2 DIABETES MELLITUS WITHOUT COMPLICATIONS: ICD-10-CM

## 2019-07-30 LAB
ANION GAP SERPL CALC-SCNC: 9 MMOL/L — SIGNIFICANT CHANGE UP (ref 5–17)
BUN SERPL-MCNC: 10 MG/DL — SIGNIFICANT CHANGE UP (ref 7–23)
CALCIUM SERPL-MCNC: 8.2 MG/DL — LOW (ref 8.4–10.5)
CHLORIDE SERPL-SCNC: 103 MMOL/L — SIGNIFICANT CHANGE UP (ref 96–108)
CO2 SERPL-SCNC: 26 MMOL/L — SIGNIFICANT CHANGE UP (ref 22–31)
CREAT SERPL-MCNC: 0.48 MG/DL — LOW (ref 0.5–1.3)
GLUCOSE BLDC GLUCOMTR-MCNC: 100 MG/DL — HIGH (ref 70–99)
GLUCOSE BLDC GLUCOMTR-MCNC: 178 MG/DL — HIGH (ref 70–99)
GLUCOSE BLDC GLUCOMTR-MCNC: 182 MG/DL — HIGH (ref 70–99)
GLUCOSE BLDC GLUCOMTR-MCNC: 309 MG/DL — HIGH (ref 70–99)
GLUCOSE SERPL-MCNC: 170 MG/DL — HIGH (ref 70–99)
HCT VFR BLD CALC: 23.6 % — LOW (ref 34.5–45)
HGB BLD-MCNC: 8.3 G/DL — LOW (ref 11.5–15.5)
MCHC RBC-ENTMCNC: 32.2 PG — SIGNIFICANT CHANGE UP (ref 27–34)
MCHC RBC-ENTMCNC: 35.1 GM/DL — SIGNIFICANT CHANGE UP (ref 32–36)
MCV RBC AUTO: 91.7 FL — SIGNIFICANT CHANGE UP (ref 80–100)
PLATELET # BLD AUTO: 115 K/UL — LOW (ref 150–400)
POTASSIUM SERPL-MCNC: 4.5 MMOL/L — SIGNIFICANT CHANGE UP (ref 3.5–5.3)
POTASSIUM SERPL-SCNC: 4.5 MMOL/L — SIGNIFICANT CHANGE UP (ref 3.5–5.3)
RBC # BLD: 2.58 M/UL — LOW (ref 3.8–5.2)
RBC # FLD: 12.7 % — SIGNIFICANT CHANGE UP (ref 10.3–14.5)
SODIUM SERPL-SCNC: 138 MMOL/L — SIGNIFICANT CHANGE UP (ref 135–145)
WBC # BLD: 9.2 K/UL — SIGNIFICANT CHANGE UP (ref 3.8–10.5)
WBC # FLD AUTO: 9.2 K/UL — SIGNIFICANT CHANGE UP (ref 3.8–10.5)

## 2019-07-30 PROCEDURE — 71045 X-RAY EXAM CHEST 1 VIEW: CPT | Mod: 26,77

## 2019-07-30 PROCEDURE — 71045 X-RAY EXAM CHEST 1 VIEW: CPT | Mod: 26

## 2019-07-30 RX ORDER — INSULIN LISPRO 100/ML
2 VIAL (ML) SUBCUTANEOUS
Refills: 0 | Status: DISCONTINUED | OUTPATIENT
Start: 2019-07-30 | End: 2019-08-02

## 2019-07-30 RX ORDER — METFORMIN HYDROCHLORIDE 850 MG/1
1000 TABLET ORAL
Refills: 0 | Status: DISCONTINUED | OUTPATIENT
Start: 2019-07-30 | End: 2019-08-02

## 2019-07-30 RX ADMIN — Medication 25 MILLIGRAM(S): at 17:35

## 2019-07-30 RX ADMIN — Medication 81 MILLIGRAM(S): at 11:18

## 2019-07-30 RX ADMIN — FAMOTIDINE 20 MILLIGRAM(S): 10 INJECTION INTRAVENOUS at 05:50

## 2019-07-30 RX ADMIN — OXYCODONE AND ACETAMINOPHEN 1 TABLET(S): 5; 325 TABLET ORAL at 08:32

## 2019-07-30 RX ADMIN — FAMOTIDINE 20 MILLIGRAM(S): 10 INJECTION INTRAVENOUS at 17:35

## 2019-07-30 RX ADMIN — Medication 100 MILLIGRAM(S): at 05:50

## 2019-07-30 RX ADMIN — SODIUM CHLORIDE 3 MILLILITER(S): 9 INJECTION INTRAMUSCULAR; INTRAVENOUS; SUBCUTANEOUS at 22:05

## 2019-07-30 RX ADMIN — Medication 14: at 12:27

## 2019-07-30 RX ADMIN — Medication 100 MILLIGRAM(S): at 22:08

## 2019-07-30 RX ADMIN — ESCITALOPRAM OXALATE 10 MILLIGRAM(S): 10 TABLET, FILM COATED ORAL at 11:18

## 2019-07-30 RX ADMIN — Medication 100 MILLIGRAM(S): at 13:49

## 2019-07-30 RX ADMIN — HEPARIN SODIUM 5000 UNIT(S): 5000 INJECTION INTRAVENOUS; SUBCUTANEOUS at 05:50

## 2019-07-30 RX ADMIN — METFORMIN HYDROCHLORIDE 1000 MILLIGRAM(S): 850 TABLET ORAL at 17:34

## 2019-07-30 RX ADMIN — ATORVASTATIN CALCIUM 40 MILLIGRAM(S): 80 TABLET, FILM COATED ORAL at 22:08

## 2019-07-30 RX ADMIN — OXYCODONE AND ACETAMINOPHEN 1 TABLET(S): 5; 325 TABLET ORAL at 18:10

## 2019-07-30 RX ADMIN — HEPARIN SODIUM 5000 UNIT(S): 5000 INJECTION INTRAVENOUS; SUBCUTANEOUS at 13:48

## 2019-07-30 RX ADMIN — Medication 2 UNIT(S): at 17:34

## 2019-07-30 RX ADMIN — SPIRONOLACTONE 25 MILLIGRAM(S): 25 TABLET, FILM COATED ORAL at 05:50

## 2019-07-30 RX ADMIN — SODIUM CHLORIDE 3 MILLILITER(S): 9 INJECTION INTRAMUSCULAR; INTRAVENOUS; SUBCUTANEOUS at 13:50

## 2019-07-30 RX ADMIN — CHLORHEXIDINE GLUCONATE 1 APPLICATION(S): 213 SOLUTION TOPICAL at 08:31

## 2019-07-30 RX ADMIN — OXYCODONE AND ACETAMINOPHEN 1 TABLET(S): 5; 325 TABLET ORAL at 17:40

## 2019-07-30 RX ADMIN — SODIUM CHLORIDE 3 MILLILITER(S): 9 INJECTION INTRAMUSCULAR; INTRAVENOUS; SUBCUTANEOUS at 05:46

## 2019-07-30 RX ADMIN — Medication 25 MILLIGRAM(S): at 05:50

## 2019-07-30 RX ADMIN — OXYCODONE AND ACETAMINOPHEN 1 TABLET(S): 5; 325 TABLET ORAL at 09:00

## 2019-07-30 RX ADMIN — Medication 10: at 07:41

## 2019-07-30 RX ADMIN — HEPARIN SODIUM 5000 UNIT(S): 5000 INJECTION INTRAVENOUS; SUBCUTANEOUS at 22:08

## 2019-07-30 RX ADMIN — Medication 40 MILLIGRAM(S): at 05:50

## 2019-07-30 NOTE — PROGRESS NOTE ADULT - SUBJECTIVE AND OBJECTIVE BOX
Subjective:  "I just feel weak, my legs are stiff"  Family at bedside, questions answered      Tele:  SR   80s           V/S:                    T(F): 97.9 (19 @ 04:57), Max: 98.4 (19 @ 13:40)  HR: 78 (19 @ 04:57) (77 - 89)  BP: 118/84 (19 @ 04:57) (108/72 - 122/73)  RR: 18 (19 @ 09:25) (18 - 18)  SpO2: 98% (19 @ 09:25) (92% - 100%)  Wt(kg): --      LV EF:      Labs:      138  |  103  |  10  ----------------------------<  170<H>  4.5   |  26  |  0.48<L>    Ca    8.2<L>      2019 06:54  Phos  1.6       Mg     2.4         TPro  5.6<L>  /  Alb  3.6  /  TBili  0.5  /  DBili  x   /  AST  25  /  ALT  15  /  AlkPhos  32<L>                                 8.3    9.2   )-----------( 115      ( 2019 06:54 )             23.6             CAPILLARY BLOOD GLUCOSE      POCT Blood Glucose.: 309 mg/dL (2019 11:47)  POCT Blood Glucose.: 182 mg/dL (2019 07:38)  POCT Blood Glucose.: 218 mg/dL (2019 21:46)  POCT Blood Glucose.: 118 mg/dL (2019 19:01)           CXR :from: Xray Chest 1 View- PORTABLE-Routine (19 @ 05:44) >  Status post right IJ central line removal.  Interval decrease of left pleural effusion.    < end of copied text >    I&O's Detail    2019 07:01  -  2019 07:00  --------------------------------------------------------  IN:<       insulin regular Infusion: 3 mL    IV PiggyBack: 62.5 mL    Oral Fluid: 750 mL    sodium chloride 0.9%: 10 mL  Total IN: 825.5 mL    OUT:    Bulb: 10 mL    Chest Tube: 380 mL    Indwelling Catheter - Urethral: 150 mL    Voided: 1250 mL  Total OUT: 1790 mL    Total NET: -964.5 mL      2019 07:01  -  2019 12:11  --------------------------------------------------------  IN:    Oral Fluid: 200 mL  Total IN: 200 mL    OUT:  Total OUT: 0 mL    Total NET: 200 mL          CHEST TUBE:  [x ] YES [ ] NO  OUTPUT:     per 24 hours    380  ml> 80 last 12 hrs    Leg OXANA  DRAIN:   [x ] YES [ ] NO  OUTPUT:    30  ml    per 24 hours    EPICARDIAL WIRES:  [x ] YES [ ] NO      BOWEL MOVEMENT:  [ ] YES [x ] NO      Daily     Daily Weight in k.3 (2019 08:00)  Medications:  aspirin  chewable 81 milliGRAM(s) Oral daily  atorvastatin 40 milliGRAM(s) Oral at bedtime  chlorhexidine 2% Cloths 1 Application(s) Topical <User Schedule>  dextrose 50% Injectable 50 milliLiter(s) IV Push every 15 minutes  dextrose 50% Injectable 25 milliLiter(s) IV Push every 15 minutes  docusate sodium 100 milliGRAM(s) Oral three times a day  escitalopram 10 milliGRAM(s) Oral daily  famotidine    Tablet 20 milliGRAM(s) Oral two times a day  furosemide    Tablet 40 milliGRAM(s) Oral daily  heparin  Injectable 5000 Unit(s) SubCutaneous every 8 hours  insulin lispro (HumaLOG) corrective regimen sliding scale   SubCutaneous at bedtime  insulin lispro (HumaLOG) corrective regimen sliding scale   SubCutaneous three times a day before meals  metoprolol tartrate 25 milliGRAM(s) Oral two times a day  oxyCODONE    5 mG/acetaminophen 325 mG 1 Tablet(s) Oral every 6 hours PRN  oxyCODONE    5 mG/acetaminophen 325 mG 2 Tablet(s) Oral every 6 hours PRN  polyethylene glycol 3350 17 Gram(s) Oral daily  sodium chloride 0.9% lock flush 3 milliLiter(s) IV Push every 8 hours  spironolactone 25 milliGRAM(s) Oral daily        Physical Exam:    Neuro: alert, no deficits      Pulm: diminished at bilateral bases, demonstrates proper use incentive spirometer  L pleural tube insitu> serosang drainage    CV:  S1S2  RRR   EPM   VVI   45/10    Abd: softly distended non tender No BM x 3 days    Extremities: RLE w/ OXANA from SVG site   DSD   Trace edema B/l   lower extrem    Incision(s): DSD in place   Sternum stable                 PAST MEDICAL & SURGICAL HISTORY:  Gall stone pancreatitis: S/P laparoscopic cholecystectomy  Low back pain  Diabetes mellitus, type II  Benign hypertension  History of cholecystectomy: laparoscopic cholecystectomy  H/O:

## 2019-07-30 NOTE — PROGRESS NOTE ADULT - SUBJECTIVE AND OBJECTIVE BOX
S: Pain controlled. No SOB. Review of systems otherwise (-)      MEDICATIONS  (STANDING):  aspirin  chewable 81 milliGRAM(s) Oral daily  atorvastatin 40 milliGRAM(s) Oral at bedtime  chlorhexidine 2% Cloths 1 Application(s) Topical <User Schedule>  dextrose 50% Injectable 50 milliLiter(s) IV Push every 15 minutes  dextrose 50% Injectable 25 milliLiter(s) IV Push every 15 minutes  docusate sodium 100 milliGRAM(s) Oral three times a day  escitalopram 10 milliGRAM(s) Oral daily  famotidine    Tablet 20 milliGRAM(s) Oral two times a day  furosemide    Tablet 40 milliGRAM(s) Oral daily  heparin  Injectable 5000 Unit(s) SubCutaneous every 8 hours  insulin lispro (HumaLOG) corrective regimen sliding scale   SubCutaneous at bedtime  insulin lispro (HumaLOG) corrective regimen sliding scale   SubCutaneous three times a day before meals  insulin lispro Injectable (HumaLOG) 2 Unit(s) SubCutaneous before breakfast  insulin lispro Injectable (HumaLOG) 2 Unit(s) SubCutaneous before lunch  insulin lispro Injectable (HumaLOG) 2 Unit(s) SubCutaneous before dinner  metFORMIN 1000 milliGRAM(s) Oral two times a day  metoprolol tartrate 25 milliGRAM(s) Oral two times a day  polyethylene glycol 3350 17 Gram(s) Oral daily  sodium chloride 0.9% lock flush 3 milliLiter(s) IV Push every 8 hours  spironolactone 25 milliGRAM(s) Oral daily    MEDICATIONS  (PRN):  oxyCODONE    5 mG/acetaminophen 325 mG 1 Tablet(s) Oral every 6 hours PRN Moderate Pain (4 - 6)  oxyCODONE    5 mG/acetaminophen 325 mG 2 Tablet(s) Oral every 6 hours PRN Severe Pain (7 - 10)      LABS:                            8.3    9.2   )-----------( 115      ( 30 Jul 2019 06:54 )             23.6     Hemoglobin: 8.3 g/dL (07-30 @ 06:54)  Hemoglobin: 9.0 g/dL (07-29 @ 00:30)  Hemoglobin: 9.9 g/dL (07-28 @ 00:23)  Hemoglobin: 11.0 g/dL (07-27 @ 14:10)  Hemoglobin: 13.2 g/dL (07-26 @ 23:32)    07-30    138  |  103  |  10  ----------------------------<  170<H>  4.5   |  26  |  0.48<L>    Ca    8.2<L>      30 Jul 2019 06:54  Phos  1.6     07-29  Mg     2.4     07-29    TPro  5.6<L>  /  Alb  3.6  /  TBili  0.5  /  DBili  x   /  AST  25  /  ALT  15  /  AlkPhos  32<L>  07-29    Creatinine Trend: 0.48<--, 0.42<--, 0.56<--, 0.55<--, 0.64<--, 0.55<--     CARDIAC MARKERS ( 27 Jul 2019 13:59 )  x     / x     / 223 U/L / x     / 19.2 ng/mL        PHYSICAL EXAM  Vital Signs Last 24 Hrs  T(C): 36.6 (30 Jul 2019 04:57), Max: 36.9 (29 Jul 2019 13:40)  T(F): 97.9 (30 Jul 2019 04:57), Max: 98.4 (29 Jul 2019 13:40)  HR: 78 (30 Jul 2019 04:57) (77 - 89)  BP: 118/84 (30 Jul 2019 04:57) (108/72 - 122/73)  BP(mean): --  RR: 18 (30 Jul 2019 09:25) (18 - 18)  SpO2: 98% (30 Jul 2019 09:25) (92% - 100%)      Gen: Appears well in NAD  CV: N S1 S2 1/6 AVILA (+)2 Pulses B/l  Resp:  Clear to auscultation B/L, normal effort  GI: (+) BS Soft, NT, ND  Lymph:  (-)Edema, (-)obvious lymphadenopathy  Skin: Warm to touch, Normal turgor  Psych: Appropriate mood and affect      TELEMETRY: NSR 70-90    DATA:    < from: Transthoracic Echocardiogram (07.25.19 @ 17:31) >  CONCLUSIONS:  1. Mitral annular calcification, otherwise normal mitral  valve. No mitral regurgitation seen.  2. Increased relative wall thicknesswith normal left  ventricular mass index, consistent with concentric left  ventricular remodeling.  3. Normal left ventricular systolic function. No segmental  wall motion abnormalities.  4. Normal left ventricular diastolic function.  5. Normal right ventricular size and function.  *** Compared with echocardiogram of 6/2/2015, no  significant changes noted.  ------------------------------------------------------------------------  Confirmed on  7/26/2019 - 07:51:14 by Leo Bailey M.D.  ------------------------------------------------------------------------    < end of copied text >      < from: Nuclear Stress Test-Pharmacologic (07.26.19 @ 10:45) >  IMPRESSIONS:Abnormal Study  * Myocardial Perfusion SPECT results are abnormal.  * There is a medium sized, moderate to severe defect in  lateral wall.  * Post-stress gated wall motion analysis was performed  (LVEF > 70%;LVEDV = 37 ml.), revealing overall normal LV  function with focal diminished systolic thickening in the  lateral wall.  Based on the EKG and nuclear findings, rest imaging was  not performed, cardiac catheterization was recommended.  ------------------------------------------------------------------------  Confirmed on  7/26/2019 - 13:50:42 by Judah Garcia M.D.    < end of copied text >  ASSESSMENT/PLAN: 	    58 yo F HTN, chol, DM with pre- syncope. Cardiology following for syncope, r/o ACS.   transferred to [place] for CABG, received IABP , S/P CABG.    - Progressing well  - Continue medical management of CAD - ASA/Statin/BB  - Pain control  - Chest tube management per CTS  - Physical therapy f/u  - Supportive Care per CTS  - Patient to f/u in office with Dr. Ponce upon discharge    [facility]  Pager: [phone]

## 2019-07-30 NOTE — PROGRESS NOTE ADULT - ASSESSMENT
This patient is a 59 year old Nepali and English speaking female with PMH of HTN, HLD, DM2 who presented to [facility] ED 7/25 with near syncope after arguing with her daughter. Patient also had acute onset nausea, diaphoresis, palpitations, dizziness, and mild chest pressure. NO chest pain, SOB, AGARWAL, PND, orthopnea, syncope, increased lower extremity edema, fever, chills, malaise, myalgias, anorexia, weight changes ( loss or gain), night sweats, generalized fatigue, BRBPR, melena, urinary symptoms, cough, and wheezing.   The patient had cath at [facility] which showed 95% left main and 95% RCA lesions. A right femoral IABP was placed, heparin drip started and patient transferred to [facility] for CABG eval.  s/p 7/27/19 C2L   POD #2  postop extubated/ insulin gttp weaned off  7/29 tx floor ; VSS/ RSR 80's; maintain left ct and rle ghassan til am   Discharge planning- pt eval   7/30  D/C pleural tube, leg GHASSAN  Resume preop oral DM  Rx

## 2019-07-31 LAB
ALBUMIN SERPL ELPH-MCNC: 3.8 G/DL — SIGNIFICANT CHANGE UP (ref 3.3–5)
ALP SERPL-CCNC: 46 U/L — SIGNIFICANT CHANGE UP (ref 40–120)
ALT FLD-CCNC: 27 U/L — SIGNIFICANT CHANGE UP (ref 10–45)
ANION GAP SERPL CALC-SCNC: 13 MMOL/L — SIGNIFICANT CHANGE UP (ref 5–17)
AST SERPL-CCNC: 23 U/L — SIGNIFICANT CHANGE UP (ref 10–40)
BASOPHILS # BLD AUTO: 0 K/UL — SIGNIFICANT CHANGE UP (ref 0–0.2)
BASOPHILS NFR BLD AUTO: 0 % — SIGNIFICANT CHANGE UP (ref 0–2)
BILIRUB SERPL-MCNC: 0.6 MG/DL — SIGNIFICANT CHANGE UP (ref 0.2–1.2)
BUN SERPL-MCNC: 9 MG/DL — SIGNIFICANT CHANGE UP (ref 7–23)
CALCIUM SERPL-MCNC: 8.7 MG/DL — SIGNIFICANT CHANGE UP (ref 8.4–10.5)
CHLORIDE SERPL-SCNC: 104 MMOL/L — SIGNIFICANT CHANGE UP (ref 96–108)
CO2 SERPL-SCNC: 26 MMOL/L — SIGNIFICANT CHANGE UP (ref 22–31)
CREAT SERPL-MCNC: 0.5 MG/DL — SIGNIFICANT CHANGE UP (ref 0.5–1.3)
EOSINOPHIL # BLD AUTO: 0.1 K/UL — SIGNIFICANT CHANGE UP (ref 0–0.5)
EOSINOPHIL NFR BLD AUTO: 1 % — SIGNIFICANT CHANGE UP (ref 0–6)
GLUCOSE BLDC GLUCOMTR-MCNC: 117 MG/DL — HIGH (ref 70–99)
GLUCOSE BLDC GLUCOMTR-MCNC: 126 MG/DL — HIGH (ref 70–99)
GLUCOSE BLDC GLUCOMTR-MCNC: 144 MG/DL — HIGH (ref 70–99)
GLUCOSE BLDC GLUCOMTR-MCNC: 173 MG/DL — HIGH (ref 70–99)
GLUCOSE BLDC GLUCOMTR-MCNC: 207 MG/DL — HIGH (ref 70–99)
GLUCOSE SERPL-MCNC: 190 MG/DL — HIGH (ref 70–99)
HCT VFR BLD CALC: 25.9 % — LOW (ref 34.5–45)
HGB BLD-MCNC: 8.7 G/DL — LOW (ref 11.5–15.5)
LYMPHOCYTES # BLD AUTO: 1.7 K/UL — SIGNIFICANT CHANGE UP (ref 1–3.3)
LYMPHOCYTES # BLD AUTO: 20.6 % — SIGNIFICANT CHANGE UP (ref 13–44)
MCHC RBC-ENTMCNC: 31 PG — SIGNIFICANT CHANGE UP (ref 27–34)
MCHC RBC-ENTMCNC: 33.4 GM/DL — SIGNIFICANT CHANGE UP (ref 32–36)
MCV RBC AUTO: 92.8 FL — SIGNIFICANT CHANGE UP (ref 80–100)
MONOCYTES # BLD AUTO: 0.7 K/UL — SIGNIFICANT CHANGE UP (ref 0–0.9)
MONOCYTES NFR BLD AUTO: 8.4 % — SIGNIFICANT CHANGE UP (ref 2–14)
NEUTROPHILS # BLD AUTO: 5.8 K/UL — SIGNIFICANT CHANGE UP (ref 1.8–7.4)
NEUTROPHILS NFR BLD AUTO: 70 % — SIGNIFICANT CHANGE UP (ref 43–77)
PLATELET # BLD AUTO: 163 K/UL — SIGNIFICANT CHANGE UP (ref 150–400)
POTASSIUM SERPL-MCNC: 3.9 MMOL/L — SIGNIFICANT CHANGE UP (ref 3.5–5.3)
POTASSIUM SERPL-SCNC: 3.9 MMOL/L — SIGNIFICANT CHANGE UP (ref 3.5–5.3)
PROT SERPL-MCNC: 6.3 G/DL — SIGNIFICANT CHANGE UP (ref 6–8.3)
RBC # BLD: 2.8 M/UL — LOW (ref 3.8–5.2)
RBC # FLD: 12.7 % — SIGNIFICANT CHANGE UP (ref 10.3–14.5)
SODIUM SERPL-SCNC: 143 MMOL/L — SIGNIFICANT CHANGE UP (ref 135–145)
WBC # BLD: 8.2 K/UL — SIGNIFICANT CHANGE UP (ref 3.8–10.5)
WBC # FLD AUTO: 8.2 K/UL — SIGNIFICANT CHANGE UP (ref 3.8–10.5)

## 2019-07-31 PROCEDURE — 99222 1ST HOSP IP/OBS MODERATE 55: CPT | Mod: GC

## 2019-07-31 RX ADMIN — ATORVASTATIN CALCIUM 40 MILLIGRAM(S): 80 TABLET, FILM COATED ORAL at 22:29

## 2019-07-31 RX ADMIN — Medication 100 MILLIGRAM(S): at 22:29

## 2019-07-31 RX ADMIN — OXYCODONE AND ACETAMINOPHEN 1 TABLET(S): 5; 325 TABLET ORAL at 18:08

## 2019-07-31 RX ADMIN — Medication 6: at 17:31

## 2019-07-31 RX ADMIN — METFORMIN HYDROCHLORIDE 1000 MILLIGRAM(S): 850 TABLET ORAL at 17:31

## 2019-07-31 RX ADMIN — Medication 25 MILLIGRAM(S): at 05:32

## 2019-07-31 RX ADMIN — Medication 10: at 08:14

## 2019-07-31 RX ADMIN — METFORMIN HYDROCHLORIDE 1000 MILLIGRAM(S): 850 TABLET ORAL at 05:32

## 2019-07-31 RX ADMIN — Medication 2 UNIT(S): at 17:31

## 2019-07-31 RX ADMIN — Medication 2 UNIT(S): at 12:54

## 2019-07-31 RX ADMIN — SODIUM CHLORIDE 3 MILLILITER(S): 9 INJECTION INTRAMUSCULAR; INTRAVENOUS; SUBCUTANEOUS at 21:50

## 2019-07-31 RX ADMIN — HEPARIN SODIUM 5000 UNIT(S): 5000 INJECTION INTRAVENOUS; SUBCUTANEOUS at 05:31

## 2019-07-31 RX ADMIN — FAMOTIDINE 20 MILLIGRAM(S): 10 INJECTION INTRAVENOUS at 05:32

## 2019-07-31 RX ADMIN — Medication 40 MILLIGRAM(S): at 05:32

## 2019-07-31 RX ADMIN — CHLORHEXIDINE GLUCONATE 1 APPLICATION(S): 213 SOLUTION TOPICAL at 07:24

## 2019-07-31 RX ADMIN — SODIUM CHLORIDE 3 MILLILITER(S): 9 INJECTION INTRAMUSCULAR; INTRAVENOUS; SUBCUTANEOUS at 05:32

## 2019-07-31 RX ADMIN — FAMOTIDINE 20 MILLIGRAM(S): 10 INJECTION INTRAVENOUS at 17:31

## 2019-07-31 RX ADMIN — Medication 2 UNIT(S): at 08:14

## 2019-07-31 RX ADMIN — ESCITALOPRAM OXALATE 10 MILLIGRAM(S): 10 TABLET, FILM COATED ORAL at 13:04

## 2019-07-31 RX ADMIN — HEPARIN SODIUM 5000 UNIT(S): 5000 INJECTION INTRAVENOUS; SUBCUTANEOUS at 22:29

## 2019-07-31 RX ADMIN — SPIRONOLACTONE 25 MILLIGRAM(S): 25 TABLET, FILM COATED ORAL at 05:32

## 2019-07-31 RX ADMIN — Medication 3: at 12:54

## 2019-07-31 RX ADMIN — Medication 25 MILLIGRAM(S): at 17:31

## 2019-07-31 RX ADMIN — Medication 100 MILLIGRAM(S): at 05:32

## 2019-07-31 RX ADMIN — HEPARIN SODIUM 5000 UNIT(S): 5000 INJECTION INTRAVENOUS; SUBCUTANEOUS at 13:03

## 2019-07-31 RX ADMIN — Medication 81 MILLIGRAM(S): at 13:03

## 2019-07-31 RX ADMIN — OXYCODONE AND ACETAMINOPHEN 1 TABLET(S): 5; 325 TABLET ORAL at 17:38

## 2019-07-31 RX ADMIN — SODIUM CHLORIDE 3 MILLILITER(S): 9 INJECTION INTRAMUSCULAR; INTRAVENOUS; SUBCUTANEOUS at 13:04

## 2019-07-31 NOTE — OCCUPATIONAL THERAPY INITIAL EVALUATION ADULT - DIAGNOSIS, OT EVAL
Pt presents with impaired balance, decreased strength, decreased AROM, decreased endurance impacting independence with ADLs and ability to perform functional mobility.

## 2019-07-31 NOTE — PROGRESS NOTE ADULT - PROBLEM SELECTOR PLAN 3
Resume oral glycemics
D/C pleural tube
maintain chest tube til am  ck chest xray in am tue  pain management

## 2019-07-31 NOTE — CONSULT NOTE ADULT - SUBJECTIVE AND OBJECTIVE BOX
HPI:  This patient is a 59 year old Dive[place] and English speaking female with PMH of HTN, HLD, DM2 who presented to [facility] ED  with near syncope after arguing with her daughter. Patient also had acute onset nausea, diaphoresis, palpitations, dizziness, and mild chest pressure. NO chest pain, SOB, AGARWAL, PND, orthopnea, syncope, increased lower extremity edema, fever, chills, malaise, myalgias, anorexia, weight changes ( loss or gain), night sweats, generalized fatigue, BRBPR, melena, urinary symptoms, cough, and wheezing.   The patient had cath at [facility] which showed 95% left main and 95% RCA lesions. A right femoral IABP was placed, heparin drip started and patient transferred to [facility] for CABG   with Dr. Ramey.  Upon arrival patient admits to headache and denies CP, SOB, n/v, abdominal pain, lightheadedness, dizziness. (2019 23:19)  Tolerated well, chest tube removed  + cough     REVIEW OF SYSTEMS: No chest pain, shortness of breath, nausea, vomiting or diarhea.      PAST MEDICAL & SURGICAL HISTORY  Gall stone pancreatitis  Low back pain  Diabetes mellitus, type II  Benign hypertension  History of cholecystectomy  H/O:       SOCIAL HISTORY  Smoking - Denied, EtOH - Denied, Drugs - Denied    FUNCTIONAL HISTORY:   Lives with family, ^ [employer]   Independent PTA      CURRENT FUNCTIONAL STATUS: CGA/min a       FAMILY HISTORY   Family history of cancer  Family history of diabetes mellitus (DM)  No pertinent family history in first degree relatives      RECENT LABS/IMAGING  CBC Full  -  ( 2019 06:06 )  WBC Count : 8.2 K/uL  RBC Count : 2.80 M/uL  Hemoglobin : 8.7 g/dL  Hematocrit : 25.9 %  Platelet Count - Automated : 163 K/uL  Mean Cell Volume : 92.8 fl  Mean Cell Hemoglobin : 31.0 pg  Mean Cell Hemoglobin Concentration : 33.4 gm/dL  Auto Neutrophil # : 5.8 K/uL  Auto Lymphocyte # : 1.7 K/uL  Auto Monocyte # : 0.7 K/uL  Auto Eosinophil # : 0.1 K/uL  Auto Basophil # : 0.0 K/uL  Auto Neutrophil % : 70.0 %  Auto Lymphocyte % : 20.6 %  Auto Monocyte % : 8.4 %  Auto Eosinophil % : 1.0 %  Auto Basophil % : 0.0 %    07-    143  |  104  |  9   ----------------------------<  190<H>  3.9   |  26  |  0.50    Ca    8.7      2019 06:06    TPro  6.3  /  Alb  3.8  /  TBili  0.6  /  DBili  x   /  AST  23  /  ALT  27  /  AlkPhos  46          VITALS  T(C): 36.8 (19 @ 12:53), Max: 37.1 (19 @ 19:57)  HR: 85 (19 @ 12:53) (70 - 85)  BP: 116/72 (19 @ 12:53) (96/58 - 147/75)  RR: 18 (19 @ 12:53) (16 - 18)  SpO2: 98% (19 @ 12:53) (93% - 98%)  Wt(kg): --    ALLERGIES  metronidazole (Rash)  Norvasc (Swelling)      MEDICATIONS   aspirin  chewable 81 milliGRAM(s) Oral daily  atorvastatin 40 milliGRAM(s) Oral at bedtime  chlorhexidine 2% Cloths 1 Application(s) Topical <User Schedule>  dextrose 50% Injectable 50 milliLiter(s) IV Push every 15 minutes  dextrose 50% Injectable 25 milliLiter(s) IV Push every 15 minutes  docusate sodium 100 milliGRAM(s) Oral three times a day  escitalopram 10 milliGRAM(s) Oral daily  famotidine    Tablet 20 milliGRAM(s) Oral two times a day  furosemide    Tablet 40 milliGRAM(s) Oral daily  heparin  Injectable 5000 Unit(s) SubCutaneous every 8 hours  insulin lispro (HumaLOG) corrective regimen sliding scale   SubCutaneous at bedtime  insulin lispro (HumaLOG) corrective regimen sliding scale   SubCutaneous three times a day before meals  insulin lispro Injectable (HumaLOG) 2 Unit(s) SubCutaneous before breakfast  insulin lispro Injectable (HumaLOG) 2 Unit(s) SubCutaneous before lunch  insulin lispro Injectable (HumaLOG) 2 Unit(s) SubCutaneous before dinner  metFORMIN 1000 milliGRAM(s) Oral two times a day  metoprolol tartrate 25 milliGRAM(s) Oral two times a day  oxyCODONE    5 mG/acetaminophen 325 mG 1 Tablet(s) Oral every 6 hours PRN  oxyCODONE    5 mG/acetaminophen 325 mG 2 Tablet(s) Oral every 6 hours PRN  polyethylene glycol 3350 17 Gram(s) Oral daily  sodium chloride 0.9% lock flush 3 milliLiter(s) IV Push every 8 hours  spironolactone 25 milliGRAM(s) Oral daily      ----------------------------------------------------------------------------------------  PHYSICAL EXAM  Constitutional - NAD, Comfortable  HEENT - NCAT, EOMI  Neck - Supple, No limited ROM  Chest - CTA bilaterally, No wheeze, No rhonchi, No crackles  Cardiovascular - RRR, S1S2, No murmurs  Abdomen - BS+, Soft, NTND  Extremities - No C/C/E, No calf tenderness   Neurologic Exam -                    Cognitive - Awake, Alert, AAO to self, place, date, year, situation     Communication - Fluent, No dysarthria, no aphasia     Cranial Nerves - CN 2-12 intact     Motor - No focal deficits                       Sensory - Intact to LT     Reflexes - DTR Intact, No primitive reflexive     Balance - WNL Static  Psychiatric - Mood stable, Affect WNL HPI:  This patient is a 59 year old Slovak and English speaking female with PMH of HTN, HLD, DM2 who presented to [facility] ED  with near syncope after arguing with her daughter. Patient also had acute onset nausea, diaphoresis, palpitations, dizziness, and mild chest pressure. NO chest pain, SOB, AGARWAL, PND, orthopnea, syncope, increased lower extremity edema, fever, chills, malaise, myalgias, anorexia, weight changes ( loss or gain), night sweats, generalized fatigue, BRBPR, melena, urinary symptoms, cough, and wheezing.   The patient had cath at [facility] which showed 95% left main and 95% RCA lesions. A right femoral IABP was placed, heparin drip started and patient transferred to [facility] for CABG   with Dr. Ramey.  Upon arrival patient admits to headache and denies CP, SOB, n/v, abdominal pain, lightheadedness, dizziness. (2019 23:19)  Tolerated well, chest tube removed  + cough     REVIEW OF SYSTEMS: No chest pain, shortness of breath, nausea, vomiting or diarhea.      PAST MEDICAL & SURGICAL HISTORY  Gall stone pancreatitis  Low back pain  Diabetes mellitus, type II  Benign hypertension  History of cholecystectomy  H/O:       SOCIAL HISTORY  Smoking - Denied, EtOH - Denied, Drugs - Denied    FUNCTIONAL HISTORY:   Lives with family, ^ [employer]   Independent PTA      CURRENT FUNCTIONAL STATUS: CGA/min a       FAMILY HISTORY   Family history of cancer  Family history of diabetes mellitus (DM)  No pertinent family history in first degree relatives      RECENT LABS/IMAGING  CBC Full  -  ( 2019 06:06 )  WBC Count : 8.2 K/uL  RBC Count : 2.80 M/uL  Hemoglobin : 8.7 g/dL  Hematocrit : 25.9 %  Platelet Count - Automated : 163 K/uL  Mean Cell Volume : 92.8 fl  Mean Cell Hemoglobin : 31.0 pg  Mean Cell Hemoglobin Concentration : 33.4 gm/dL  Auto Neutrophil # : 5.8 K/uL  Auto Lymphocyte # : 1.7 K/uL  Auto Monocyte # : 0.7 K/uL  Auto Eosinophil # : 0.1 K/uL  Auto Basophil # : 0.0 K/uL  Auto Neutrophil % : 70.0 %  Auto Lymphocyte % : 20.6 %  Auto Monocyte % : 8.4 %  Auto Eosinophil % : 1.0 %  Auto Basophil % : 0.0 %    07-    143  |  104  |  9   ----------------------------<  190<H>  3.9   |  26  |  0.50    Ca    8.7      2019 06:06    TPro  6.3  /  Alb  3.8  /  TBili  0.6  /  DBili  x   /  AST  23  /  ALT  27  /  AlkPhos  46          VITALS  T(C): 36.8 (19 @ 12:53), Max: 37.1 (19 @ 19:57)  HR: 85 (19 @ 12:53) (70 - 85)  BP: 116/72 (19 @ 12:53) (96/58 - 147/75)  RR: 18 (19 @ 12:53) (16 - 18)  SpO2: 98% (19 @ 12:53) (93% - 98%)  Wt(kg): --    ALLERGIES  metronidazole (Rash)  Norvasc (Swelling)      MEDICATIONS   aspirin  chewable 81 milliGRAM(s) Oral daily  atorvastatin 40 milliGRAM(s) Oral at bedtime  chlorhexidine 2% Cloths 1 Application(s) Topical <User Schedule>  dextrose 50% Injectable 50 milliLiter(s) IV Push every 15 minutes  dextrose 50% Injectable 25 milliLiter(s) IV Push every 15 minutes  docusate sodium 100 milliGRAM(s) Oral three times a day  escitalopram 10 milliGRAM(s) Oral daily  famotidine    Tablet 20 milliGRAM(s) Oral two times a day  furosemide    Tablet 40 milliGRAM(s) Oral daily  heparin  Injectable 5000 Unit(s) SubCutaneous every 8 hours  insulin lispro (HumaLOG) corrective regimen sliding scale   SubCutaneous at bedtime  insulin lispro (HumaLOG) corrective regimen sliding scale   SubCutaneous three times a day before meals  insulin lispro Injectable (HumaLOG) 2 Unit(s) SubCutaneous before breakfast  insulin lispro Injectable (HumaLOG) 2 Unit(s) SubCutaneous before lunch  insulin lispro Injectable (HumaLOG) 2 Unit(s) SubCutaneous before dinner  metFORMIN 1000 milliGRAM(s) Oral two times a day  metoprolol tartrate 25 milliGRAM(s) Oral two times a day  oxyCODONE    5 mG/acetaminophen 325 mG 1 Tablet(s) Oral every 6 hours PRN  oxyCODONE    5 mG/acetaminophen 325 mG 2 Tablet(s) Oral every 6 hours PRN  polyethylene glycol 3350 17 Gram(s) Oral daily  sodium chloride 0.9% lock flush 3 milliLiter(s) IV Push every 8 hours  spironolactone 25 milliGRAM(s) Oral daily      ----------------------------------------------------------------------------------------  PHYSICAL EXAM  Constitutional - NAD, Comfortable  HEENT - NCAT, EOMI  Neck - Supple, No limited ROM  Chest -incision c/d/i   Cardiovascular - RRR, S1S2, No murmurs  Abdomen - BS+, Soft, NTND  Extremities - No C/C/E, No calf tenderness   Neurologic Exam -                    Cognitive - Awake, Alert, AAO to self, place, date, year, situation     Communication - Fluent, No dysarthria, no aphasia     Cranial Nerves - CN 2-12 intact     Motor - No focal deficits                       Sensory - Intact to LT     Reflexes - DTR Intact, No primitive reflexive     Balance - fair, gait is slow, wide based.   Psychiatric - flat

## 2019-07-31 NOTE — OCCUPATIONAL THERAPY INITIAL EVALUATION ADULT - LIVES WITH, PROFILE
spouse/Pt lives in 1st floor apt with 6 [address]. Pt has grab bar and shower chair in tub shower. Pt was independent with all ADLs and IADLs PTA.

## 2019-07-31 NOTE — PROGRESS NOTE ADULT - SUBJECTIVE AND OBJECTIVE BOX
S: +cough, Pain controlled. No SOB. Review of systems otherwise (-)      MEDICATIONS  (STANDING):  aspirin  chewable 81 milliGRAM(s) Oral daily  atorvastatin 40 milliGRAM(s) Oral at bedtime  chlorhexidine 2% Cloths 1 Application(s) Topical <User Schedule>  dextrose 50% Injectable 50 milliLiter(s) IV Push every 15 minutes  dextrose 50% Injectable 25 milliLiter(s) IV Push every 15 minutes  docusate sodium 100 milliGRAM(s) Oral three times a day  escitalopram 10 milliGRAM(s) Oral daily  famotidine    Tablet 20 milliGRAM(s) Oral two times a day  furosemide    Tablet 40 milliGRAM(s) Oral daily  heparin  Injectable 5000 Unit(s) SubCutaneous every 8 hours  insulin lispro (HumaLOG) corrective regimen sliding scale   SubCutaneous at bedtime  insulin lispro (HumaLOG) corrective regimen sliding scale   SubCutaneous three times a day before meals  insulin lispro Injectable (HumaLOG) 2 Unit(s) SubCutaneous before breakfast  insulin lispro Injectable (HumaLOG) 2 Unit(s) SubCutaneous before lunch  insulin lispro Injectable (HumaLOG) 2 Unit(s) SubCutaneous before dinner  metFORMIN 1000 milliGRAM(s) Oral two times a day  metoprolol tartrate 25 milliGRAM(s) Oral two times a day  polyethylene glycol 3350 17 Gram(s) Oral daily  sodium chloride 0.9% lock flush 3 milliLiter(s) IV Push every 8 hours  spironolactone 25 milliGRAM(s) Oral daily    MEDICATIONS  (PRN):  oxyCODONE    5 mG/acetaminophen 325 mG 1 Tablet(s) Oral every 6 hours PRN Moderate Pain (4 - 6)  oxyCODONE    5 mG/acetaminophen 325 mG 2 Tablet(s) Oral every 6 hours PRN Severe Pain (7 - 10)      LABS:                            8.7    8.2   )-----------( 163      ( 31 Jul 2019 06:06 )             25.9     Hemoglobin: 8.7 g/dL (07-31 @ 06:06)  Hemoglobin: 8.3 g/dL (07-30 @ 06:54)  Hemoglobin: 9.0 g/dL (07-29 @ 00:30)  Hemoglobin: 9.9 g/dL (07-28 @ 00:23)  Hemoglobin: 11.0 g/dL (07-27 @ 14:10)    07-31    143  |  104  |  9   ----------------------------<  190<H>  3.9   |  26  |  0.50    Ca    8.7      31 Jul 2019 06:06    TPro  6.3  /  Alb  3.8  /  TBili  0.6  /  DBili  x   /  AST  23  /  ALT  27  /  AlkPhos  46  07-31    Creatinine Trend: 0.50<--, 0.48<--, 0.42<--, 0.56<--, 0.55<--, 0.64<--           PHYSICAL EXAM  Vital Signs Last 24 Hrs  T(C): 36.8 (31 Jul 2019 09:34), Max: 37.1 (30 Jul 2019 19:57)  T(F): 98.2 (31 Jul 2019 09:34), Max: 98.8 (30 Jul 2019 19:57)  HR: 79 (31 Jul 2019 11:51) (70 - 84)  BP: 109/65 (31 Jul 2019 11:51) (94/57 - 147/75)  BP(mean): --  RR: 18 (31 Jul 2019 11:51) (16 - 18)  SpO2: 98% (31 Jul 2019 11:51) (93% - 98%)      Gen: Appears well in NAD  CV: N S1 S2 1/6 AVILA (+)2 Pulses B/l  Resp:  Clear to auscultation B/L, normal effort  GI: (+) BS Soft, NT, ND  Lymph:  (-)Edema, (-)obvious lymphadenopathy  Skin: Warm to touch, Normal turgor  Psych: Appropriate mood and affect      TELEMETRY: NSR 60-70    DATA:    < from: Transthoracic Echocardiogram (07.25.19 @ 17:31) >  CONCLUSIONS:  1. Mitral annular calcification, otherwise normal mitral  valve. No mitral regurgitation seen.  2. Increased relative wall thicknesswith normal left  ventricular mass index, consistent with concentric left  ventricular remodeling.  3. Normal left ventricular systolic function. No segmental  wall motion abnormalities.  4. Normal left ventricular diastolic function.  5. Normal right ventricular size and function.  *** Compared with echocardiogram of 6/2/2015, no  significant changes noted.  ------------------------------------------------------------------------  Confirmed on  7/26/2019 - 07:51:14 by Leo Bailey M.D.  ------------------------------------------------------------------------    < end of copied text >      < from: Nuclear Stress Test-Pharmacologic (07.26.19 @ 10:45) >  IMPRESSIONS:Abnormal Study  * Myocardial Perfusion SPECT results are abnormal.  * There is a medium sized, moderate to severe defect in  lateral wall.  * Post-stress gated wall motion analysis was performed  (LVEF > 70%;LVEDV = 37 ml.), revealing overall normal LV  function with focal diminished systolic thickening in the  lateral wall.  Based on the EKG and nuclear findings, rest imaging was  not performed, cardiac catheterization was recommended.  ------------------------------------------------------------------------  Confirmed on  7/26/2019 - 13:50:42 by Judah Garcia M.D.    < end of copied text >  ASSESSMENT/PLAN: 	    60 yo F HTN, chol, DM with pre- syncope. Cardiology following for syncope, r/o ACS.   transferred to [facility] for CABG, received IABP , S/P CABG.    - Continue medical management of CAD - ASA/Statin/BB  - Pain control  - S/p chest tube removal  - PT/OT  - Supportive Care per CTS  - Patient to f/u in office with Dr. Ponce upon discharge    Yunior Fish PA-C  [facility]  Pager: [phone]

## 2019-07-31 NOTE — OCCUPATIONAL THERAPY INITIAL EVALUATION ADULT - PERTINENT HX OF CURRENT PROBLEM, REHAB EVAL
59F who presented to [facility] ED 7/25 with near syncope after arguing with her daughter. Patient also had acute onset nausea, diaphoresis, palpitations, dizziness, and mild chest pressure. NO chest pain, SOB, AGARWAL, PND, orthopnea, syncope, increased lower extremity edema, fever, chills, malaise, myalgias, anorexia, weight changes ( loss or gain), night sweats, generalized fatigue, BRBPR, melena, urinary symptoms, cough, and wheezing.

## 2019-07-31 NOTE — PROGRESS NOTE ADULT - SUBJECTIVE AND OBJECTIVE BOX
S:  feels weak.  No sob.  Pain controlled    Telemetry:  SR 70-90    Vital Signs Last 24 Hrs  T(C): 36.8 (19 @ 12:53), Max: 37.1 (19 @ 19:57)  T(F): 98.2 (19 @ 12:53), Max: 98.8 (19 @ 19:57)  HR: 85 (19 @ 12:53) (70 - 85)  BP: 116/72 (19 @ 12:53) (96/58 - 147/75)  RR: 18 (19 @ 12:53) (16 - 18)  SpO2: 98% (19 @ 12:53) (93% - 98%)                    @ 07:01  -   @ 07:00  --------------------------------------------------------  IN: 1140 mL / OUT: 2050 mL / NET: -910 mL     @ 07:01  -   @ 15:35  --------------------------------------------------------  IN: 240 mL / OUT: 400 mL / NET: -160 mL          Daily Weight in k.2 (2019 08:48)        POCT Blood Glucose.: 144 mg/dL (2019 12:32)  POCT Blood Glucose.: 126 mg/dL (2019 10:48)  POCT Blood Glucose.: 207 mg/dL (2019 08:02)  POCT Blood Glucose.: 178 mg/dL (2019 22:07)  POCT Blood Glucose.: 100 mg/dL (2019 17:29)          Drains:     MS         [  ] Drainage:                 L Pleural  [  ]  Drainage:                R Pleural  [  ]  Drainage:    Pacing Wires        [  ]   Settings:                                  Isolated  [ x ]    Coumadin    [ ] YES          [ x ]      NO         Reason:                                 8.7    8.2   )-----------( 163      ( 2019 06:06 )             25.9           143  |  104  |  9   ----------------------------<  190<H>  3.9   |  26  |  0.50    Ca    8.7      2019 06:06    TPro  6.3  /  Alb  3.8  /  TBili  0.6  /  DBili  x   /  AST  23  /  ALT  27  /  AlkPhos  46            PHYSICAL EXAM:    Neurology: alert and oriented x 3, nonfocal, no gross deficits    CV :  S1S2 heard RRR    Sternal Wound :  CDI , Stable    Lungs: clear to ausc.  No w/r/r    Abdomen: soft, nontender, nondistended, positive bowel sounds           Extremities:    no edema.  Inc CDI           aspirin  chewable 81 milliGRAM(s) Oral daily  atorvastatin 40 milliGRAM(s) Oral at bedtime  chlorhexidine 2% Cloths 1 Application(s) Topical <User Schedule>  dextrose 50% Injectable 50 milliLiter(s) IV Push every 15 minutes  dextrose 50% Injectable 25 milliLiter(s) IV Push every 15 minutes  docusate sodium 100 milliGRAM(s) Oral three times a day  escitalopram 10 milliGRAM(s) Oral daily  famotidine    Tablet 20 milliGRAM(s) Oral two times a day  furosemide    Tablet 40 milliGRAM(s) Oral daily  heparin  Injectable 5000 Unit(s) SubCutaneous every 8 hours  insulin lispro (HumaLOG) corrective regimen sliding scale   SubCutaneous at bedtime  insulin lispro (HumaLOG) corrective regimen sliding scale   SubCutaneous three times a day before meals  insulin lispro Injectable (HumaLOG) 2 Unit(s) SubCutaneous before breakfast  insulin lispro Injectable (HumaLOG) 2 Unit(s) SubCutaneous before lunch  insulin lispro Injectable (HumaLOG) 2 Unit(s) SubCutaneous before dinner  metFORMIN 1000 milliGRAM(s) Oral two times a day  metoprolol tartrate 25 milliGRAM(s) Oral two times a day  oxyCODONE    5 mG/acetaminophen 325 mG 1 Tablet(s) Oral every 6 hours PRN  oxyCODONE    5 mG/acetaminophen 325 mG 2 Tablet(s) Oral every 6 hours PRN  polyethylene glycol 3350 17 Gram(s) Oral daily  sodium chloride 0.9% lock flush 3 milliLiter(s) IV Push every 8 hours  spironolactone 25 milliGRAM(s) Oral daily                              Physical Therapy Rec:   Home  [  ]   Home w/ PT  [  ]  Rehab  [ x ]    Discussed with Cardiothoracic Team at AM rounds.

## 2019-07-31 NOTE — PROGRESS NOTE ADULT - ASSESSMENT
This patient is a 59 year old Welsh and English speaking female with PMH of HTN, HLD, DM2 who presented to [facility] ED 7/25 with near syncope after arguing with her daughter. Patient also had acute onset nausea, diaphoresis, palpitations, dizziness, and mild chest pressure. NO chest pain, SOB, AGARWAL, PND, orthopnea, syncope, increased lower extremity edema, fever, chills, malaise, myalgias, anorexia, weight changes ( loss or gain), night sweats, generalized fatigue, BRBPR, melena, urinary symptoms, cough, and wheezing.   The patient had cath at [facility] which showed 95% left main and 95% RCA lesions. A right femoral IABP was placed, heparin drip started and patient transferred to [facility] for CABG eval.  s/p 7/27/19 C2L   POD #2  postop extubated/ insulin gttp weaned off  7/29 tx floor ; VSS/ RSR 80's; maintain left ct and rle ghassan til am   Discharge planning- pt eval   7/30  D/C pleural tube, leg GHASSAN  Resume preop oral DM  Rx  7/31  HD stable.  + PW

## 2019-07-31 NOTE — OCCUPATIONAL THERAPY INITIAL EVALUATION ADULT - PRECAUTIONS/LIMITATIONS, REHAB EVAL
fall precautions/sternal precautions/The patient had cath at [facility] which showed 95% left main and 95% RCA lesions. A right femoral IABP was placed, heparin drip started and patient transferred to [facility] for CABGx2 7/27 with Dr. Ramey./cardiac precautions

## 2019-07-31 NOTE — OCCUPATIONAL THERAPY INITIAL EVALUATION ADULT - RANGE OF MOTION EXAMINATION, UPPER EXTREMITY
bilateral UE Active ROM was WFL  (within functional limits)/performed with adherance to sternal precautions/bilateral UE Passive ROM was WFL  (within functional limits)

## 2019-08-01 ENCOUNTER — TRANSCRIPTION ENCOUNTER (OUTPATIENT)
Age: 59
End: 2019-08-01

## 2019-08-01 LAB
ALBUMIN SERPL ELPH-MCNC: 4.1 G/DL — SIGNIFICANT CHANGE UP (ref 3.3–5)
ALP SERPL-CCNC: 55 U/L — SIGNIFICANT CHANGE UP (ref 40–120)
ALT FLD-CCNC: 29 U/L — SIGNIFICANT CHANGE UP (ref 10–45)
ANION GAP SERPL CALC-SCNC: 12 MMOL/L — SIGNIFICANT CHANGE UP (ref 5–17)
APPEARANCE UR: CLEAR — SIGNIFICANT CHANGE UP
AST SERPL-CCNC: 22 U/L — SIGNIFICANT CHANGE UP (ref 10–40)
BILIRUB SERPL-MCNC: 0.7 MG/DL — SIGNIFICANT CHANGE UP (ref 0.2–1.2)
BILIRUB UR-MCNC: NEGATIVE — SIGNIFICANT CHANGE UP
BUN SERPL-MCNC: 8 MG/DL — SIGNIFICANT CHANGE UP (ref 7–23)
CALCIUM SERPL-MCNC: 9.4 MG/DL — SIGNIFICANT CHANGE UP (ref 8.4–10.5)
CHLORIDE SERPL-SCNC: 96 MMOL/L — SIGNIFICANT CHANGE UP (ref 96–108)
CO2 SERPL-SCNC: 27 MMOL/L — SIGNIFICANT CHANGE UP (ref 22–31)
COLOR SPEC: SIGNIFICANT CHANGE UP
CREAT SERPL-MCNC: 0.52 MG/DL — SIGNIFICANT CHANGE UP (ref 0.5–1.3)
DIFF PNL FLD: NEGATIVE — SIGNIFICANT CHANGE UP
GLUCOSE BLDC GLUCOMTR-MCNC: 106 MG/DL — HIGH (ref 70–99)
GLUCOSE BLDC GLUCOMTR-MCNC: 130 MG/DL — HIGH (ref 70–99)
GLUCOSE BLDC GLUCOMTR-MCNC: 201 MG/DL — HIGH (ref 70–99)
GLUCOSE BLDC GLUCOMTR-MCNC: 212 MG/DL — HIGH (ref 70–99)
GLUCOSE SERPL-MCNC: 193 MG/DL — HIGH (ref 70–99)
GLUCOSE UR QL: NEGATIVE — SIGNIFICANT CHANGE UP
HCT VFR BLD CALC: 28.3 % — LOW (ref 34.5–45)
HGB BLD-MCNC: 9.8 G/DL — LOW (ref 11.5–15.5)
KETONES UR-MCNC: NEGATIVE — SIGNIFICANT CHANGE UP
LEUKOCYTE ESTERASE UR-ACNC: NEGATIVE — SIGNIFICANT CHANGE UP
MCHC RBC-ENTMCNC: 31.7 PG — SIGNIFICANT CHANGE UP (ref 27–34)
MCHC RBC-ENTMCNC: 34.6 GM/DL — SIGNIFICANT CHANGE UP (ref 32–36)
MCV RBC AUTO: 91.5 FL — SIGNIFICANT CHANGE UP (ref 80–100)
NITRITE UR-MCNC: NEGATIVE — SIGNIFICANT CHANGE UP
PH UR: 7.5 — SIGNIFICANT CHANGE UP (ref 5–8)
PLATELET # BLD AUTO: 281 K/UL — SIGNIFICANT CHANGE UP (ref 150–400)
POTASSIUM SERPL-MCNC: 4.1 MMOL/L — SIGNIFICANT CHANGE UP (ref 3.5–5.3)
POTASSIUM SERPL-SCNC: 4.1 MMOL/L — SIGNIFICANT CHANGE UP (ref 3.5–5.3)
PROT SERPL-MCNC: 6.7 G/DL — SIGNIFICANT CHANGE UP (ref 6–8.3)
PROT UR-MCNC: NEGATIVE — SIGNIFICANT CHANGE UP
RBC # BLD: 3.1 M/UL — LOW (ref 3.8–5.2)
RBC # FLD: 12.9 % — SIGNIFICANT CHANGE UP (ref 10.3–14.5)
SODIUM SERPL-SCNC: 135 MMOL/L — SIGNIFICANT CHANGE UP (ref 135–145)
SP GR SPEC: 1.01 — SIGNIFICANT CHANGE UP (ref 1.01–1.02)
UROBILINOGEN FLD QL: NEGATIVE — SIGNIFICANT CHANGE UP
WBC # BLD: 11.3 K/UL — HIGH (ref 3.8–10.5)
WBC # FLD AUTO: 11.3 K/UL — HIGH (ref 3.8–10.5)

## 2019-08-01 PROCEDURE — 71045 X-RAY EXAM CHEST 1 VIEW: CPT | Mod: 26

## 2019-08-01 RX ADMIN — OXYCODONE AND ACETAMINOPHEN 1 TABLET(S): 5; 325 TABLET ORAL at 15:40

## 2019-08-01 RX ADMIN — SODIUM CHLORIDE 3 MILLILITER(S): 9 INJECTION INTRAMUSCULAR; INTRAVENOUS; SUBCUTANEOUS at 05:31

## 2019-08-01 RX ADMIN — Medication 100 MILLIGRAM(S): at 05:29

## 2019-08-01 RX ADMIN — HEPARIN SODIUM 5000 UNIT(S): 5000 INJECTION INTRAVENOUS; SUBCUTANEOUS at 05:30

## 2019-08-01 RX ADMIN — Medication 3: at 17:19

## 2019-08-01 RX ADMIN — Medication 25 MILLIGRAM(S): at 17:19

## 2019-08-01 RX ADMIN — SODIUM CHLORIDE 3 MILLILITER(S): 9 INJECTION INTRAMUSCULAR; INTRAVENOUS; SUBCUTANEOUS at 14:30

## 2019-08-01 RX ADMIN — ATORVASTATIN CALCIUM 40 MILLIGRAM(S): 80 TABLET, FILM COATED ORAL at 21:23

## 2019-08-01 RX ADMIN — HEPARIN SODIUM 5000 UNIT(S): 5000 INJECTION INTRAVENOUS; SUBCUTANEOUS at 21:23

## 2019-08-01 RX ADMIN — Medication 2 UNIT(S): at 08:08

## 2019-08-01 RX ADMIN — Medication 100 MILLIGRAM(S): at 21:23

## 2019-08-01 RX ADMIN — OXYCODONE AND ACETAMINOPHEN 1 TABLET(S): 5; 325 TABLET ORAL at 09:00

## 2019-08-01 RX ADMIN — SODIUM CHLORIDE 3 MILLILITER(S): 9 INJECTION INTRAMUSCULAR; INTRAVENOUS; SUBCUTANEOUS at 22:17

## 2019-08-01 RX ADMIN — Medication 25 MILLIGRAM(S): at 05:30

## 2019-08-01 RX ADMIN — Medication 2 UNIT(S): at 17:20

## 2019-08-01 RX ADMIN — SPIRONOLACTONE 25 MILLIGRAM(S): 25 TABLET, FILM COATED ORAL at 05:29

## 2019-08-01 RX ADMIN — Medication 10: at 08:08

## 2019-08-01 RX ADMIN — FAMOTIDINE 20 MILLIGRAM(S): 10 INJECTION INTRAVENOUS at 05:29

## 2019-08-01 RX ADMIN — OXYCODONE AND ACETAMINOPHEN 1 TABLET(S): 5; 325 TABLET ORAL at 16:20

## 2019-08-01 RX ADMIN — ESCITALOPRAM OXALATE 10 MILLIGRAM(S): 10 TABLET, FILM COATED ORAL at 11:13

## 2019-08-01 RX ADMIN — Medication 2 UNIT(S): at 12:00

## 2019-08-01 RX ADMIN — CHLORHEXIDINE GLUCONATE 1 APPLICATION(S): 213 SOLUTION TOPICAL at 08:09

## 2019-08-01 RX ADMIN — Medication 81 MILLIGRAM(S): at 11:13

## 2019-08-01 RX ADMIN — METFORMIN HYDROCHLORIDE 1000 MILLIGRAM(S): 850 TABLET ORAL at 05:30

## 2019-08-01 RX ADMIN — METFORMIN HYDROCHLORIDE 1000 MILLIGRAM(S): 850 TABLET ORAL at 17:19

## 2019-08-01 RX ADMIN — Medication 10: at 12:00

## 2019-08-01 RX ADMIN — FAMOTIDINE 20 MILLIGRAM(S): 10 INJECTION INTRAVENOUS at 17:19

## 2019-08-01 RX ADMIN — Medication 40 MILLIGRAM(S): at 05:29

## 2019-08-01 RX ADMIN — HEPARIN SODIUM 5000 UNIT(S): 5000 INJECTION INTRAVENOUS; SUBCUTANEOUS at 14:32

## 2019-08-01 RX ADMIN — OXYCODONE AND ACETAMINOPHEN 1 TABLET(S): 5; 325 TABLET ORAL at 08:11

## 2019-08-01 NOTE — PROGRESS NOTE ADULT - SUBJECTIVE AND OBJECTIVE BOX
S: Feels tired. Pain controlled. Denies chest pain or SOB. Denies urinary symptoms. Review of systems otherwise (-)      MEDICATIONS  (STANDING):  aspirin  chewable 81 milliGRAM(s) Oral daily  atorvastatin 40 milliGRAM(s) Oral at bedtime  chlorhexidine 2% Cloths 1 Application(s) Topical <User Schedule>  dextrose 50% Injectable 50 milliLiter(s) IV Push every 15 minutes  dextrose 50% Injectable 25 milliLiter(s) IV Push every 15 minutes  docusate sodium 100 milliGRAM(s) Oral three times a day  escitalopram 10 milliGRAM(s) Oral daily  famotidine    Tablet 20 milliGRAM(s) Oral two times a day  furosemide    Tablet 40 milliGRAM(s) Oral daily  heparin  Injectable 5000 Unit(s) SubCutaneous every 8 hours  insulin lispro (HumaLOG) corrective regimen sliding scale   SubCutaneous at bedtime  insulin lispro (HumaLOG) corrective regimen sliding scale   SubCutaneous three times a day before meals  insulin lispro Injectable (HumaLOG) 2 Unit(s) SubCutaneous before breakfast  insulin lispro Injectable (HumaLOG) 2 Unit(s) SubCutaneous before lunch  insulin lispro Injectable (HumaLOG) 2 Unit(s) SubCutaneous before dinner  metFORMIN 1000 milliGRAM(s) Oral two times a day  metoprolol tartrate 25 milliGRAM(s) Oral two times a day  polyethylene glycol 3350 17 Gram(s) Oral daily  sodium chloride 0.9% lock flush 3 milliLiter(s) IV Push every 8 hours  spironolactone 25 milliGRAM(s) Oral daily    MEDICATIONS  (PRN):  oxyCODONE    5 mG/acetaminophen 325 mG 1 Tablet(s) Oral every 6 hours PRN Moderate Pain (4 - 6)  oxyCODONE    5 mG/acetaminophen 325 mG 2 Tablet(s) Oral every 6 hours PRN Severe Pain (7 - 10)      LABS:                            9.8    11.3  )-----------( 281      ( 01 Aug 2019 07:06 )             28.3     Hemoglobin: 9.8 g/dL (08-01 @ 07:06)  Hemoglobin: 8.7 g/dL (07-31 @ 06:06)  Hemoglobin: 8.3 g/dL (07-30 @ 06:54)  Hemoglobin: 9.0 g/dL (07-29 @ 00:30)  Hemoglobin: 9.9 g/dL (07-28 @ 00:23)    08-01    135  |  96  |  8   ----------------------------<  193<H>  4.1   |  27  |  0.52    Ca    9.4      01 Aug 2019 07:05    TPro  6.7  /  Alb  4.1  /  TBili  0.7  /  DBili  x   /  AST  22  /  ALT  29  /  AlkPhos  55  08-01    Creatinine Trend: 0.52<--, 0.50<--, 0.48<--, 0.42<--, 0.56<--, 0.55<--       PHYSICAL EXAM  Vital Signs Last 24 Hrs  T(C): 36.6 (01 Aug 2019 14:18), Max: 36.8 (01 Aug 2019 06:00)  T(F): 97.9 (01 Aug 2019 14:18), Max: 98.2 (01 Aug 2019 06:00)  HR: 92 (01 Aug 2019 14:18) (75 - 92)  BP: 108/64 (01 Aug 2019 14:18) (108/64 - 128/76)  BP(mean): --  RR: 18 (01 Aug 2019 14:18) (16 - 18)  SpO2: 98% (01 Aug 2019 14:18) (94% - 98%)      Gen: Appears well in NAD  CV: N S1 S2 1/6 AVILA (+)2 Pulses B/l  Resp:  Clear to auscultation B/L, normal effort  GI: (+) BS Soft, NT, ND  Lymph:  (-)Edema, (-)obvious lymphadenopathy  Skin: Warm to touch, Normal turgor  Psych: Appropriate mood and affect      TELEMETRY: NSR 70-80    DATA:    < from: Transthoracic Echocardiogram (07.25.19 @ 17:31) >  CONCLUSIONS:  1. Mitral annular calcification, otherwise normal mitral  valve. No mitral regurgitation seen.  2. Increased relative wall thicknesswith normal left  ventricular mass index, consistent with concentric left  ventricular remodeling.  3. Normal left ventricular systolic function. No segmental  wall motion abnormalities.  4. Normal left ventricular diastolic function.  5. Normal right ventricular size and function.  *** Compared with echocardiogram of 6/2/2015, no  significant changes noted.  ------------------------------------------------------------------------  Confirmed on  7/26/2019 - 07:51:14 by Leo Bailey M.D.  ------------------------------------------------------------------------    < end of copied text >      < from: Nuclear Stress Test-Pharmacologic (07.26.19 @ 10:45) >  IMPRESSIONS:Abnormal Study  * Myocardial Perfusion SPECT results are abnormal.  * There is a medium sized, moderate to severe defect in  lateral wall.  * Post-stress gated wall motion analysis was performed  (LVEF > 70%;LVEDV = 37 ml.), revealing overall normal LV  function with focal diminished systolic thickening in the  lateral wall.  Based on the EKG and nuclear findings, rest imaging was  not performed, cardiac catheterization was recommended.  ------------------------------------------------------------------------  Confirmed on  7/26/2019 - 13:50:42 by Judah Garcia M.D.    < end of copied text >  ASSESSMENT/PLAN: 	    60 yo F HTN, chol, DM with pre- syncope. Cardiology following for syncope, r/o ACS.   transferred to [place] for CABG, received IABP , S/P CABG.    - Continue medical management of CAD - ASA/Statin/BB  - Pain control  - Supportive Care per CTS  - D/C planning ROXANNA per primary team  - Patient to f/u in office with Dr. Ponce upon discharge    Yunior Fish PA-C  [facility]  Pager: [phone]

## 2019-08-01 NOTE — DISCHARGE NOTE PROVIDER - NSDCPNSUBOBJ_GEN_ALL_CORE
VITAL SIGNS        Telemetry:      Vital Signs Last 24 Hrs    T(C): 36.9 (19 @ 05:29), Max: 36.9 (19 @ 05:29)    T(F): 98.4 (19 @ 05:29), Max: 98.4 (19 @ 05:29)    HR: 76 (19 05:29) (72 - 92)    BP: 110/68 (19 @ 05:29) (105/65 - 110/68)    RR: 18 (19 @ 05:29) (18 - 18)    SpO2: 96% (19 @ 05:29) (96% - 98%)                 @ 07:01  -   @ 07:00    --------------------------------------------------------    IN: 350 mL / OUT: 1800 mL / NET: -1450 mL         @ 07:01  -   @ 11:56    --------------------------------------------------------    IN: 0 mL / OUT: 400 mL / NET: -400 mL             Daily       Daily Weight in k.7 (02 Aug 2019 07:59)    Admit Wt: Drug Dosing Weight    Height (cm): 152.4 (2019 22:57)    Weight (kg): 62.1 (2019 22:57)    BMI (kg/m2): 26.7 (2019 22:57)    BSA (m2): 1.59 (2019 22:57)         Daily Weight in k.7 (19 @ 07:59)        [facility]            138  |  99  |  9     ----------------------------<  148<H>    4.0   |  24  |  0.52        Ca    9.2      02 Aug 2019 06:51        TPro  6.7  /  Alb  4.1  /  TBili  0.7  /  DBili  x   /  AST  22  /  ALT  29  /  AlkPhos  55  08-                                       9.2      11.4  )-----------( 304      ( 02 Aug 2019 06:51 )               27.8                                CAPILLARY BLOOD GLUCOSE            POCT Blood Glucose.: 161 mg/dL (02 Aug 2019 07:54)    POCT Blood Glucose.: 106 mg/dL (01 Aug 2019 21:59)    POCT Blood Glucose.: 130 mg/dL (01 Aug 2019 16:55)                Drains:     MS       [  ]   [  ]            L Pleural [  ]            R Pleural  [  ]            OXANA  [  ]           Carter  [  ]        Pacing Wires      [  ]   Settings:                                  Isolated  [  ]                          CXR:            MEDICATIONS    aspirin  chewable 81 milliGRAM(s) Oral daily    atorvastatin 40 milliGRAM(s) Oral at bedtime    chlorhexidine 2% Cloths 1 Application(s) Topical <User Schedule>    dextrose 50% Injectable 50 milliLiter(s) IV Push every 15 minutes    dextrose 50% Injectable 25 milliLiter(s) IV Push every 15 minutes    docusate sodium 100 milliGRAM(s) Oral three times a day    escitalopram 10 milliGRAM(s) Oral daily    famotidine    Tablet 20 milliGRAM(s) Oral two times a day    furosemide    Tablet 40 milliGRAM(s) Oral daily    heparin  Injectable 5000 Unit(s) SubCutaneous every 8 hours    insulin lispro (HumaLOG) corrective regimen sliding scale   SubCutaneous at bedtime    insulin lispro (HumaLOG) corrective regimen sliding scale   SubCutaneous three times a day before meals    insulin lispro Injectable (HumaLOG) 2 Unit(s) SubCutaneous before breakfast    insulin lispro Injectable (HumaLOG) 2 Unit(s) SubCutaneous before lunch    insulin lispro Injectable (HumaLOG) 2 Unit(s) SubCutaneous before dinner    metFORMIN 1000 milliGRAM(s) Oral two times a day    metoprolol tartrate 25 milliGRAM(s) Oral two times a day    oxyCODONE    5 mG/acetaminophen 325 mG 1 Tablet(s) Oral every 6 hours PRN    oxyCODONE    5 mG/acetaminophen 325 mG 2 Tablet(s) Oral every 6 hours PRN    polyethylene glycol 3350 17 Gram(s) Oral daily    sodium chloride 0.9% lock flush 3 milliLiter(s) IV Push every 8 hours    spironolactone 25 milliGRAM(s) Oral daily            PHYSICAL EXAM            Neurology: alert and oriented x 3, nonfocal, no gross deficits    CV :S1S2    Sternal Wound :  CDI , Stable    Lungs: cta    Abdomen: soft, nontender, nondistended, positive bowel sounds, last bowel movement     :  void       Extremities:  warm to touch SVG site with some brusing no drainage no erythema                             PAST MEDICAL & SURGICAL HISTORY:    Gall stone pancreatitis: S/P laparoscopic cholecystectomy    Low back pain    Diabetes mellitus, type II    Benign hypertension    History of cholecystectomy: laparoscopic cholecystectomy    H/O:                                  Discussed with Cardiothoracic Team at AM rounds.

## 2019-08-01 NOTE — PROGRESS NOTE ADULT - ASSESSMENT
This patient is a 59 year old Estonian and English speaking female with PMH of HTN, HLD, DM2 who presented to [facility] ED 7/25 with near syncope after arguing with her daughter. Patient also had acute onset nausea, diaphoresis, palpitations, dizziness, and mild chest pressure. NO chest pain, SOB, AGARWAL, PND, orthopnea, syncope, increased lower extremity edema, fever, chills, malaise, myalgias, anorexia, weight changes ( loss or gain), night sweats, generalized fatigue, BRBPR, melena, urinary symptoms, cough, and wheezing.   The patient had cath at [facility] which showed 95% left main and 95% RCA lesions. A right femoral IABP was placed, heparin drip started and patient transferred to [facility] for CABG eval.  s/p 7/27/19 C2L   POD #2  postop extubated/ insulin gttp weaned off  7/29 tx floor ; VSS/ RSR 80's; maintain left ct and rle ghassan til am   Discharge planning- pt eval   7/30  D/C pleural tube, leg GHASSAN  Resume preop oral DM  Rx  7/31  HD stable.  + PW  8/1   ambulating,      rt thigh  with ecymotic areas w/ small thigh hematoma,   wbc  11 from  8   afebrile,   awaiting ins auth.  for rehab

## 2019-08-01 NOTE — DISCHARGE NOTE PROVIDER - NSDCCPCAREPLAN_GEN_ALL_CORE_FT
PRINCIPAL DISCHARGE DIAGNOSIS  Diagnosis: S/P CABG x 2  Assessment and Plan of Treatment: Please Call Dr Ramey's office once discharged from rehab and make an appointment to see him.  Please also follow up with all your other providers after discharge from rehab as well  Please do not drive until cleared by surgeon  Please do not lift anything greater than 5lbs  please walk daily  Continue to use your incentive spirometry      SECONDARY DISCHARGE DIAGNOSES  Diagnosis: Diabetes mellitus, type II  Assessment and Plan of Treatment: Continue your prescribed diabetic regimen as ordered. Please follow up with the provider who manages your sugars in the outpatient setting.

## 2019-08-01 NOTE — DISCHARGE NOTE PROVIDER - HOSPITAL COURSE
This patient is a 59 year old Icelandic and English speaking female with PMH of HTN, HLD, DM2 who presented to [facility] ED 7/25 with near syncope after arguing with her daughter. Patient also had acute onset nausea, diaphoresis, palpitations, dizziness, and mild chest pressure. NO chest pain, SOB, AGARWAL, PND, orthopnea, syncope, increased lower extremity edema, fever, chills, malaise, myalgias, anorexia, weight changes ( loss or gain), night sweats, generalized fatigue, BRBPR, melena, urinary symptoms, cough, and wheezing.     The patient had cath at [facility] which showed 95% left main and 95% RCA lesions. A right femoral IABP was placed, heparin drip started and patient transferred to [facility] for CABG eval.    s/p 7/27/19 C2L   POD #2    postop extubated/ insulin gttp weaned off    7/29 tx floor ; VSS/ RSR 80's; maintain left ct and rle ghassan til am     Discharge planning- pt eval     7/30  D/C pleural tube, leg GHASSAN  Resume preop oral DM  Rx    7/31  HD stable.  + PW    8/1   ambulating,      rt thigh  with ecymotic areas w/ small thigh hematoma,   wbc  11 from  8   afebrile,   awaiting ins auth.  for rehab    8/2 Medically stable for discharge as d/w with Dr. Ramey at bedside.

## 2019-08-01 NOTE — PROGRESS NOTE ADULT - PROBLEM SELECTOR PLAN 1
continue postop care  continue asa/ b-blockers/ statin  + pw- vvi 45  D/C left pleural ct   D/C leg   OXANA  pain managment  pulm toilet  increase activity as tolerated  Discharge planning- pt eval
continue postop care  pulm toilet
continue postop care  continue asa/ b-blockers/ statin  isolate pw  pain managment  pulm toilet  increase activity as tolerated  rehab when medically stable
continue postop care  continue asa/ b-blockers/ statin  + pw- vvi 45  maintain left pleural ct til am  pain managment  pulm toilet  increase activity as tolerated  Discharge planning- await pt eval

## 2019-08-01 NOTE — DISCHARGE NOTE PROVIDER - CARE PROVIDER_API CALL
Judah Ramey)  Thoracic and Cardiac Surgery  [address] [place] [address]  Phone: [phone]  Fax: [phone]  Follow Up Time:

## 2019-08-01 NOTE — PROGRESS NOTE ADULT - SUBJECTIVE AND OBJECTIVE BOX
VITAL SIGNS    Telemetry:     sr  70-80    Vital Signs Last 24 Hrs  T(C): 36.8 (19 @ 06:00), Max: 36.8 (19 @ 06:00)  T(F): 98.2 (19 @ 06:00), Max: 98.2 (19 @ 06:00)  HR: 80 (19 @ 06:00) (75 - 80)  BP: 128/76 (19 @ 06:00) (114/68 - 128/76)  RR: 16 (19 @ 06:00) (16 - 18)  SpO2: 94% (19 @ 06:00) (94% - 98%)                   Daily     Daily Weight in k.4 (01 Aug 2019 08:18)      Bilirubin Total, Serum: 0.7 mg/dL ( @ 07:05)    CAPILLARY BLOOD GLUCOSE      POCT Blood Glucose.: 201 mg/dL (01 Aug 2019 11:52)  POCT Blood Glucose.: 212 mg/dL (01 Aug 2019 07:52)  POCT Blood Glucose.: 117 mg/dL (2019 21:50)  POCT Blood Glucose.: 173 mg/dL (2019 17:29)          Pacing Wires                          Isolated                       PHYSICAL EXAM    Neurology: alert and oriented x 3, moves all extremities with no defecits  CV :  RRR  Sternal Wound :  CDI , Stable  with  pw  Lungs:   CTA B/L  Abdomen: soft, nontender, nondistended, positive bowel sounds, last bowel movement     Extremities:      rt thigh svg site w/ areas   of ecchymosis   small hematoma  upper thigh at incision

## 2019-08-02 ENCOUNTER — INPATIENT (INPATIENT)
Facility: HOSPITAL | Age: 59
LOS: 14 days | Discharge: HOME HEALTH | DRG: 949 | End: 2019-08-17
Attending: PHYSICAL MEDICINE & REHABILITATION | Admitting: PHYSICAL MEDICINE & REHABILITATION
Payer: MEDICAID

## 2019-08-02 ENCOUNTER — TRANSCRIPTION ENCOUNTER (OUTPATIENT)
Age: 59
End: 2019-08-02

## 2019-08-02 VITALS
RESPIRATION RATE: 14 BRPM | HEART RATE: 76 BPM | SYSTOLIC BLOOD PRESSURE: 112 MMHG | DIASTOLIC BLOOD PRESSURE: 59 MMHG | TEMPERATURE: 98 F | OXYGEN SATURATION: 98 %

## 2019-08-02 VITALS
DIASTOLIC BLOOD PRESSURE: 60 MMHG | OXYGEN SATURATION: 95 % | TEMPERATURE: 98 F | RESPIRATION RATE: 18 BRPM | SYSTOLIC BLOOD PRESSURE: 96 MMHG | HEART RATE: 69 BPM

## 2019-08-02 DIAGNOSIS — B49 UNSPECIFIED MYCOSIS: ICD-10-CM

## 2019-08-02 DIAGNOSIS — E78.5 HYPERLIPIDEMIA, UNSPECIFIED: ICD-10-CM

## 2019-08-02 DIAGNOSIS — R94.31 ABNORMAL ELECTROCARDIOGRAM [ECG] [EKG]: ICD-10-CM

## 2019-08-02 DIAGNOSIS — Z51.89 ENCOUNTER FOR OTHER SPECIFIED AFTERCARE: ICD-10-CM

## 2019-08-02 DIAGNOSIS — E11.9 TYPE 2 DIABETES MELLITUS WITHOUT COMPLICATIONS: ICD-10-CM

## 2019-08-02 DIAGNOSIS — E87.1 HYPO-OSMOLALITY AND HYPONATREMIA: ICD-10-CM

## 2019-08-02 DIAGNOSIS — I95.9 HYPOTENSION, UNSPECIFIED: ICD-10-CM

## 2019-08-02 DIAGNOSIS — R00.1 BRADYCARDIA, UNSPECIFIED: ICD-10-CM

## 2019-08-02 DIAGNOSIS — Z79.01 LONG TERM (CURRENT) USE OF ANTICOAGULANTS: ICD-10-CM

## 2019-08-02 DIAGNOSIS — D72.829 ELEVATED WHITE BLOOD CELL COUNT, UNSPECIFIED: ICD-10-CM

## 2019-08-02 DIAGNOSIS — J90 PLEURAL EFFUSION, NOT ELSEWHERE CLASSIFIED: ICD-10-CM

## 2019-08-02 DIAGNOSIS — Z48.812 ENCOUNTER FOR SURGICAL AFTERCARE FOLLOWING SURGERY ON THE CIRCULATORY SYSTEM: ICD-10-CM

## 2019-08-02 DIAGNOSIS — Z79.82 LONG TERM (CURRENT) USE OF ASPIRIN: ICD-10-CM

## 2019-08-02 DIAGNOSIS — R07.89 OTHER CHEST PAIN: ICD-10-CM

## 2019-08-02 DIAGNOSIS — R26.9 UNSPECIFIED ABNORMALITIES OF GAIT AND MOBILITY: ICD-10-CM

## 2019-08-02 DIAGNOSIS — Z79.4 LONG TERM (CURRENT) USE OF INSULIN: ICD-10-CM

## 2019-08-02 DIAGNOSIS — F39 UNSPECIFIED MOOD [AFFECTIVE] DISORDER: ICD-10-CM

## 2019-08-02 DIAGNOSIS — I25.10 ATHEROSCLEROTIC HEART DISEASE OF NATIVE CORONARY ARTERY WITHOUT ANGINA PECTORIS: ICD-10-CM

## 2019-08-02 DIAGNOSIS — I10 ESSENTIAL (PRIMARY) HYPERTENSION: ICD-10-CM

## 2019-08-02 DIAGNOSIS — K21.9 GASTRO-ESOPHAGEAL REFLUX DISEASE WITHOUT ESOPHAGITIS: ICD-10-CM

## 2019-08-02 DIAGNOSIS — D62 ACUTE POSTHEMORRHAGIC ANEMIA: ICD-10-CM

## 2019-08-02 DIAGNOSIS — E11.65 TYPE 2 DIABETES MELLITUS WITH HYPERGLYCEMIA: ICD-10-CM

## 2019-08-02 DIAGNOSIS — Z95.1 PRESENCE OF AORTOCORONARY BYPASS GRAFT: ICD-10-CM

## 2019-08-02 LAB
ANION GAP SERPL CALC-SCNC: 15 MMOL/L — SIGNIFICANT CHANGE UP (ref 5–17)
BUN SERPL-MCNC: 9 MG/DL — SIGNIFICANT CHANGE UP (ref 7–23)
CALCIUM SERPL-MCNC: 9.2 MG/DL — SIGNIFICANT CHANGE UP (ref 8.4–10.5)
CHLORIDE SERPL-SCNC: 99 MMOL/L — SIGNIFICANT CHANGE UP (ref 96–108)
CO2 SERPL-SCNC: 24 MMOL/L — SIGNIFICANT CHANGE UP (ref 22–31)
CREAT SERPL-MCNC: 0.52 MG/DL — SIGNIFICANT CHANGE UP (ref 0.5–1.3)
CULTURE RESULTS: SIGNIFICANT CHANGE UP
GLUCOSE BLDC GLUCOMTR-MCNC: 161 MG/DL — HIGH (ref 70–99)
GLUCOSE BLDC GLUCOMTR-MCNC: 172 MG/DL — HIGH (ref 70–99)
GLUCOSE SERPL-MCNC: 148 MG/DL — HIGH (ref 70–99)
HCT VFR BLD CALC: 27.8 % — LOW (ref 34.5–45)
HGB BLD-MCNC: 9.2 G/DL — LOW (ref 11.5–15.5)
MCHC RBC-ENTMCNC: 30.8 PG — SIGNIFICANT CHANGE UP (ref 27–34)
MCHC RBC-ENTMCNC: 33.2 GM/DL — SIGNIFICANT CHANGE UP (ref 32–36)
MCV RBC AUTO: 92.8 FL — SIGNIFICANT CHANGE UP (ref 80–100)
PLATELET # BLD AUTO: 304 K/UL — SIGNIFICANT CHANGE UP (ref 150–400)
POTASSIUM SERPL-MCNC: 4 MMOL/L — SIGNIFICANT CHANGE UP (ref 3.5–5.3)
POTASSIUM SERPL-SCNC: 4 MMOL/L — SIGNIFICANT CHANGE UP (ref 3.5–5.3)
RBC # BLD: 3 M/UL — LOW (ref 3.8–5.2)
RBC # FLD: 13.5 % — SIGNIFICANT CHANGE UP (ref 10.3–14.5)
SODIUM SERPL-SCNC: 138 MMOL/L — SIGNIFICANT CHANGE UP (ref 135–145)
SPECIMEN SOURCE: SIGNIFICANT CHANGE UP
WBC # BLD: 11.4 K/UL — HIGH (ref 3.8–10.5)
WBC # FLD AUTO: 11.4 K/UL — HIGH (ref 3.8–10.5)

## 2019-08-02 PROCEDURE — 85730 THROMBOPLASTIN TIME PARTIAL: CPT

## 2019-08-02 PROCEDURE — 86850 RBC ANTIBODY SCREEN: CPT

## 2019-08-02 PROCEDURE — 82550 ASSAY OF CK (CPK): CPT

## 2019-08-02 PROCEDURE — 85610 PROTHROMBIN TIME: CPT

## 2019-08-02 PROCEDURE — C1889: CPT

## 2019-08-02 PROCEDURE — 80053 COMPREHEN METABOLIC PANEL: CPT

## 2019-08-02 PROCEDURE — 82947 ASSAY GLUCOSE BLOOD QUANT: CPT

## 2019-08-02 PROCEDURE — 86901 BLOOD TYPING SEROLOGIC RH(D): CPT

## 2019-08-02 PROCEDURE — P9045: CPT

## 2019-08-02 PROCEDURE — 94002 VENT MGMT INPAT INIT DAY: CPT

## 2019-08-02 PROCEDURE — 82435 ASSAY OF BLOOD CHLORIDE: CPT

## 2019-08-02 PROCEDURE — 84100 ASSAY OF PHOSPHORUS: CPT

## 2019-08-02 PROCEDURE — 97161 PT EVAL LOW COMPLEX 20 MIN: CPT

## 2019-08-02 PROCEDURE — 84484 ASSAY OF TROPONIN QUANT: CPT

## 2019-08-02 PROCEDURE — C1769: CPT

## 2019-08-02 PROCEDURE — 83605 ASSAY OF LACTIC ACID: CPT

## 2019-08-02 PROCEDURE — 97166 OT EVAL MOD COMPLEX 45 MIN: CPT

## 2019-08-02 PROCEDURE — 82553 CREATINE MB FRACTION: CPT

## 2019-08-02 PROCEDURE — 82565 ASSAY OF CREATININE: CPT

## 2019-08-02 PROCEDURE — 82330 ASSAY OF CALCIUM: CPT

## 2019-08-02 PROCEDURE — 82803 BLOOD GASES ANY COMBINATION: CPT

## 2019-08-02 PROCEDURE — 86803 HEPATITIS C AB TEST: CPT

## 2019-08-02 PROCEDURE — 85014 HEMATOCRIT: CPT

## 2019-08-02 PROCEDURE — 83880 ASSAY OF NATRIURETIC PEPTIDE: CPT

## 2019-08-02 PROCEDURE — 86900 BLOOD TYPING SEROLOGIC ABO: CPT

## 2019-08-02 PROCEDURE — 93005 ELECTROCARDIOGRAM TRACING: CPT

## 2019-08-02 PROCEDURE — P9016: CPT

## 2019-08-02 PROCEDURE — 36430 TRANSFUSION BLD/BLD COMPNT: CPT

## 2019-08-02 PROCEDURE — 83735 ASSAY OF MAGNESIUM: CPT

## 2019-08-02 PROCEDURE — 81001 URINALYSIS AUTO W/SCOPE: CPT

## 2019-08-02 PROCEDURE — 81003 URINALYSIS AUTO W/O SCOPE: CPT

## 2019-08-02 PROCEDURE — 97116 GAIT TRAINING THERAPY: CPT

## 2019-08-02 PROCEDURE — 86891 AUTOLOGOUS BLOOD OP SALVAGE: CPT

## 2019-08-02 PROCEDURE — 86923 COMPATIBILITY TEST ELECTRIC: CPT

## 2019-08-02 PROCEDURE — 85027 COMPLETE CBC AUTOMATED: CPT

## 2019-08-02 PROCEDURE — 31720 CLEARANCE OF AIRWAYS: CPT

## 2019-08-02 PROCEDURE — 71045 X-RAY EXAM CHEST 1 VIEW: CPT

## 2019-08-02 PROCEDURE — 85384 FIBRINOGEN ACTIVITY: CPT

## 2019-08-02 PROCEDURE — 82962 GLUCOSE BLOOD TEST: CPT

## 2019-08-02 PROCEDURE — 84295 ASSAY OF SERUM SODIUM: CPT

## 2019-08-02 PROCEDURE — 84132 ASSAY OF SERUM POTASSIUM: CPT

## 2019-08-02 PROCEDURE — 80048 BASIC METABOLIC PNL TOTAL CA: CPT

## 2019-08-02 PROCEDURE — 87086 URINE CULTURE/COLONY COUNT: CPT

## 2019-08-02 PROCEDURE — 85576 BLOOD PLATELET AGGREGATION: CPT

## 2019-08-02 PROCEDURE — 97530 THERAPEUTIC ACTIVITIES: CPT

## 2019-08-02 PROCEDURE — P9047: CPT

## 2019-08-02 RX ORDER — FUROSEMIDE 40 MG
1 TABLET ORAL
Qty: 0 | Refills: 0 | DISCHARGE
Start: 2019-08-02

## 2019-08-02 RX ORDER — ASPIRIN/CALCIUM CARB/MAGNESIUM 324 MG
81 TABLET ORAL DAILY
Refills: 0 | Status: DISCONTINUED | OUTPATIENT
Start: 2019-08-02 | End: 2019-08-17

## 2019-08-02 RX ORDER — ATORVASTATIN CALCIUM 80 MG/1
1 TABLET, FILM COATED ORAL
Qty: 0 | Refills: 0 | DISCHARGE

## 2019-08-02 RX ORDER — DOCUSATE SODIUM 100 MG
1 CAPSULE ORAL
Qty: 0 | Refills: 0 | DISCHARGE
Start: 2019-08-02

## 2019-08-02 RX ORDER — INSULIN LISPRO 100/ML
VIAL (ML) SUBCUTANEOUS
Refills: 0 | Status: DISCONTINUED | OUTPATIENT
Start: 2019-08-02 | End: 2019-08-17

## 2019-08-02 RX ORDER — OXYCODONE AND ACETAMINOPHEN 5; 325 MG/1; MG/1
1 TABLET ORAL EVERY 6 HOURS
Refills: 0 | Status: DISCONTINUED | OUTPATIENT
Start: 2019-08-02 | End: 2019-08-08

## 2019-08-02 RX ORDER — GABAPENTIN 400 MG/1
1 CAPSULE ORAL
Qty: 0 | Refills: 0 | DISCHARGE

## 2019-08-02 RX ORDER — METOPROLOL TARTRATE 50 MG
25 TABLET ORAL
Refills: 0 | Status: DISCONTINUED | OUTPATIENT
Start: 2019-08-02 | End: 2019-08-05

## 2019-08-02 RX ORDER — DEXTROSE 50 % IN WATER 50 %
25 SYRINGE (ML) INTRAVENOUS ONCE
Refills: 0 | Status: DISCONTINUED | OUTPATIENT
Start: 2019-08-02 | End: 2019-08-17

## 2019-08-02 RX ORDER — DEXTROSE 50 % IN WATER 50 %
12.5 SYRINGE (ML) INTRAVENOUS ONCE
Refills: 0 | Status: DISCONTINUED | OUTPATIENT
Start: 2019-08-02 | End: 2019-08-17

## 2019-08-02 RX ORDER — SPIRONOLACTONE 25 MG/1
25 TABLET, FILM COATED ORAL DAILY
Refills: 0 | Status: DISCONTINUED | OUTPATIENT
Start: 2019-08-02 | End: 2019-08-03

## 2019-08-02 RX ORDER — ACETAMINOPHEN 500 MG
650 TABLET ORAL EVERY 6 HOURS
Refills: 0 | Status: DISCONTINUED | OUTPATIENT
Start: 2019-08-02 | End: 2019-08-12

## 2019-08-02 RX ORDER — OXYCODONE AND ACETAMINOPHEN 5; 325 MG/1; MG/1
2 TABLET ORAL EVERY 6 HOURS
Refills: 0 | Status: DISCONTINUED | OUTPATIENT
Start: 2019-08-02 | End: 2019-08-08

## 2019-08-02 RX ORDER — GLUCAGON INJECTION, SOLUTION 0.5 MG/.1ML
1 INJECTION, SOLUTION SUBCUTANEOUS ONCE
Refills: 0 | Status: DISCONTINUED | OUTPATIENT
Start: 2019-08-02 | End: 2019-08-17

## 2019-08-02 RX ORDER — SITAGLIPTIN 50 MG/1
1 TABLET, FILM COATED ORAL
Qty: 0 | Refills: 0 | DISCHARGE

## 2019-08-02 RX ORDER — METFORMIN HYDROCHLORIDE 850 MG/1
1000 TABLET ORAL
Refills: 0 | Status: DISCONTINUED | OUTPATIENT
Start: 2019-08-02 | End: 2019-08-17

## 2019-08-02 RX ORDER — FAMOTIDINE 10 MG/ML
20 INJECTION INTRAVENOUS
Refills: 0 | Status: DISCONTINUED | OUTPATIENT
Start: 2019-08-02 | End: 2019-08-05

## 2019-08-02 RX ORDER — ATORVASTATIN CALCIUM 80 MG/1
40 TABLET, FILM COATED ORAL AT BEDTIME
Refills: 0 | Status: DISCONTINUED | OUTPATIENT
Start: 2019-08-02 | End: 2019-08-17

## 2019-08-02 RX ORDER — ATORVASTATIN CALCIUM 80 MG/1
1 TABLET, FILM COATED ORAL
Qty: 0 | Refills: 0 | DISCHARGE
Start: 2019-08-02

## 2019-08-02 RX ORDER — DOCUSATE SODIUM 100 MG
100 CAPSULE ORAL
Refills: 0 | Status: DISCONTINUED | OUTPATIENT
Start: 2019-08-02 | End: 2019-08-03

## 2019-08-02 RX ORDER — METOPROLOL TARTRATE 50 MG
1 TABLET ORAL
Qty: 0 | Refills: 0 | DISCHARGE
Start: 2019-08-02

## 2019-08-02 RX ORDER — POLYETHYLENE GLYCOL 3350 17 G/17G
17 POWDER, FOR SOLUTION ORAL
Qty: 0 | Refills: 0 | DISCHARGE
Start: 2019-08-02

## 2019-08-02 RX ORDER — INSULIN LISPRO 100/ML
VIAL (ML) SUBCUTANEOUS AT BEDTIME
Refills: 0 | Status: DISCONTINUED | OUTPATIENT
Start: 2019-08-02 | End: 2019-08-17

## 2019-08-02 RX ORDER — GLIMEPIRIDE 1 MG
1 TABLET ORAL
Qty: 0 | Refills: 0 | DISCHARGE

## 2019-08-02 RX ORDER — SENNA PLUS 8.6 MG/1
2 TABLET ORAL AT BEDTIME
Refills: 0 | Status: DISCONTINUED | OUTPATIENT
Start: 2019-08-02 | End: 2019-08-17

## 2019-08-02 RX ORDER — HEPARIN SODIUM 5000 [USP'U]/ML
5000 INJECTION INTRAVENOUS; SUBCUTANEOUS EVERY 8 HOURS
Refills: 0 | Status: DISCONTINUED | OUTPATIENT
Start: 2019-08-02 | End: 2019-08-17

## 2019-08-02 RX ORDER — HEPARIN SODIUM 5000 [USP'U]/ML
5000 INJECTION INTRAVENOUS; SUBCUTANEOUS
Qty: 0 | Refills: 0 | DISCHARGE
Start: 2019-08-02

## 2019-08-02 RX ORDER — FUROSEMIDE 40 MG
40 TABLET ORAL DAILY
Refills: 0 | Status: DISCONTINUED | OUTPATIENT
Start: 2019-08-02 | End: 2019-08-03

## 2019-08-02 RX ORDER — INSULIN LISPRO 100/ML
2 VIAL (ML) SUBCUTANEOUS
Refills: 0 | Status: DISCONTINUED | OUTPATIENT
Start: 2019-08-02 | End: 2019-08-03

## 2019-08-02 RX ORDER — POLYETHYLENE GLYCOL 3350 17 G/17G
17 POWDER, FOR SOLUTION ORAL DAILY
Refills: 0 | Status: DISCONTINUED | OUTPATIENT
Start: 2019-08-02 | End: 2019-08-03

## 2019-08-02 RX ORDER — DEXTROSE 50 % IN WATER 50 %
15 SYRINGE (ML) INTRAVENOUS ONCE
Refills: 0 | Status: DISCONTINUED | OUTPATIENT
Start: 2019-08-02 | End: 2019-08-17

## 2019-08-02 RX ORDER — FAMOTIDINE 10 MG/ML
1 INJECTION INTRAVENOUS
Qty: 0 | Refills: 0 | DISCHARGE
Start: 2019-08-02

## 2019-08-02 RX ORDER — SODIUM CHLORIDE 9 MG/ML
1000 INJECTION, SOLUTION INTRAVENOUS
Refills: 0 | Status: DISCONTINUED | OUTPATIENT
Start: 2019-08-02 | End: 2019-08-17

## 2019-08-02 RX ORDER — SPIRONOLACTONE 25 MG/1
1 TABLET, FILM COATED ORAL
Qty: 0 | Refills: 0 | DISCHARGE
Start: 2019-08-02

## 2019-08-02 RX ORDER — ESCITALOPRAM OXALATE 10 MG/1
10 TABLET, FILM COATED ORAL DAILY
Refills: 0 | Status: DISCONTINUED | OUTPATIENT
Start: 2019-08-02 | End: 2019-08-17

## 2019-08-02 RX ADMIN — Medication 2 UNIT(S): at 07:57

## 2019-08-02 RX ADMIN — HEPARIN SODIUM 5000 UNIT(S): 5000 INJECTION INTRAVENOUS; SUBCUTANEOUS at 20:21

## 2019-08-02 RX ADMIN — CHLORHEXIDINE GLUCONATE 1 APPLICATION(S): 213 SOLUTION TOPICAL at 07:57

## 2019-08-02 RX ADMIN — HEPARIN SODIUM 5000 UNIT(S): 5000 INJECTION INTRAVENOUS; SUBCUTANEOUS at 06:23

## 2019-08-02 RX ADMIN — SPIRONOLACTONE 25 MILLIGRAM(S): 25 TABLET, FILM COATED ORAL at 06:23

## 2019-08-02 RX ADMIN — Medication 25 MILLIGRAM(S): at 06:23

## 2019-08-02 RX ADMIN — METFORMIN HYDROCHLORIDE 1000 MILLIGRAM(S): 850 TABLET ORAL at 07:58

## 2019-08-02 RX ADMIN — Medication 40 MILLIGRAM(S): at 06:23

## 2019-08-02 RX ADMIN — OXYCODONE AND ACETAMINOPHEN 1 TABLET(S): 5; 325 TABLET ORAL at 06:23

## 2019-08-02 RX ADMIN — Medication 81 MILLIGRAM(S): at 11:24

## 2019-08-02 RX ADMIN — SODIUM CHLORIDE 3 MILLILITER(S): 9 INJECTION INTRAMUSCULAR; INTRAVENOUS; SUBCUTANEOUS at 06:28

## 2019-08-02 RX ADMIN — FAMOTIDINE 20 MILLIGRAM(S): 10 INJECTION INTRAVENOUS at 06:22

## 2019-08-02 RX ADMIN — HEPARIN SODIUM 5000 UNIT(S): 5000 INJECTION INTRAVENOUS; SUBCUTANEOUS at 13:09

## 2019-08-02 RX ADMIN — Medication 6: at 07:57

## 2019-08-02 RX ADMIN — Medication 100 MILLIGRAM(S): at 06:22

## 2019-08-02 RX ADMIN — ESCITALOPRAM OXALATE 10 MILLIGRAM(S): 10 TABLET, FILM COATED ORAL at 11:24

## 2019-08-02 RX ADMIN — Medication 2 UNIT(S): at 13:07

## 2019-08-02 RX ADMIN — OXYCODONE AND ACETAMINOPHEN 1 TABLET(S): 5; 325 TABLET ORAL at 20:15

## 2019-08-02 RX ADMIN — ATORVASTATIN CALCIUM 40 MILLIGRAM(S): 80 TABLET, FILM COATED ORAL at 20:19

## 2019-08-02 RX ADMIN — Medication 6: at 13:07

## 2019-08-02 NOTE — H&P ADULT - REASON FOR ADMISSION
gait instability, ADL impairments and functional impairments 2/2 CABG 07/27 gait instability, ADL impairments and functional impairments s/p CABG 07/27/19

## 2019-08-02 NOTE — PROGRESS NOTE ADULT - PROVIDER SPECIALTY LIST ADULT
CT Surgery
Cardiology
Critical Care
Cardiology
CT Surgery

## 2019-08-02 NOTE — DISCHARGE NOTE NURSING/CASE MANAGEMENT/SOCIAL WORK - NSDCDPATPORTLINK_GEN_ALL_CORE
You can access the [facility] Portal, offered by [facility], by registering with the following website: [email or web address]

## 2019-08-02 NOTE — PATIENT PROFILE ADULT - HEALTH LITERACY UNDERSTANDING DOCTOR- DETAILS
55F, history of HTN, Dyslipidemia, MVP, SVT s/p ablation in 2001 experiencing, intermittent, exertional, palpitations, HA, rhinorrhea, sore throat, fever to 101*, generalized body aches and chills. The palpations continued to worsen on 11/23, which prompted her to come to the ER. Denies any visual changes, neck pain, cough, SOB, abdominal pain, nausea, vomit, diarrhea, dysuria. Denies any recent travel, immobilization, OCP use, or history of DVT/PE.  In the Ed, received Tylenol 650mg po x 1 and NS x 1L. Current vitals T= 98.9 oral, BP = 121/ 68, HR= 90b/ min.
Son explains information

## 2019-08-02 NOTE — H&P ADULT - HISTORY OF PRESENT ILLNESS
This patient is a 59 year old Luxembourgish and English speaking female with PMH of HTN, HLD, DM2 who presented to [facility] ED 7/25 with near syncope after arguing with her daughter. Patient also had acute onset nausea, diaphoresis, palpitations, dizziness, and mild chest pressure. NO chest pain, SOB, AGARWAL, PND, orthopnea, increased lower extremity edema, fever/chills, cough. The patient had cath at [facility] which showed 95% left main and 95% RCA lesions and she was transferred to [facility] for CABG on 7/27 with Dr. Ramey.     Hospital course This patient is a 59 year old Arabic and English speaking female with PMH of HTN, HLD, DM2 who presented to [facility] ED 7/25 with near syncope after arguing with her daughter. Patient also had acute onset nausea, diaphoresis, palpitations, dizziness, and mild chest pressure. NO chest pain, SOB, AGARWAL, PND, orthopnea, increased lower extremity edema, fever/chills, cough. The patient had cath at [facility] which showed 95% left main and 95% RCA lesions and she was transferred to [facility] for CABG on 7/27 with Dr. Ramey. Hospital course sig. for chest tube (DC'd 7/30), RLE ghassan (DC'd 7/30), small R thigh hematoma (8/1) and afebrile leukocytosis to 11 (8/1). Pt is a 59 year old Albanian and English speaking female with PMH of HTN, HLD, DM2 who presented to [facility] ED 7/25 with near syncope after arguing with her daughter. Patient also had acute onset nausea, diaphoresis, palpitations, dizziness, and mild chest pressure. NO chest pain, SOB, AGARWAL, PND, orthopnea, increased lower extremity edema, fever/chills, cough. The patient had cath at [facility] which showed 95% left main and 95% RCA lesions and she was transferred to [facility] for CABG on 7/27 with Dr. Ramey. Hospital course sig. for chest tube placement (DC'd 7/30), RLE ghassan (DC'd 7/30), small R thigh hematoma (8/1) and afebrile leukocytosis to 11 (8/1). Pt is a 59 year old Serbian and English speaking female with PMH of HTN, HLD, DM2 who presented to [facility] ED 7/25 with near syncope after arguing with her daughter. Patient also had acute onset nausea, diaphoresis, palpitations, dizziness, and mild chest pressure. NO chest pain, SOB, AGARWAL, PND, orthopnea, increased lower extremity edema, fever/chills, cough. The patient had cath at [facility] which showed 95% left main and 95% RCA lesions and she was transferred to [facility] for CABG on 7/27 with Dr. Ramey. Hospital course sig. for chest tube placement (DC'd 7/30), RLE ghassan (DC'd 7/30), small R thigh hematoma (8/1) and afebrile leukocytosis to 11 (8/1). Admitted to [facility] rehab for gait instability, ADL impairments and functional impairments s/p CABG.

## 2019-08-02 NOTE — H&P ADULT - NSHPSOCIALHISTORY_GEN_ALL_CORE
SOCIAL HISTORY  Smoking - Denied, EtOH - Denied, Drugs - Denied    FUNCTIONAL HISTORY:   Lives with spouse in first floor apartment with 6 [address]. Has grab bar and shower chair in tub shower.   Independent with all ADLs and IADLs PTA      CURRENT FUNCTIONAL STATUS:   CGA/min a for transfers with rolling walker, WBAT  Min-assist ambulates 5 feet with rolling walker, chair-follow. Limitations 2/2 impaired gait balance and pain. Full weight-bearing.    FAMILY HISTORY   Family history of cancer  Family history of diabetes mellitus (DM)  No pertinent family history in first degree relatives

## 2019-08-02 NOTE — H&P ADULT - NSHPREVIEWOFSYSTEMS_GEN_ALL_CORE
REVIEW OF SYSTEMS  Constitutional: No fever, No Chills, No fatigue  HEENT: No eye pain, No visual disturbances, No difficulty hearing  Pulm: No cough,  No shortness of breath  Cardio: No chest pain, No palpitations  GI:  No abdominal pain, No nausea, No vomiting, No diarrhea, No constipation  : No dysuria, No frequency, No hematuria  Neuro: No headaches, No memory loss, No loss of strength, No numbness, No tremors  Skin: No itching, No rashes, No lesions   Endo: No temperature intolerance  MSK: No joint pain, No joint swelling, No muscle pain, No Neck or back pain  Psych:  No depression, No anxiety REVIEW OF SYSTEMS  Constitutional: No fever, +generalized fatigue  HEENT: No eye pain, No visual disturbances, No difficulty hearing  Pulm: Occasional cough,  No shortness of breath  Cardio: +chest pain around the incision, stable from OSH, No palpitations  GI:  No abdominal pain, No nausea, No vomiting, No diarrhea, No constipation, Last BM today  : No dysuria, No frequency, No hematuria  Neuro: +headaches, 5/10, stable from OSH, No memory loss, +generalized weakness, No numbness, No tremors  Skin: +surgical incision   Endo: No temperature intolerance  MSK: No joint pain, +LE edema, +generalized muscle aches  Psych:  No depression, No anxiety

## 2019-08-02 NOTE — PROGRESS NOTE ADULT - ATTENDING COMMENTS
CARDIOLOGY ATTENDING    Patient seen and examined. Agree with above. No further inpatient cardiac workup needed. D/C planning as per CTS. F/U with Dr. Juárez on Friday 9/6 at 9:45 AM

## 2019-08-02 NOTE — PROGRESS NOTE ADULT - SUBJECTIVE AND OBJECTIVE BOX
S: Feels better today. Pain controlled. Denies SOB. Review of systems otherwise (-)        MEDICATIONS  (STANDING):  aspirin  chewable 81 milliGRAM(s) Oral daily  atorvastatin 40 milliGRAM(s) Oral at bedtime  chlorhexidine 2% Cloths 1 Application(s) Topical <User Schedule>  dextrose 50% Injectable 50 milliLiter(s) IV Push every 15 minutes  dextrose 50% Injectable 25 milliLiter(s) IV Push every 15 minutes  docusate sodium 100 milliGRAM(s) Oral three times a day  escitalopram 10 milliGRAM(s) Oral daily  famotidine    Tablet 20 milliGRAM(s) Oral two times a day  furosemide    Tablet 40 milliGRAM(s) Oral daily  heparin  Injectable 5000 Unit(s) SubCutaneous every 8 hours  insulin lispro (HumaLOG) corrective regimen sliding scale   SubCutaneous at bedtime  insulin lispro (HumaLOG) corrective regimen sliding scale   SubCutaneous three times a day before meals  insulin lispro Injectable (HumaLOG) 2 Unit(s) SubCutaneous before breakfast  insulin lispro Injectable (HumaLOG) 2 Unit(s) SubCutaneous before lunch  insulin lispro Injectable (HumaLOG) 2 Unit(s) SubCutaneous before dinner  metFORMIN 1000 milliGRAM(s) Oral two times a day  metoprolol tartrate 25 milliGRAM(s) Oral two times a day  polyethylene glycol 3350 17 Gram(s) Oral daily  sodium chloride 0.9% lock flush 3 milliLiter(s) IV Push every 8 hours  spironolactone 25 milliGRAM(s) Oral daily    MEDICATIONS  (PRN):  oxyCODONE    5 mG/acetaminophen 325 mG 1 Tablet(s) Oral every 6 hours PRN Moderate Pain (4 - 6)  oxyCODONE    5 mG/acetaminophen 325 mG 2 Tablet(s) Oral every 6 hours PRN Severe Pain (7 - 10)      LABS:                            9.2    11.4  )-----------( 304      ( 02 Aug 2019 06:51 )             27.8     Hemoglobin: 9.2 g/dL (08-02 @ 06:51)  Hemoglobin: 9.8 g/dL (08-01 @ 07:06)  Hemoglobin: 8.7 g/dL (07-31 @ 06:06)  Hemoglobin: 8.3 g/dL (07-30 @ 06:54)  Hemoglobin: 9.0 g/dL (07-29 @ 00:30)    08-02    138  |  99  |  9   ----------------------------<  148<H>  4.0   |  24  |  0.52    Ca    9.2      02 Aug 2019 06:51    TPro  6.7  /  Alb  4.1  /  TBili  0.7  /  DBili  x   /  AST  22  /  ALT  29  /  AlkPhos  55  08-01    Creatinine Trend: 0.52<--, 0.52<--, 0.50<--, 0.48<--, 0.42<--, 0.56<--           PHYSICAL EXAM  Vital Signs Last 24 Hrs  T(C): 36.9 (02 Aug 2019 05:29), Max: 36.9 (02 Aug 2019 05:29)  T(F): 98.4 (02 Aug 2019 05:29), Max: 98.4 (02 Aug 2019 05:29)  HR: 76 (02 Aug 2019 05:29) (72 - 92)  BP: 110/68 (02 Aug 2019 05:29) (105/65 - 110/68)  BP(mean): --  RR: 18 (02 Aug 2019 05:29) (18 - 18)  SpO2: 96% (02 Aug 2019 05:29) (96% - 98%)        Gen: Appears well in NAD  CV: N S1 S2 1/6 AVILA (+)2 Pulses B/l  Resp:  Clear to auscultation B/L, normal effort  GI: (+) BS Soft, NT, ND  Lymph:  (-)Edema, (-)obvious lymphadenopathy  Skin: Warm to touch, Normal turgor  Psych: Appropriate mood and affect      TELEMETRY: SR 70-80, brief PAT    DATA:    < from: Transthoracic Echocardiogram (07.25.19 @ 17:31) >  CONCLUSIONS:  1. Mitral annular calcification, otherwise normal mitral  valve. No mitral regurgitation seen.  2. Increased relative wall thicknesswith normal left  ventricular mass index, consistent with concentric left  ventricular remodeling.  3. Normal left ventricular systolic function. No segmental  wall motion abnormalities.  4. Normal left ventricular diastolic function.  5. Normal right ventricular size and function.  *** Compared with echocardiogram of 6/2/2015, no  significant changes noted.  ------------------------------------------------------------------------  Confirmed on  7/26/2019 - 07:51:14 by Leo Bailey M.D.  ------------------------------------------------------------------------    < end of copied text >      < from: Nuclear Stress Test-Pharmacologic (07.26.19 @ 10:45) >  IMPRESSIONS:Abnormal Study  * Myocardial Perfusion SPECT results are abnormal.  * There is a medium sized, moderate to severe defect in  lateral wall.  * Post-stress gated wall motion analysis was performed  (LVEF > 70%;LVEDV = 37 ml.), revealing overall normal LV  function with focal diminished systolic thickening in the  lateral wall.  Based on the EKG and nuclear findings, rest imaging was  not performed, cardiac catheterization was recommended.  ------------------------------------------------------------------------  Confirmed on  7/26/2019 - 13:50:42 by Judah Garcia M.D.    < end of copied text >  ASSESSMENT/PLAN: 	    60 yo F HTN, chol, DM with pre- syncope. Cardiology following for syncope, r/o ACS.   transferred to [place] for CABG, received IABP , S/P CABG.    - Continue medical management of CAD - ASA/Statin/BB  - Pain control  - Supportive Care per CTS  - D/C planning ROXANNA today per primary team  - Patient to follow up in our [place] office ([address], [id number]) with Dr. Juárez on Friday 9/6 at 9:45 AM (Office #[phone])    Yunior Fish PA-C  [facility]  Pager: [phone]

## 2019-08-02 NOTE — H&P ADULT - NSHPPHYSICALEXAM_GEN_ALL_CORE
Physical Exam  T(C): 36.9 (08-02-19 @ 05:29), Max: 36.9 (08-02-19 @ 05:29)  HR: 76 (08-02-19 @ 05:29) (72 - 92)  BP: 110/68 (08-02-19 @ 05:29) (105/65 - 110/68)  RR: 18 (08-02-19 @ 05:29) (18 - 18)  SpO2: 96% (08-02-19 @ 05:29) (96% - 98%)    Constitutional - NAD, Comfortable  HEENT - NCAT, EOMI  Neck - Supple  Chest - CTA bilaterally  Cardiovascular - RRR, S1S2  Abdomen - BS+, Soft, NTND  Extremities - No C/C/E, No calf tenderness   Neurologic Exam -                    Cognitive - Awake, Alert, Oriented to self, place, date, year, situation     Communication - Fluent, No dysarthria     Attention - able to recite months of the year backward     Naming/Abstract -      Memory - able to recall 3/3 items immediate and -/3 after 3 minutes     Cranial Nerves - CN 2-12 grossly intact     Motor -                     LEFT    UE - ShAB 5/5, EF 5/5, EE 5/5, WE 5/5,  5/5                    RIGHT UE - ShAB 5/5, EF 5/5, EE 5/5, WE 5/5,  5/5                    LEFT    LE - HF 5/5, KE 5/5, DF 5/5, PF 5/5                    RIGHT LE - HF 5/5, KE 5/5, DF 5/5, PF 5/5        Sensory - Intact to light touch diffusely     Vestibulo-ocular - No nystagmus, no saccades     Reflexes - DTR intact and symmetrical, Negative Bowie's bilaterally, Negative Babinski's bilaterally      Coordination - Finger-to-nose intact bilaterally   Psychiatric - Affect WNL  Skin - Physical Exam  Vital Signs Last 24 Hrs  T(C): 36.9 (02 Aug 2019 05:29), Max: 36.9 (02 Aug 2019 05:29)  T(F): 98.4 (02 Aug 2019 05:29), Max: 98.4 (02 Aug 2019 05:29)  HR: 76 (02 Aug 2019 05:29) (72 - 92)  BP: 110/68 (02 Aug 2019 05:29) (105/65 - 110/68)  BP(mean): --  RR: 18 (02 Aug 2019 05:29) (18 - 18)  SpO2: 96% (02 Aug 2019 05:29) (96% - 98%) Physical Exam  T(C): 37 (08-02-19 @ 14:37), Max: 37 (08-02-19 @ 14:37)  HR: 78 (08-02-19 @ 14:37) (72 - 78)  BP: 126/77 (08-02-19 @ 14:37) (105/65 - 126/77)  RR: 18 (08-02-19 @ 14:37) (18 - 18)  SpO2: 100% (08-02-19 @ 14:37) (96% - 100%)    Constitutional - NAD, Comfortable  HEENT - NCAT  Chest - CTA bilaterally, no increased work of breathing   Cardiovascular - RRR, S1S2, systolic murmur  Abdomen - BS+, Soft, NTND  Extremities - +BL LE edema. Right leg incision, c/d/i, with surrounding ecchymosis, edema and erythema, mildly TTP, no drainage  Neurologic Exam -                    Cognitive - Awake, Alert, Oriented to self, place, date, year, situation     Communication - Fluent, No dysarthria     Cranial Nerves - CN 2-12 grossly intact     Motor - Limited due to sternal incision pain                     LEFT    UE - ShAB 4/5, EF 5/5, EE 5/5, WE 5/5,  5/5                    RIGHT UE - ShAB 4/5, EF 5/5, EE 5/5, WE 5/5,  5/5                    LEFT    LE - HF 4/5, KE 5/5, DF 5/5, PF 5/5                    RIGHT LE - HF 4/5, KE 5/5, DF 5/5, PF 5/5        Sensory - Intact to light touch diffusely     Reflexes - DTR intact and symmetrical  Psychiatric - Affect WNL  Skin - chest incision, c/d/i with surrounding ecchymosis and erythema, sutures in distal portion of the incision

## 2019-08-02 NOTE — H&P ADULT - ATTENDING COMMENTS
Seen and examined, Agree with Dr. Pompa note.   Patient with debility, gait dysfunction S/P CABG admitted for comprehensive inpatient rehabilitation.

## 2019-08-02 NOTE — H&P ADULT - NSHPLABSRESULTS_GEN_ALL_CORE
9.2    11.4  )-----------( 304      ( 02 Aug 2019 06:51 )             27.8     08-    138  |  99  |  9   ----------------------------<  148<H>  4.0   |  24  |  0.52    Ca    9.2      02 Aug 2019 06:51    TPro  6.7  /  Alb  4.1  /  TBili  0.7  /  DBili  x   /  AST  22  /  ALT  29  /  AlkPhos  55  08-01      Urinalysis Basic - ( 01 Aug 2019 16:53 )    Color: Light Yellow / Appearance: Clear / S.012 / pH: x  Gluc: x / Ketone: Negative  / Bili: Negative / Urobili: Negative   Blood: x / Protein: Negative / Nitrite: Negative   Leuk Esterase: Negative / RBC: x / WBC x   Sq Epi: x / Non Sq Epi: x / Bacteria: x 9.2    11.4  )-----------( 304      ( 02 Aug 2019 06:51 )             27.8     Complete Blood Count in AM (19 @ 07:06)    WBC Count: 11.3 K/uL    RBC Count: 3.10 M/uL    Hemoglobin: 9.8 g/dL    Hematocrit: 28.3 %    Mean Cell Volume: 91.5 fl    Mean Cell Hemoglobin: 31.7 pg    Mean Cell Hemoglobin Conc: 34.6 gm/dL    Red Cell Distrib Width: 12.9 %    Platelet Count - Automated: 281 K/uL        138  |  99  |  9   ----------------------------<  148<H>  4.0   |  24  |  0.52    Ca    9.2      02 Aug 2019 06:51    TPro  6.7  /  Alb  4.1  /  TBili  0.7  /  DBili  x   /  AST  22  /  ALT  29  /  AlkPhos  55  08      Urinalysis Basic - ( 01 Aug 2019 16:53 )    Color: Light Yellow / Appearance: Clear / S.012 / pH: x  Gluc: x / Ketone: Negative  / Bili: Negative / Urobili: Negative   Blood: x / Protein: Negative / Nitrite: Negative   Leuk Esterase: Negative / RBC: x / WBC x   Sq Epi: x / Non Sq Epi: x / Bacteria: x    CAPILLARY BLOOD GLUCOSE  POCT Blood Glucose.: 161 mg/dL (02 Aug 2019 07:54)  POCT Blood Glucose.: 106 mg/dL (01 Aug 2019 21:59)  POCT Blood Glucose.: 130 mg/dL (01 Aug 2019 16:55)    RADIOLOGY/IMAGING/OTHER TESTING  < from: 12 Lead ECG (19 @ 01:45) >  Ventricular Rate 87 BPM  Atrial Rate 87 BPM  P-R Interval 118 ms  QRS Duration 76 ms  Q-T Interval 376 ms  QTC Calculation(Bezet) 452 ms  P Axis 62 degrees  R Axis 10 degrees  T Axis 89 degrees  Diagnosis Line NORMAL SINUS RHYTHM  NONSPECIFIC T WAVE ABNORMALITY    < from: Intra-Operative Transesophageal Echo (19 @ 11:52) >    Conclusions:  1. Normal mitral valve. Mild mitral regurgitation.  2. Estimated aortic valve area equals 1.5 sqcm by  planimetry. Minimal aortic regurgitation.  3. Aortic Annulus: 1.8 cm.  Aortic Root: 2.7 cm.  Sinotubular Junction: 2 cm.  Ascending Aorta: 2.6 cm.  4. Normal left atrium.  5. Normal left ventricular internal dimensions and wall  thicknesses.  6. Normal left ventricular systolic function. No segmental  wall motion abnormalities. No regional wall motion  abnormalities.  7. Normal right atrium.  8. Normal right ventricular size and function.  9. Normal tricuspid valve. Minimal tricuspid regurgitation.  10. Color Doppler demonstrates no evidence of a patent  foramen ovale.  11. Normal pericardium with no pericardial effusion.    < end of copied text > 9.2    11.4  )-----------( 304      ( 02 Aug 2019 06:51 )             27.8     Complete Blood Count in AM (19 @ 07:06)    WBC Count: 11.3 K/uL    RBC Count: 3.10 M/uL    Hemoglobin: 9.8 g/dL    Hematocrit: 28.3 %    Mean Cell Volume: 91.5 fl    Mean Cell Hemoglobin: 31.7 pg    Mean Cell Hemoglobin Conc: 34.6 gm/dL    Red Cell Distrib Width: 12.9 %    Platelet Count - Automated: 281 K/uL        138  |  99  |  9   ----------------------------<  148<H>  4.0   |  24  |  0.52    Ca    9.2      02 Aug 2019 06:51    TPro  6.7  /  Alb  4.1  /  TBili  0.7  /  DBili  x   /  AST  22  /  ALT  29  /  AlkPhos  55  08      Urinalysis Basic - ( 01 Aug 2019 16:53 )    Color: Light Yellow / Appearance: Clear / S.012 / pH: x  Gluc: x / Ketone: Negative  / Bili: Negative / Urobili: Negative   Blood: x / Protein: Negative / Nitrite: Negative   Leuk Esterase: Negative / RBC: x / WBC x   Sq Epi: x / Non Sq Epi: x / Bacteria: x    CAPILLARY BLOOD GLUCOSE      POCT Blood Glucose.: 172 mg/dL (02 Aug 2019 13:05)  POCT Blood Glucose.: 161 mg/dL (02 Aug 2019 07:54)  POCT Blood Glucose.: 106 mg/dL (01 Aug 2019 21:59)  POCT Blood Glucose.: 130 mg/dL (01 Aug 2019 16:55)      RADIOLOGY/IMAGING/OTHER TESTING  < from: 12 Lead ECG (19 @ 01:45) >  Ventricular Rate 87 BPM  Atrial Rate 87 BPM  P-R Interval 118 ms  QRS Duration 76 ms  Q-T Interval 376 ms  QTC Calculation(Bezet) 452 ms  P Axis 62 degrees  R Axis 10 degrees  T Axis 89 degrees  Diagnosis Line NORMAL SINUS RHYTHM  NONSPECIFIC T WAVE ABNORMALITY    < from: Intra-Operative Transesophageal Echo (19 @ 11:52) >    Conclusions:  1. Normal mitral valve. Mild mitral regurgitation.  2. Estimated aortic valve area equals 1.5 sqcm by  planimetry. Minimal aortic regurgitation.  3. Aortic Annulus: 1.8 cm.  Aortic Root: 2.7 cm.  Sinotubular Junction: 2 cm.  Ascending Aorta: 2.6 cm.  4. Normal left atrium.  5. Normal left ventricular internal dimensions and wall  thicknesses.  6. Normal left ventricular systolic function. No segmental  wall motion abnormalities. No regional wall motion  abnormalities.  7. Normal right atrium.  8. Normal right ventricular size and function.  9. Normal tricuspid valve. Minimal tricuspid regurgitation.  10. Color Doppler demonstrates no evidence of a patent  foramen ovale.  11. Normal pericardium with no pericardial effusion.    < end of copied text >

## 2019-08-02 NOTE — H&P ADULT - ASSESSMENT
ASSESSMENT/PLAN  ___ year-old ___ with a PMH of _  with Gait Instability, ADL impairments and Functional impairments.    COMORBIDITES/ACTIVE MEDICAL ISSUES     Gait Instability, ADL impairments and Functional impairments: start Comprehensive Rehab Program of PT/OT       Pain Mgmt - Tylenol PRN, Oxycodone PRN  GI/Bowel Mgmt -  Continent c/w Colace, Senna,  Dulcolax PRN, Miralax PRN,  /Bladder Mgmt - Continent, PVR    FEN   - Diet - Regular + Thins  [CCHO, DASH/TLC]    - Dysphagia  SLP - evaluation and treatment    Precautions / PROPHYLAXIS:   - Falls, Cardiac, Sternal, Spinal, Seizure   - ortho: Weight bearing status: WBAT   - Lungs: Aspiration, Incentive Spirometer   - Pressure injury/Skin: Turn Q2hrs while in bed, OOB to Chair, PT/OT    - DVT: Lovenox, SCDs, TEDs     MEDICAL PROGNOSIS: GOOD            REHAB POTENTIAL: GOOD             ESTIMATED DISPOSITION: HOME WITH HOME CARE            ELOS: 10-14 Days   EXPECTED THERAPY:     P.T. 1hr/day       O.T. 1hr/day      S.L.P. 1hr/day     P&O Unnecessary     EXP FREQUENCY: 5 days per 7 day period     PRESCREEN COMPARISION:   I have reviewed the prescreen information and I have found no relevant changes between the preadmission screening and my post admission evaluation     RATIONALE FOR INPATIENT ADMISSION - Patient demonstrates the following: (check all that apply)  [X] Medically appropriate for rehabilitation admission  [X] Has attainable rehab goals with an appropriate initial discharge plan  [X] Has rehabilitation potential (expected to make a significant improvement within a reasonable period of time)   [X] Requires close medical management by a rehab physician, rehab nursing care, Hospitalist and comprehensive interdisciplinary team (including PT, OT, & or SLP, Prosthetics and Orthotics) ASSESSMENT/PLAN  ___ year-old ___ with a PMH of _  with Gait Instability, ADL impairments and Functional impairments.    COMORBIDITES/ACTIVE MEDICAL ISSUES     Gait Instability, ADL impairments and Functional impairments: start Comprehensive Rehab Program of PT/OT       Pain Mgmt - Tylenol PRN, Oxycodone PRN  GI/Bowel Mgmt -  Continent c/w Colace, Senna,  Dulcolax PRN, Miralax PRN,  /Bladder Mgmt - Continent, PVR    FEN   - Diet - Regular + Thins  [CCHO, DASH/TLC]    - Dysphagia  SLP - evaluation and treatment    Precautions / PROPHYLAXIS:   - Falls, Cardiac, Sternal, Spinal, Seizure   - ortho: Weight bearing status: WBAT   - Lungs: Aspiration, Incentive Spirometer   - Pressure injury/Skin: Turn Q2hrs while in bed, OOB to Chair, PT/OT    - DVT: Lovenox, SCDs, TEDs     MEDICAL PROGNOSIS: GOOD            REHAB POTENTIAL: GOOD             ESTIMATED DISPOSITION: HOME WITH HOME CARE            ELOS: 10-14 Days   EXPECTED THERAPY:     P.T. 1.5hr/day       O.T. 1.5hr/day          P&O Unnecessary     EXP FREQUENCY: 5 days per 7 day period     PRESCREEN COMPARISION:   I have reviewed the prescreen information and I have found no relevant changes between the preadmission screening and my post admission evaluation     RATIONALE FOR INPATIENT ADMISSION - Patient demonstrates the following: (check all that apply)  [X] Medically appropriate for rehabilitation admission  [X] Has attainable rehab goals with an appropriate initial discharge plan  [X] Has rehabilitation potential (expected to make a significant improvement within a reasonable period of time)   [X] Requires close medical management by a rehab physician, rehab nursing care, Hospitalist and comprehensive interdisciplinary team (including PT, OT, & or SLP, Prosthetics and Orthotics) ASSESSMENT/PLAN  Pt is a 59 year old Kiswahili and English speaking female with PMH of HTN, HLD, DM2 who presented 07/30/19 with near syncope, found to have 95% left main and 95% RCA lesions now s/p CABG 07/27/19 with subsequent gait instability, ADL impairments and functional impairments.    #S/p CABG 7/27/19  -Discharge restrictions: Please do not lift anything greater than 5lbs. Walk daily. Do not drive until cleared by surgeon.  -Incentive spirometry q2h	  -Outpt f/u with Dr. Pallazo    #Diabetes mellitus, type II  -Humalog 2u pre-meal  -Metformin 1000mg PO bid  -ISS  -Accuchecks. 24h FS: 106-172  -Hospitalist consult    #CAD  -ASA  -Atorvastatin 40mg qhs  -Metoprolol 25mg BID  -Cardiology oupt f/u with Dr. Juárez scheduled for Friday 9/6 at 9:45 AM (Office #[phone]; [address] [facility], [address])   -Hospitalist consult    #Afebrile leukocytosis.  -08/02: 11.4, (08/01: 11.3; 07/31: 8.2)  -Monitor CBC    #Anemia.  -08/02: 9.2 (08/01: 9.8; 07/31: 8.7)  -Monitor CBC    #Pain control  -Tylenol PRN  -Percocet 5mg/325mg one tablet PRN for moderate pain  -Percocet 5mg/325 two tablets PRN for severe pain    #Hyperlipidemia  -Atorvastatin 40mg qhs    #HTN  -24h BP range: 105/65 - 110/68  -Metoprolol 25mg BID  -Furosemide 40mg PO daily  -Spironolactone 25mg PO daily    #DVT prophylaxis  -SugQ heparin  -SCDs, TEDs    #Bowel  -Colace, Miralax    #Mood  -Lexapro    #Nutrition  -Consistent carb/no snack      COMORBIDITES/ACTIVE MEDICAL ISSUES     Gait Instability, ADL impairments and Functional impairments: start Comprehensive Rehab Program of PT/OT     Pain Mgmt - Tylenol PRN, Oxycodone PRN  GI/Bowel Mgmt -  Continent c/w Colace, Miralax  /Bladder Mgmt - Continent, PVR    FEN   - Diet - CCHO      Precautions / PROPHYLAXIS:   - Fall precautions, Cardiac precautions  - ortho: Weight bearing status: weight-bearing restrictions  - Lungs: Aspiration, Incentive Spirometer   - Pressure injury/Skin: Turn Q2hrs while in bed, OOB to Chair, PT/OT    - DVT: Lovenox, SCDs, TEDs     MEDICAL PROGNOSIS: GOOD            REHAB POTENTIAL: GOOD             ESTIMATED DISPOSITION: HOME WITH HOME CARE            ELOS: 10-14 Days   EXPECTED THERAPY:     P.T. 1.5hr/day       O.T. 1.5hr/day          P&O Unnecessary     EXP FREQUENCY: 5 days per 7 day period     PRESCREEN COMPARISION:   I have reviewed the prescreen information and I have found no relevant changes between the preadmission screening and my post admission evaluation     RATIONALE FOR INPATIENT ADMISSION - Patient demonstrates the following: (check all that apply)  [X] Medically appropriate for rehabilitation admission  [X] Has attainable rehab goals with an appropriate initial discharge plan  [X] Has rehabilitation potential (expected to make a significant improvement within a reasonable period of time)   [X] Requires close medical management by a rehab physician, rehab nursing care, Hospitalist and comprehensive interdisciplinary team (including PT, OT, & or SLP, Prosthetics and Orthotics) ASSESSMENT/PLAN  Pt is a 59 year old Korean and English speaking female with PMH of HTN, HLD, DM2 who presented 07/30/19 with near syncope, found to have 95% left main and 95% RCA lesions now s/p CABG 07/27/19 with subsequent gait instability, ADL impairments and functional impairments.    #S/p CABG 7/27/19  -Discharge restrictions: Please do not lift anything greater than 5lbs. Walk daily. Do not drive until cleared by surgeon.  -Incentive spirometry q2h	  -Outpt f/u with Dr. Pallazo    #Diabetes mellitus, type II  -Humalog 2u pre-meal  -Metformin 1000mg PO bid  -ISS  -Accuchecks. 24h FS: 106-172  -Hospitalist consult    #CAD  -ASA  -Atorvastatin 40mg qhs  -Metoprolol 25mg BID  -Cardiology oupt f/u with Dr. Juárez scheduled for Friday 9/6 at 9:45 AM (Office #[phone]; [address] [facility], [address])   -Hospitalist consult    #Afebrile leukocytosis.  -08/02: 11.4, (08/01: 11.3; 07/31: 8.2)  -Monitor CBC    #Anemia.  -08/02: 9.2 (08/01: 9.8; 07/31: 8.7)  -Monitor CBC    #Pain control  -Tylenol PRN  -Percocet 5mg/325mg one tablet PRN for moderate pain  -Percocet 5mg/325 two tablets PRN for severe pain    #Hyperlipidemia  -Atorvastatin 40mg qhs    #HTN  -24h BP range: 105/65 - 110/68  -Metoprolol 25mg BID  -Furosemide 40mg PO daily  -Spironolactone 25mg PO daily    #DVT prophylaxis  -SugQ heparin  -SCDs, TEDs    #Bowel  -Colace, Miralax    #Mood  -Lexapro    #Nutrition  -Consistent carb/no snack      COMORBIDITES/ACTIVE MEDICAL ISSUES     Gait Instability, ADL impairments and Functional impairments: start Comprehensive Rehab Program of PT/OT     Pain Mgmt - Tylenol PRN, Oxycodone PRN  GI/Bowel Mgmt -  Continent c/w Colace, Miralax  /Bladder Mgmt - Continent, PVR    FEN   - Diet - CCHO      Precautions / PROPHYLAXIS:   - Fall precautions, Sternal precautions, Cardiac precautions  - ortho: Weight bearing status: weight-bearing restrictions  - Lungs: Aspiration, Incentive Spirometer   - Pressure injury/Skin: Turn Q2hrs while in bed, OOB to Chair, PT/OT    - DVT: Lovenox, SCDs, TEDs     MEDICAL PROGNOSIS: GOOD            REHAB POTENTIAL: GOOD             ESTIMATED DISPOSITION: HOME WITH HOME CARE            ELOS: 10-14 Days   EXPECTED THERAPY:     P.T. 1.5hr/day       O.T. 1.5hr/day          P&O Unnecessary     EXP FREQUENCY: 5 days per 7 day period     PRESCREEN COMPARISION:   I have reviewed the prescreen information and I have found no relevant changes between the preadmission screening and my post admission evaluation     RATIONALE FOR INPATIENT ADMISSION - Patient demonstrates the following: (check all that apply)  [X] Medically appropriate for rehabilitation admission  [X] Has attainable rehab goals with an appropriate initial discharge plan  [X] Has rehabilitation potential (expected to make a significant improvement within a reasonable period of time)   [X] Requires close medical management by a rehab physician, rehab nursing care, Hospitalist and comprehensive interdisciplinary team (including PT, OT, & or SLP, Prosthetics and Orthotics) ASSESSMENT/PLAN  Pt is a 59 year old Wolof and English speaking female with PMH of HTN, HLD, DM2 who presented 07/30/19 with near syncope, found to have 95% left main and 95% RCA lesions now s/p CABG 07/27/19 with subsequent gait instability, ADL impairments and functional impairments.    #S/p CABG 7/27/19  -Discharge restrictions: Please do not lift anything greater than 5lbs. Walk daily. Do not drive until cleared by surgeon.  -Incentive spirometry q2h	  -Outpt f/u with Dr. Pallazo    #Diabetes mellitus, type II  -Humalog 2u pre-meal  -Metformin 1000mg PO bid  -ISS  -Accuchecks. 24h FS: 106-172  -Hospitalist consult    #CAD  -ASA  -Atorvastatin 40mg qhs  -Metoprolol 25mg BID  -Cardiology oupt f/u with Dr. Juárez scheduled for Friday 9/6 at 9:45 AM (Office #[phone]; [address] [facility], [address])   -Hospitalist consult    #Afebrile leukocytosis.  -08/02: 11.4, (08/01: 11.3; 07/31: 8.2)  -Monitor CBC    #Anemia.  -08/02: 9.2 (08/01: 9.8; 07/31: 8.7)  -Monitor CBC    #Pain control  -Tylenol PRN  -Percocet 5mg/325mg one tablet PRN for moderate pain  -Percocet 5mg/325 two tablets PRN for severe pain    #Hyperlipidemia  -Atorvastatin 40mg qhs    #HTN  -24h BP range: 105/65 - 110/68  -Metoprolol 25mg BID  -Furosemide 40mg PO daily  -Spironolactone 25mg PO daily    #DVT prophylaxis  -SugQ heparin  -SCDs, TEDs    #Bowel  -Colace, Miralax    #Mood  -Lexapro    #Nutrition  -Consistent carb/no snack      COMORBIDITES/ACTIVE MEDICAL ISSUES     Gait Instability, ADL impairments and Functional impairments: start Comprehensive Rehab Program of PT/OT     Pain Mgmt - Tylenol PRN, Oxycodone PRN  GI/Bowel Mgmt -  Continent c/w Colace, Miralax  /Bladder Mgmt - Continent, PVR    FEN   - Diet - CCHO      Precautions / PROPHYLAXIS:   - Fall precautions, Sternal precautions, Cardiac precautions  - ortho: Weight bearing status: WBAT  - Lungs: Aspiration, Incentive Spirometer   - Pressure injury/Skin: Turn Q2hrs while in bed, OOB to Chair, PT/OT    - DVT: Lovenox, SCDs, TEDs     MEDICAL PROGNOSIS: GOOD            REHAB POTENTIAL: GOOD             ESTIMATED DISPOSITION: HOME WITH HOME CARE            ELOS: 10-14 Days   EXPECTED THERAPY:     P.T. 1.5hr/day       O.T. 1.5hr/day          P&O Unnecessary     EXP FREQUENCY: 5 days per 7 day period     PRESCREEN COMPARISION:   I have reviewed the prescreen information and I have found no relevant changes between the preadmission screening and my post admission evaluation     RATIONALE FOR INPATIENT ADMISSION - Patient demonstrates the following: (check all that apply)  [X] Medically appropriate for rehabilitation admission  [X] Has attainable rehab goals with an appropriate initial discharge plan  [X] Has rehabilitation potential (expected to make a significant improvement within a reasonable period of time)   [X] Requires close medical management by a rehab physician, rehab nursing care, Hospitalist and comprehensive interdisciplinary team (including PT, OT, & or SLP, Prosthetics and Orthotics) ASSESSMENT/PLAN  Pt is a 59 year old German and English speaking female with PMH of HTN, HLD, DM2 who presented 07/30/19 with near syncope, found to have 95% left main and 95% RCA lesions now s/p CABG 07/27/19 with subsequent gait instability, ADL impairments and functional impairments.    #S/p CABG 7/27/19  -Continue with comprehensive rehab program  -Continue with BP and CAD management as below  -Continue with Lasix for 3 more days (end 8/4), continue with spironolactone for 3 more days (end on 8/5)  -Incentive spirometry q2h  -Strict intake/output, daily weights	  -Outpt f/u with Dr. Pallazo    #CAD:  -ASA  -Atorvastatin 40mg qhs  -Metoprolol 25mg BID  -Cardiology oupt f/u with Dr. Juárez   -Hospitalist consult    #HTN  -Metoprolol 25mg BID  -Furosemide 40mg PO daily  -Spironolactone 25mg PO daily    #Diabetes mellitus, type II  -Humalog 2u pre-meal  -Metformin 1000mg PO bid  -ISS  -Hospitalist consult    #Leukocytosis: Pt afebrile   -08/02: 11.4, (08/01: 11.3; 07/31: 8.2)  -Monitor CBC    #Anemia.  -08/02: 9.2 (08/01: 9.8; 07/31: 8.7)  -Monitor CBC    #Pain control  -Tylenol PRN, Percocet PRN    #Hyperlipidemia  -Atorvastatin 40mg qhs    #DVT prophylaxis  -SubQ heparin  -SCDs, TEDs    #Bowel  -Colace, Miralax    #Mood  -Lexapro    #Nutrition  -Regular diet Consistent carb/no snack    COMORBIDITES/ACTIVE MEDICAL ISSUES     Gait Instability, ADL impairments and Functional impairments: start Comprehensive Rehab Program of PT/OT     Pain Mgmt - Tylenol PRN, Percocet PRN  GI/Bowel Mgmt -  Continent c/w Colace, Miralax  /Bladder Mgmt - Monitor PVR, strict intake/output    FEN   - Diet - CCHO      Precautions / PROPHYLAXIS:   - Fall precautions, Sternal precautions, Cardiac precautions  - ortho: Weight bearing status: WBAT  - Lungs: Aspiration, Incentive Spirometer   - Pressure injury/Skin: Turn Q2hrs while in bed, OOB to Chair, PT/OT    - DVT: Heparin, SCDs, TEDs     MEDICAL PROGNOSIS: GOOD            REHAB POTENTIAL: GOOD             ESTIMATED DISPOSITION: HOME WITH HOME CARE            ELOS: 10-14 Days   EXPECTED THERAPY:     P.T. 1.5hr/day       O.T. 1.5hr/day          P&O Unnecessary     EXP FREQUENCY: 5 days per 7 day period     PRESCREEN COMPARISION:   I have reviewed the prescreen information and I have found no relevant changes between the preadmission screening and my post admission evaluation     RATIONALE FOR INPATIENT ADMISSION - Patient demonstrates the following: (check all that apply)  [X] Medically appropriate for rehabilitation admission  [X] Has attainable rehab goals with an appropriate initial discharge plan  [X] Has rehabilitation potential (expected to make a significant improvement within a reasonable period of time)   [X] Requires close medical management by a rehab physician, rehab nursing care, Hospitalist and comprehensive interdisciplinary team (including PT, OT, & or SLP, Prosthetics and Orthotics)      Dr. Ordoñez made aware

## 2019-08-03 LAB
ALBUMIN SERPL ELPH-MCNC: 3 G/DL — LOW (ref 3.3–5)
ALP SERPL-CCNC: 53 U/L — SIGNIFICANT CHANGE UP (ref 40–120)
ALT FLD-CCNC: 39 U/L DA — SIGNIFICANT CHANGE UP (ref 10–45)
ANION GAP SERPL CALC-SCNC: 6 MMOL/L — SIGNIFICANT CHANGE UP (ref 5–17)
AST SERPL-CCNC: 33 U/L — SIGNIFICANT CHANGE UP (ref 10–40)
BASOPHILS # BLD AUTO: 0 K/UL — SIGNIFICANT CHANGE UP (ref 0–0.2)
BASOPHILS NFR BLD AUTO: 0 % — SIGNIFICANT CHANGE UP (ref 0–2)
BILIRUB SERPL-MCNC: 0.8 MG/DL — SIGNIFICANT CHANGE UP (ref 0.2–1.2)
BUN SERPL-MCNC: 11 MG/DL — SIGNIFICANT CHANGE UP (ref 7–23)
CALCIUM SERPL-MCNC: 8.6 MG/DL — SIGNIFICANT CHANGE UP (ref 8.4–10.5)
CHLORIDE SERPL-SCNC: 101 MMOL/L — SIGNIFICANT CHANGE UP (ref 96–108)
CO2 SERPL-SCNC: 30 MMOL/L — SIGNIFICANT CHANGE UP (ref 22–31)
CREAT SERPL-MCNC: 0.63 MG/DL — SIGNIFICANT CHANGE UP (ref 0.5–1.3)
EOSINOPHIL # BLD AUTO: 0.58 K/UL — HIGH (ref 0–0.5)
EOSINOPHIL NFR BLD AUTO: 5 % — SIGNIFICANT CHANGE UP (ref 0–6)
GLUCOSE SERPL-MCNC: 171 MG/DL — HIGH (ref 70–99)
HCT VFR BLD CALC: 25.8 % — LOW (ref 34.5–45)
HGB BLD-MCNC: 8.3 G/DL — LOW (ref 11.5–15.5)
LYMPHOCYTES # BLD AUTO: 1.73 K/UL — SIGNIFICANT CHANGE UP (ref 1–3.3)
LYMPHOCYTES # BLD AUTO: 15 % — SIGNIFICANT CHANGE UP (ref 13–44)
MAGNESIUM SERPL-MCNC: 1.5 MG/DL — LOW (ref 1.6–2.6)
MCHC RBC-ENTMCNC: 30 PG — SIGNIFICANT CHANGE UP (ref 27–34)
MCHC RBC-ENTMCNC: 32.2 GM/DL — SIGNIFICANT CHANGE UP (ref 32–36)
MCV RBC AUTO: 93.1 FL — SIGNIFICANT CHANGE UP (ref 80–100)
MONOCYTES # BLD AUTO: 1.27 K/UL — HIGH (ref 0–0.9)
MONOCYTES NFR BLD AUTO: 11 % — SIGNIFICANT CHANGE UP (ref 2–14)
NEUTROPHILS # BLD AUTO: 7.84 K/UL — HIGH (ref 1.8–7.4)
NEUTROPHILS NFR BLD AUTO: 66 % — SIGNIFICANT CHANGE UP (ref 43–77)
PLATELET # BLD AUTO: 295 K/UL — SIGNIFICANT CHANGE UP (ref 150–400)
POTASSIUM SERPL-MCNC: 4.3 MMOL/L — SIGNIFICANT CHANGE UP (ref 3.5–5.3)
POTASSIUM SERPL-SCNC: 4.3 MMOL/L — SIGNIFICANT CHANGE UP (ref 3.5–5.3)
PROT SERPL-MCNC: 6.4 G/DL — SIGNIFICANT CHANGE UP (ref 6–8.3)
RBC # BLD: 2.77 M/UL — LOW (ref 3.8–5.2)
RBC # FLD: 14.9 % — HIGH (ref 10.3–14.5)
SODIUM SERPL-SCNC: 137 MMOL/L — SIGNIFICANT CHANGE UP (ref 135–145)
WBC # BLD: 11.53 K/UL — HIGH (ref 3.8–10.5)
WBC # FLD AUTO: 11.53 K/UL — HIGH (ref 3.8–10.5)

## 2019-08-03 PROCEDURE — 99223 1ST HOSP IP/OBS HIGH 75: CPT

## 2019-08-03 PROCEDURE — 99222 1ST HOSP IP/OBS MODERATE 55: CPT | Mod: GC

## 2019-08-03 RX ORDER — DOCUSATE SODIUM 100 MG
100 CAPSULE ORAL
Refills: 0 | Status: DISCONTINUED | OUTPATIENT
Start: 2019-08-03 | End: 2019-08-17

## 2019-08-03 RX ORDER — ASCORBIC ACID 60 MG
500 TABLET,CHEWABLE ORAL DAILY
Refills: 0 | Status: DISCONTINUED | OUTPATIENT
Start: 2019-08-03 | End: 2019-08-08

## 2019-08-03 RX ORDER — POLYETHYLENE GLYCOL 3350 17 G/17G
17 POWDER, FOR SOLUTION ORAL DAILY
Refills: 0 | Status: DISCONTINUED | OUTPATIENT
Start: 2019-08-03 | End: 2019-08-17

## 2019-08-03 RX ORDER — FUROSEMIDE 40 MG
20 TABLET ORAL DAILY
Refills: 0 | Status: DISCONTINUED | OUTPATIENT
Start: 2019-08-04 | End: 2019-08-07

## 2019-08-03 RX ORDER — INSULIN GLARGINE 100 [IU]/ML
10 INJECTION, SOLUTION SUBCUTANEOUS AT BEDTIME
Refills: 0 | Status: DISCONTINUED | OUTPATIENT
Start: 2019-08-03 | End: 2019-08-17

## 2019-08-03 RX ORDER — FERROUS SULFATE 325(65) MG
325 TABLET ORAL
Refills: 0 | Status: DISCONTINUED | OUTPATIENT
Start: 2019-08-03 | End: 2019-08-17

## 2019-08-03 RX ADMIN — HEPARIN SODIUM 5000 UNIT(S): 5000 INJECTION INTRAVENOUS; SUBCUTANEOUS at 05:32

## 2019-08-03 RX ADMIN — Medication 1: at 11:56

## 2019-08-03 RX ADMIN — FAMOTIDINE 20 MILLIGRAM(S): 10 INJECTION INTRAVENOUS at 17:42

## 2019-08-03 RX ADMIN — ATORVASTATIN CALCIUM 40 MILLIGRAM(S): 80 TABLET, FILM COATED ORAL at 21:34

## 2019-08-03 RX ADMIN — OXYCODONE AND ACETAMINOPHEN 1 TABLET(S): 5; 325 TABLET ORAL at 18:29

## 2019-08-03 RX ADMIN — Medication 2 UNIT(S): at 08:22

## 2019-08-03 RX ADMIN — Medication 1: at 17:42

## 2019-08-03 RX ADMIN — METFORMIN HYDROCHLORIDE 1000 MILLIGRAM(S): 850 TABLET ORAL at 05:31

## 2019-08-03 RX ADMIN — ESCITALOPRAM OXALATE 10 MILLIGRAM(S): 10 TABLET, FILM COATED ORAL at 11:57

## 2019-08-03 RX ADMIN — OXYCODONE AND ACETAMINOPHEN 1 TABLET(S): 5; 325 TABLET ORAL at 05:56

## 2019-08-03 RX ADMIN — HEPARIN SODIUM 5000 UNIT(S): 5000 INJECTION INTRAVENOUS; SUBCUTANEOUS at 21:33

## 2019-08-03 RX ADMIN — Medication 100 MILLIGRAM(S): at 05:31

## 2019-08-03 RX ADMIN — Medication 25 MILLIGRAM(S): at 17:42

## 2019-08-03 RX ADMIN — Medication 40 MILLIGRAM(S): at 05:31

## 2019-08-03 RX ADMIN — Medication 81 MILLIGRAM(S): at 11:57

## 2019-08-03 RX ADMIN — INSULIN GLARGINE 10 UNIT(S): 100 INJECTION, SOLUTION SUBCUTANEOUS at 21:33

## 2019-08-03 RX ADMIN — Medication 500 MILLIGRAM(S): at 15:10

## 2019-08-03 RX ADMIN — METFORMIN HYDROCHLORIDE 1000 MILLIGRAM(S): 850 TABLET ORAL at 17:42

## 2019-08-03 RX ADMIN — Medication 3: at 08:22

## 2019-08-03 RX ADMIN — Medication 25 MILLIGRAM(S): at 05:31

## 2019-08-03 RX ADMIN — SPIRONOLACTONE 25 MILLIGRAM(S): 25 TABLET, FILM COATED ORAL at 06:48

## 2019-08-03 RX ADMIN — FAMOTIDINE 20 MILLIGRAM(S): 10 INJECTION INTRAVENOUS at 05:31

## 2019-08-03 RX ADMIN — HEPARIN SODIUM 5000 UNIT(S): 5000 INJECTION INTRAVENOUS; SUBCUTANEOUS at 15:10

## 2019-08-03 RX ADMIN — Medication 325 MILLIGRAM(S): at 17:48

## 2019-08-03 NOTE — CONSULT NOTE ADULT - SUBJECTIVE AND OBJECTIVE BOX
59 year old Wolof and English speaking female with PMH of HTN, HLD, DM2 who presented to [facility] ED  with near syncope after arguing with her daughter. Patient also had acute onset nausea, diaphoresis, palpitations, dizziness, and mild chest pressure. NO chest pain, SOB, AGARWAL, PND, orthopnea, increased lower extremity edema, fever/chills, cough. The patient had cath at [facility] which showed 95% left main and 95% RCA lesions and she was transferred to [facility] for CABG on  with Dr. Ramey. Hospital course sig. for chest tube placement (DC'd ), RLE ghsasan (DC'd ), small R thigh hematoma () and afebrile leukocytosis to 11 (). Admitted to [facility] for gait instability, ADL impairments and functional impairments s/p CABG.    seen at the bedside, c/o chest discomfort, 4/10- 5/10, aggravated by movements, and getting out of bed, feeling like burning round the incision site, no dizziness, no sob, no n/v. no dysuria, 3 BM since yesterday.     Review of Systems: per hpi        Allergies and Intolerances:        Allergies:  	metronidazole: Drug, Rash       Intolerances:  	Norvasc: Drug, Swelling, le swelling    Home Medications:   * Patient Currently Takes Medications as of 02-Aug-2019 11:50 documented in Structured Notes  · 	polyethylene glycol 3350 oral powder for reconstitution: 17 gram(s) orally once a day  · 	docusate sodium 100 mg oral capsule: 1 cap(s) orally 3 times a day  · 	famotidine 20 mg oral tablet: 1 tab(s) orally 2 times a day  · 	spironolactone 25 mg oral tablet: 1 tab(s) orally once a day for 3 more days end on   · 	furosemide 40 mg oral tablet: 1 tab(s) orally once a day for 3 more days end on   · 	metoprolol tartrate 25 mg oral tablet: 1 tab(s) orally 2 times a day  · 	atorvastatin 40 mg oral tablet: 1 tab(s) orally once a day (at bedtime)  · 	heparin: 5000 unit(s) subcutaneous every 8 hours  · 	oxycodone-acetaminophen 5 mg-325 mg oral tablet: 1 tab(s) orally every 6 hours, As needed, Moderate Pain (4 - 6)  · 	HumaLOG 100 units/mL subcutaneous solution: 2 unit(s) subcutaneous 3 times a day (before meals)  · 	metFORMIN 1000 mg oral tablet: 1 tab(s) orally 2 times a day  · 	Aspirin Enteric Coated 81 mg oral delayed release tablet: 1 tab(s) orally once a day  · 	escitalopram 10 mg oral tablet: 1 tab(s) orally once a day    Patient History:   Past Medical, Past Surgical, and Family History:  PAST MEDICAL HISTORY:  Benign hypertension     Diabetes mellitus, type II     Gall stone pancreatitis S/P laparoscopic cholecystectomy    Low back pain.     PAST SURGICAL HISTORY:  H/O:      History of cholecystectomy laparoscopic cholecystectomy.     FAMILY HISTORY:  Family history of cancer  Family history of diabetes mellitus (DM).    Social History: no smoking or etoh    Vital Signs Last 24 Hrs  T(C): 36.7 (03 Aug 2019 08:27), Max: 37 (02 Aug 2019 14:37)  T(F): 98.1 (03 Aug 2019 08:27), Max: 98.6 (02 Aug 2019 14:37)  HR: 70 (03 Aug 2019 08:27) (70 - 78)  BP: 93/59 (03 Aug 2019 08:27) (93/59 - 134/87)  BP(mean): --  RR: 14 (03 Aug 2019 08:27) (14 - 18)  SpO2: 98% (03 Aug 2019 08:27) (98% - 100%)    GENERAL- NAD  EAR/NOSE/MOUTH/THROAT - no pharyngeal exudates,   MMM  EYES- NILA, conjunctiva and Sclera clear  NECK- supple  RESPIRATORY-  clear to auscultation bilaterally  CARDIOVASCULAR - SIS2, RRR, chest incision clean  GI - soft NT BS present  EXTREMITIES- no pedal edema  NEUROLOGY- no gross focal deficits  SKIN- no rashes, warm to touch chest incision clean, incision on the right LE clean, surrounding ecchymosis noted in  the R medial thigh  PSYCHIATRY- AAO X 3  MUSCULOSKELETAL- ROM normal                8.3                  137  | 30   | 11           11.53 >-----------< 295     ------------------------< 171                   25.8                 4.3  | 101  | 0.63                                         Ca 8.6   Mg 1.5   Ph x      Urinalysis Basic - ( 01 Aug 2019 16:53 )    Color: Light Yellow / Appearance: Clear / S.012 / pH: x  Gluc: x / Ketone: Negative  / Bili: Negative / Urobili: Negative   Blood: x / Protein: Negative / Nitrite: Negative   Leuk Esterase: Negative / RBC: x / WBC x   Sq Epi: x / Non Sq Epi: x / Bacteria: x    Labs reviewed:     CAPILLARY BLOOD GLUCOSE      POCT Blood Glucose.: 275 mg/dL (03 Aug 2019 07:35)  POCT Blood Glucose.: 135 mg/dL (02 Aug 2019 20:20)  POCT Blood Glucose.: 172 mg/dL (02 Aug 2019 13:05)    CXR personally reviewed: Clear lung fields bilaterally  < from: Xray Chest 1 View- PORTABLE-Urgent (19 @ 10:53) >    EXAM:  XR CHEST PORTABLE URGENT 1V                            PROCEDURE DATE:  2019            INTERPRETATION:  CLINICAL INFORMATION: Elevated white count.    TECHNIQUE: Portable AP radiograph of the chest.    COMPARISON: Portable AP of the chest 2019.    FINDINGS:    The lungs are clear. Left pleural effusion.    Heart size cannot be accurately evaluated on this projection.      Unremarkable skeletal structures.      IMPRESSION:     Left pleural effusion.          ECG reviewed and interpreted:     < from: 12 Lead ECG (19 @ 01:45) >  Ventricular Rate 87 BPM    Atrial Rate 87 BPM    P-R Interval 118 ms    QRS Duration 76 ms    Q-T Interval 376 ms    QTC Calculation(Bezet) 452 ms    P Axis 62 degrees    R Axis 10 degrees    T Axis 89 degrees    Diagnosis Line NORMAL SINUS RHYTHM  NONSPECIFIC T WAVE ABNORMALITY    < from: Transthoracic Echocardiogram (17 @ 10:43) >  EF (Teicholtz): 75 %    < end of copied text >      < from: Intra-Operative Transesophageal Echo (19 @ 11:52) >  Conclusions:  1. Normal mitral valve. Mild mitral regurgitation.  2. Estimated aortic valve area equals 1.5 sqcm by  planimetry. Minimal aortic regurgitation.  3. Aortic Annulus: 1.8 cm.  Aortic Root: 2.7 cm.  Sinotubular Junction: 2 cm.  Ascending Aorta: 2.6 cm.  4. Normal left atrium.  5. Normal left ventricular internal dimensions and wall  thicknesses.  6. Normal left ventricular systolic function. No segmental  wall motion abnormalities. No regional wall motion  abnormalities.  7. Normal right atrium.  8. Normal right ventricular size and function.  9. Normal tricuspid valve. Minimal tricuspid regurgitation.  10. Color Doppler demonstrates no evidence of a patent  foramen ovale.  11. Normal pericardium with no pericardial effusion.  Confirmed on  2019      < from: Cardiac Cath Lab - Adult (19 @ 17:28) >  CORONARY VESSELS: The coronary circulation is right dominant.  LM:   --  Distal left main: There was a tubular 95 % stenosis at a site  with no prior intervention. The lesion was eccentric and heavily  calcified. There was JEFF grade 3 flow through the vessel (brisk flow).  LAD:   --  D1: Angiography showed mild atherosclerosis with no flow  limiting lesions.  --  D2: Angiography showed mild atherosclerosis with no flow limiting  lesions.  CX:   --  Proximal circumflex: There was a 100 % stenosis. There was a  moderate-sized vascular territory distal to the lesion and poor collateral  blood supply to the distal myocardium. This lesion is a chronic total  occlusion.  RCA:   --  Mid RCA: There was a diffuse 95 % stenosis at a site with no  prior intervention. The lesion was eccentric and heavily calcified. There  was JEFF grade 3 flow through the vessel (brisk flow).

## 2019-08-03 NOTE — DIETITIAN INITIAL EVALUATION ADULT. - DIET TYPE
Consistent Carbohydrate Diet w/ Thin Liquids  Recommend Change Consistent Carbohydrate DASH-TLC Diet w/ Thin Liquids  Nutrition Education Provided on Consistent Carbohydrate DASH-TLC Diet  Patient Does Not Follow Diet @Home & Does Take Vitamin/Supplements @Home DASH/TLC (sodium and cholesterol restricted diet)/Recommend Change Diet to Consistent Carbohydrate DASH-TLC Diet/consistent carbohydrate (no snacks)

## 2019-08-03 NOTE — CONSULT NOTE ADULT - ASSESSMENT
59 year old Turkmen and English speaking female with PMH of HTN, HLD, DM2 who presented 07/30/19 with near syncope, found to have 95% left main and 95% RCA lesions now s/p CABG 07/27/19 with subsequent gait instability, ADL impairments and functional impairments- pt/ot/dvt ppx, pain meds, Lasix for 3 more days (end 8/4), continue with spironolactone for 3 more days (end on 8/5)    CAD:-ASA    HLD- Atorvastatin    HTN -Metoprolol, will decrease lasix to 20 mg daily, will stop aldactone since BP noted to be low this am.     Diabetes mellitus, type 2- WILL START LANTUS, ISS, c/w metformin, will d/c premeal.  Hemoglobin A1C, Whole Blood: 6.9    Leukocytosis: ? unclear etiology likley reactive, , cxr- no infiltrate 8/3/19, Pt afebrile, ua negative, no dysuria, will monitor ,     Anemia- likely post op blood loss anemia will monitor h/h, will start iron/vit c    Pain control -Tylenol PRN, Percocet PRN    Hyperlipidemia- Atorvastatin 40mg qhs    DVT prophylaxis-SubQ heparin    Bowel- prn Colace, Miralax    Mood-Lexapro    c/w current care, will follow

## 2019-08-03 NOTE — DIETITIAN INITIAL EVALUATION ADULT. - OTHER INFO
59yr Old Female - Dx: CABG - Initial Assessment - Consistent Carbohydrate w/ Thin Liquids (Recommend Change to Consistent Carbohydrate DASH-TLC Diet), Denies Food Allergy/Intolerance, Tolerates Diet Well, No Chewing/Swallowing Complications (Per Patient),  Surgical Incision & No Pressure Ulcers (as Per Nursing Flow Sheets), No Edema Noted (as Per Nursing Flow Sheets), No Recent Vomiting/Diarrhea/Constipation (as Per Patient)

## 2019-08-03 NOTE — DIETITIAN INITIAL EVALUATION ADULT. - ADD RECOMMEND
1) Monitor Weights, Intake, Tolerance, Skin, POCT & Labwork   2) Recommend Change Diet to Consistent Carbohydrate DASH-TLC Diet 3) Nutrition Education Provided on Consistent Carbohydrate DASH-TLC Diet 4) Continue Nutrition Plan of Care

## 2019-08-03 NOTE — PROGRESS NOTE ADULT - SUBJECTIVE AND OBJECTIVE BOX
See H&P  a.m. labs                        8.3    11.53 )-----------( 295      ( 03 Aug 2019 05:12 )             25.8     08-03    137  |  101  |  11  ----------------------------<  171<H>  4.3   |  30  |  0.63    Ca    8.6      03 Aug 2019 05:12  Mg     1.5     08-03    TPro  6.4  /  Alb  3.0<L>  /  TBili  0.8  /  DBili  x   /  AST  33  /  ALT  39  /  AlkPhos  53  08-03      - Monitor anemia- will get f/u in a.m. 8/4  - Consult Hospitalist  - Continue Rehab Program

## 2019-08-04 LAB
ANION GAP SERPL CALC-SCNC: 7 MMOL/L — SIGNIFICANT CHANGE UP (ref 5–17)
BUN SERPL-MCNC: 14 MG/DL — SIGNIFICANT CHANGE UP (ref 7–23)
CALCIUM SERPL-MCNC: 8.9 MG/DL — SIGNIFICANT CHANGE UP (ref 8.4–10.5)
CHLORIDE SERPL-SCNC: 102 MMOL/L — SIGNIFICANT CHANGE UP (ref 96–108)
CO2 SERPL-SCNC: 28 MMOL/L — SIGNIFICANT CHANGE UP (ref 22–31)
CREAT SERPL-MCNC: 0.7 MG/DL — SIGNIFICANT CHANGE UP (ref 0.5–1.3)
GLUCOSE SERPL-MCNC: 171 MG/DL — HIGH (ref 70–99)
HCT VFR BLD CALC: 26 % — LOW (ref 34.5–45)
HGB BLD-MCNC: 8.2 G/DL — LOW (ref 11.5–15.5)
MCHC RBC-ENTMCNC: 30 PG — SIGNIFICANT CHANGE UP (ref 27–34)
MCHC RBC-ENTMCNC: 31.5 GM/DL — LOW (ref 32–36)
MCV RBC AUTO: 95.2 FL — SIGNIFICANT CHANGE UP (ref 80–100)
NRBC # BLD: 1 /100 WBCS — HIGH (ref 0–0)
PLATELET # BLD AUTO: 326 K/UL — SIGNIFICANT CHANGE UP (ref 150–400)
POTASSIUM SERPL-MCNC: 4.1 MMOL/L — SIGNIFICANT CHANGE UP (ref 3.5–5.3)
POTASSIUM SERPL-SCNC: 4.1 MMOL/L — SIGNIFICANT CHANGE UP (ref 3.5–5.3)
RBC # BLD: 2.73 M/UL — LOW (ref 3.8–5.2)
RBC # FLD: 15.3 % — HIGH (ref 10.3–14.5)
SODIUM SERPL-SCNC: 137 MMOL/L — SIGNIFICANT CHANGE UP (ref 135–145)
WBC # BLD: 11.21 K/UL — HIGH (ref 3.8–10.5)
WBC # FLD AUTO: 11.21 K/UL — HIGH (ref 3.8–10.5)

## 2019-08-04 PROCEDURE — 99232 SBSQ HOSP IP/OBS MODERATE 35: CPT

## 2019-08-04 PROCEDURE — 99233 SBSQ HOSP IP/OBS HIGH 50: CPT

## 2019-08-04 RX ADMIN — Medication 650 MILLIGRAM(S): at 07:00

## 2019-08-04 RX ADMIN — Medication 81 MILLIGRAM(S): at 11:47

## 2019-08-04 RX ADMIN — ATORVASTATIN CALCIUM 40 MILLIGRAM(S): 80 TABLET, FILM COATED ORAL at 21:32

## 2019-08-04 RX ADMIN — FAMOTIDINE 20 MILLIGRAM(S): 10 INJECTION INTRAVENOUS at 06:15

## 2019-08-04 RX ADMIN — Medication 25 MILLIGRAM(S): at 17:08

## 2019-08-04 RX ADMIN — OXYCODONE AND ACETAMINOPHEN 1 TABLET(S): 5; 325 TABLET ORAL at 22:30

## 2019-08-04 RX ADMIN — Medication 20 MILLIGRAM(S): at 06:15

## 2019-08-04 RX ADMIN — HEPARIN SODIUM 5000 UNIT(S): 5000 INJECTION INTRAVENOUS; SUBCUTANEOUS at 06:15

## 2019-08-04 RX ADMIN — OXYCODONE AND ACETAMINOPHEN 1 TABLET(S): 5; 325 TABLET ORAL at 21:33

## 2019-08-04 RX ADMIN — FAMOTIDINE 20 MILLIGRAM(S): 10 INJECTION INTRAVENOUS at 17:05

## 2019-08-04 RX ADMIN — Medication 500 MILLIGRAM(S): at 11:47

## 2019-08-04 RX ADMIN — Medication 325 MILLIGRAM(S): at 17:05

## 2019-08-04 RX ADMIN — Medication 2: at 17:08

## 2019-08-04 RX ADMIN — HEPARIN SODIUM 5000 UNIT(S): 5000 INJECTION INTRAVENOUS; SUBCUTANEOUS at 14:31

## 2019-08-04 RX ADMIN — OXYCODONE AND ACETAMINOPHEN 1 TABLET(S): 5; 325 TABLET ORAL at 11:46

## 2019-08-04 RX ADMIN — ESCITALOPRAM OXALATE 10 MILLIGRAM(S): 10 TABLET, FILM COATED ORAL at 11:47

## 2019-08-04 RX ADMIN — METFORMIN HYDROCHLORIDE 1000 MILLIGRAM(S): 850 TABLET ORAL at 17:05

## 2019-08-04 RX ADMIN — METFORMIN HYDROCHLORIDE 1000 MILLIGRAM(S): 850 TABLET ORAL at 08:05

## 2019-08-04 RX ADMIN — Medication 3: at 08:04

## 2019-08-04 RX ADMIN — Medication 25 MILLIGRAM(S): at 06:15

## 2019-08-04 RX ADMIN — HEPARIN SODIUM 5000 UNIT(S): 5000 INJECTION INTRAVENOUS; SUBCUTANEOUS at 21:32

## 2019-08-04 RX ADMIN — Medication 325 MILLIGRAM(S): at 06:15

## 2019-08-04 RX ADMIN — INSULIN GLARGINE 10 UNIT(S): 100 INJECTION, SOLUTION SUBCUTANEOUS at 21:32

## 2019-08-04 RX ADMIN — Medication 650 MILLIGRAM(S): at 06:14

## 2019-08-04 NOTE — PROGRESS NOTE ADULT - SUBJECTIVE AND OBJECTIVE BOX
59 year old Tamazight and English speaking female with PMH of HTN, HLD, DM2 who presented to [facility] ED 7/25 with near syncope after arguing with her daughter. Patient also had acute onset nausea, diaphoresis, palpitations, dizziness, and mild chest pressure. NO chest pain, SOB, AGARWAL, PND, orthopnea, increased lower extremity edema, fever/chills, cough. The patient had cath at [facility] which showed 95% left main and 95% RCA lesions and she was transferred to [facility] for CABG on 7/27 with Dr. Ramey. Hospital course sig. for chest tube placement (DC'd 7/30), RLE ghassan (DC'd 7/30), small R thigh hematoma (8/1) and afebrile leukocytosis to 11 (8/1). Admitted to [facility] re[facility] for gait instability, ADL impairments and functional impairments s/p CABG.    seen at the bedside, still c/o chest discomfort, 5/10, aggravated by movements, and getting out of bed, burning round the incision site, no dizziness, no sob, no n/v. no dysuria, requests pain meds      Vital Signs Last 24 Hrs  T(C): 36.9 (04 Aug 2019 08:57), Max: 36.9 (04 Aug 2019 08:57)  T(F): 98.5 (04 Aug 2019 08:57), Max: 98.5 (04 Aug 2019 08:57)  HR: 65 (04 Aug 2019 08:57) (65 - 722)  BP: 92/54 (04 Aug 2019 08:57) (92/54 - 95/59)  BP(mean): --  RR: 14 (04 Aug 2019 08:57) (14 - 14)  SpO2: 94% (04 Aug 2019 08:57) (94% - 97%)      GENERAL- NAD  EAR/NOSE/MOUTH/THROAT - no pharyngeal exudates,   MMM  EYES- NILA, conjunctiva and Sclera clear  NECK- supple  RESPIRATORY-  clear to auscultation bilaterally  CARDIOVASCULAR - SIS2, RRR, chest incision clean  GI - soft NT BS present  EXTREMITIES- no pedal edema  NEUROLOGY- no gross focal deficits  SKIN- no rashes, warm to touch chest incision clean, incision on the right LE clean, surrounding ecchymosis noted in  the R medial thigh  PSYCHIATRY- AAO X 3  MUSCULOSKELETAL- ROM normal                        8.2                  137  | 28   | 14           11.21 >-----------< 326     ------------------------< 171                   26.0                 4.1  | 102  | 0.70                                         Ca 8.9   Mg x     Ph x            CAPILLARY BLOOD GLUCOSE      POCT Blood Glucose.: 275 mg/dL( non fasting, patient was given apple sauce early am before Accu-Chek she was hungry)  (04 Aug 2019 07:36)  POCT Blood Glucose.: 155 mg/dL (03 Aug 2019 21:27)  POCT Blood Glucose.: 169 mg/dL (03 Aug 2019 17:41)  POCT Blood Glucose.: 198 mg/dL (03 Aug 2019 11:54)    POCT Blood Glucose.: 275 mg/dL (03 Aug 2019 07:35)  POCT Blood Glucose.: 135 mg/dL (02 Aug 2019 20:20)  POCT Blood Glucose.: 172 mg/dL (02 Aug 2019 13:05)

## 2019-08-04 NOTE — PROGRESS NOTE ADULT - SUBJECTIVE AND OBJECTIVE BOX
Cc: Gait dysfunction    HPI: Patient with no new medical issues today.  Pain controlled, no chest pain, no N/V, no Fevers/Chills. No other new ROS  Has been tolerating rehabilitation program.  Appreciate Hospitalist evaluation.     MEDICATIONS  (STANDING):  ascorbic acid 500 milliGRAM(s) Oral daily  aspirin  chewable 81 milliGRAM(s) Oral daily  atorvastatin 40 milliGRAM(s) Oral at bedtime  dextrose 5%. 1000 milliLiter(s) (50 mL/Hr) IV Continuous <Continuous>  dextrose 50% Injectable 12.5 Gram(s) IV Push once  dextrose 50% Injectable 25 Gram(s) IV Push once  dextrose 50% Injectable 25 Gram(s) IV Push once  escitalopram 10 milliGRAM(s) Oral daily  famotidine    Tablet 20 milliGRAM(s) Oral two times a day  ferrous    sulfate 325 milliGRAM(s) Oral two times a day  furosemide    Tablet 20 milliGRAM(s) Oral daily  heparin  Injectable 5000 Unit(s) SubCutaneous every 8 hours  insulin glargine Injectable (LANTUS) 10 Unit(s) SubCutaneous at bedtime  insulin lispro (HumaLOG) corrective regimen sliding scale   SubCutaneous three times a day before meals  insulin lispro (HumaLOG) corrective regimen sliding scale   SubCutaneous at bedtime  metFORMIN 1000 milliGRAM(s) Oral two times a day  metoprolol tartrate 25 milliGRAM(s) Oral two times a day    MEDICATIONS  (PRN):  acetaminophen   Tablet .. 650 milliGRAM(s) Oral every 6 hours PRN Temp greater or equal to 38C (100.4F), Mild Pain (1 - 3)  dextrose 40% Gel 15 Gram(s) Oral once PRN Blood Glucose LESS THAN 70 milliGRAM(s)/deciliter  docusate sodium 100 milliGRAM(s) Oral two times a day PRN Constipation  glucagon  Injectable 1 milliGRAM(s) IntraMuscular once PRN Glucose LESS THAN 70 milligrams/deciliter  oxyCODONE    5 mG/acetaminophen 325 mG 1 Tablet(s) Oral every 6 hours PRN Moderate Pain (4 - 6)  oxyCODONE    5 mG/acetaminophen 325 mG 2 Tablet(s) Oral every 6 hours PRN Severe Pain (7 - 10)  polyethylene glycol 3350 17 Gram(s) Oral daily PRN Constipation  senna 2 Tablet(s) Oral at bedtime PRN Constipation    Vital Signs Last 24 Hrs  T(C): 36.7 (03 Aug 2019 21:54), Max: 36.7 (03 Aug 2019 21:54)  T(F): 98 (03 Aug 2019 21:54), Max: 98 (03 Aug 2019 21:54)  HR: 722 (03 Aug 2019 21:54) (722 - 722)  BP: 95/59 (03 Aug 2019 21:54) (95/59 - 95/59)  BP(mean): --  RR: 14 (03 Aug 2019 21:54) (14 - 14)  SpO2: 97% (03 Aug 2019 21:54) (97% - 97%)    In NAD  HEENT- EOMI  Heart- RRR, S1S2  Lungs- CTA bl.  Abd- + BS, NT  Ext- No calf pain  Neuro- Exam unchanged                          8.2    11.21 )-----------( 326      ( 04 Aug 2019 05:15 )             26.0     08-04    137  |  102  |  14  ----------------------------<  171<H>  4.1   |  28  |  0.70    Ca    8.9      04 Aug 2019 05:15  Mg     1.5     08-03    TPro  6.4  /  Alb  3.0<L>  /  TBili  0.8  /  DBili  x   /  AST  33  /  ALT  39  /  AlkPhos  53  08-03        Imp: Patient with diagnosis of debility admitted for comprehensive acute rehabilitation.    Plan:  - Continue PT/OT/SLP  - DVT prophylaxis- Heparin SQ  - Anemia- stable  - Skin- Turn q2h, check skin daily  - Continue current medications; patient medically stable.   - Patient is stable to continue current rehabilitation program. Cc: Gait dysfunction    HPI: Patient with no new medical issues today.  Pain controlled, no chest pain, no N/V, no Fevers/Chills. No other new ROS  Has been tolerating rehabilitation program.  Appreciate Hospitalist evaluation. \  Some incisional pain.     MEDICATIONS  (STANDING):  ascorbic acid 500 milliGRAM(s) Oral daily  aspirin  chewable 81 milliGRAM(s) Oral daily  atorvastatin 40 milliGRAM(s) Oral at bedtime  dextrose 5%. 1000 milliLiter(s) (50 mL/Hr) IV Continuous <Continuous>  dextrose 50% Injectable 12.5 Gram(s) IV Push once  dextrose 50% Injectable 25 Gram(s) IV Push once  dextrose 50% Injectable 25 Gram(s) IV Push once  escitalopram 10 milliGRAM(s) Oral daily  famotidine    Tablet 20 milliGRAM(s) Oral two times a day  ferrous    sulfate 325 milliGRAM(s) Oral two times a day  furosemide    Tablet 20 milliGRAM(s) Oral daily  heparin  Injectable 5000 Unit(s) SubCutaneous every 8 hours  insulin glargine Injectable (LANTUS) 10 Unit(s) SubCutaneous at bedtime  insulin lispro (HumaLOG) corrective regimen sliding scale   SubCutaneous three times a day before meals  insulin lispro (HumaLOG) corrective regimen sliding scale   SubCutaneous at bedtime  metFORMIN 1000 milliGRAM(s) Oral two times a day  metoprolol tartrate 25 milliGRAM(s) Oral two times a day    MEDICATIONS  (PRN):  acetaminophen   Tablet .. 650 milliGRAM(s) Oral every 6 hours PRN Temp greater or equal to 38C (100.4F), Mild Pain (1 - 3)  dextrose 40% Gel 15 Gram(s) Oral once PRN Blood Glucose LESS THAN 70 milliGRAM(s)/deciliter  docusate sodium 100 milliGRAM(s) Oral two times a day PRN Constipation  glucagon  Injectable 1 milliGRAM(s) IntraMuscular once PRN Glucose LESS THAN 70 milligrams/deciliter  oxyCODONE    5 mG/acetaminophen 325 mG 1 Tablet(s) Oral every 6 hours PRN Moderate Pain (4 - 6)  oxyCODONE    5 mG/acetaminophen 325 mG 2 Tablet(s) Oral every 6 hours PRN Severe Pain (7 - 10)  polyethylene glycol 3350 17 Gram(s) Oral daily PRN Constipation  senna 2 Tablet(s) Oral at bedtime PRN Constipation    Vital Signs Last 24 Hrs  T(C): 36.7 (03 Aug 2019 21:54), Max: 36.7 (03 Aug 2019 21:54)  T(F): 98 (03 Aug 2019 21:54), Max: 98 (03 Aug 2019 21:54)  HR: 722 (03 Aug 2019 21:54) (722 - 722)  BP: 95/59 (03 Aug 2019 21:54) (95/59 - 95/59)  BP(mean): --  RR: 14 (03 Aug 2019 21:54) (14 - 14)  SpO2: 97% (03 Aug 2019 21:54) (97% - 97%)    In NAD  HEENT- EOMI  Heart- RRR, S1S2  Lungs- CTA bl.  Abd- + BS, NT  Ext- No calf pain  Neuro- Exam unchanged                          8.2    11.21 )-----------( 326      ( 04 Aug 2019 05:15 )             26.0     08-04    137  |  102  |  14  ----------------------------<  171<H>  4.1   |  28  |  0.70    Ca    8.9      04 Aug 2019 05:15  Mg     1.5     08-03    TPro  6.4  /  Alb  3.0<L>  /  TBili  0.8  /  DBili  x   /  AST  33  /  ALT  39  /  AlkPhos  53  08-03        Imp: Patient with diagnosis of debility admitted for comprehensive acute rehabilitation.    Plan:  - Continue PT/OT/SLP  - DVT prophylaxis- Heparin SQ  - Anemia- stable  - Skin- Turn q2h, check skin daily  - Continue current medications; patient medically stable.   - Patient is stable to continue current rehabilitation program.

## 2019-08-04 NOTE — CHART NOTE - NSCHARTNOTEFT_GEN_A_CORE
Nutrition Follow Up Note  Hospital Course (Per Electronic Medical Record): 59 year old Greenlandic and English speaking female with PMH of HTN, HLD, DM2 who presented 07/30/19 with near syncope, found to have 95% left main and 95% RCA lesions now s/p CABG 07/27/19 with subsequent gait instability, ADL impairments and functional impairments. Pt requested dietitian d/t food preferences; per pt's son, pt would like oral supplement (i.e. ensure/glucerna); per initial dietitian assessment pt noted w/ fair PO intakes. Observed w/ 50% of lunch tray eaten. Pt would likely benefit from oral supplement d/t dietary restrictions and fair PO intakes. Pt and pt's family receptive to nutrition education provided on current diet.      Source: Medical Record [X] Patient [X] Family [X]         Diet: cons CHO (no snacks), DASH/TLC, halal    Enteral/Parenteral Nutrition: N/A    Current Weight: 127 lbs (8/4)  133 lbs (8/2)    Pertinent Medications: MEDICATIONS  (STANDING):  ascorbic acid 500 milliGRAM(s) Oral daily  aspirin  chewable 81 milliGRAM(s) Oral daily  atorvastatin 40 milliGRAM(s) Oral at bedtime  dextrose 5%. 1000 milliLiter(s) (50 mL/Hr) IV Continuous <Continuous>  dextrose 50% Injectable 12.5 Gram(s) IV Push once  dextrose 50% Injectable 25 Gram(s) IV Push once  dextrose 50% Injectable 25 Gram(s) IV Push once  escitalopram 10 milliGRAM(s) Oral daily  famotidine    Tablet 20 milliGRAM(s) Oral two times a day  ferrous    sulfate 325 milliGRAM(s) Oral two times a day  furosemide    Tablet 20 milliGRAM(s) Oral daily  heparin  Injectable 5000 Unit(s) SubCutaneous every 8 hours  insulin glargine Injectable (LANTUS) 10 Unit(s) SubCutaneous at bedtime  insulin lispro (HumaLOG) corrective regimen sliding scale   SubCutaneous three times a day before meals  insulin lispro (HumaLOG) corrective regimen sliding scale   SubCutaneous at bedtime  metFORMIN 1000 milliGRAM(s) Oral two times a day  metoprolol tartrate 25 milliGRAM(s) Oral two times a day    MEDICATIONS  (PRN):  acetaminophen   Tablet .. 650 milliGRAM(s) Oral every 6 hours PRN Temp greater or equal to 38C (100.4F), Mild Pain (1 - 3)  dextrose 40% Gel 15 Gram(s) Oral once PRN Blood Glucose LESS THAN 70 milliGRAM(s)/deciliter  docusate sodium 100 milliGRAM(s) Oral two times a day PRN Constipation  glucagon  Injectable 1 milliGRAM(s) IntraMuscular once PRN Glucose LESS THAN 70 milligrams/deciliter  oxyCODONE    5 mG/acetaminophen 325 mG 1 Tablet(s) Oral every 6 hours PRN Moderate Pain (4 - 6)  oxyCODONE    5 mG/acetaminophen 325 mG 2 Tablet(s) Oral every 6 hours PRN Severe Pain (7 - 10)  polyethylene glycol 3350 17 Gram(s) Oral daily PRN Constipation  senna 2 Tablet(s) Oral at bedtime PRN Constipation      Pertinent Labs:  08-04 Na137 mmol/L Glu 171 mg/dL<H> K+ 4.1 mmol/L Cr  0.70 mg/dL BUN 14 mg/dL 07-29 Phos 1.6 mg/dL<L> 08-03 Alb 3.0 g/dL<L> 07-26 [id number] 6.9 %<H> 07-26 Chol 128 mg/dL LDL 60 mg/dL HDL 62 mg/dL Trig 81 mg/dL        Skin: Intact per flowsheets    Edema: trace R leg    Last BM: on 8/2    Estimated Needs:   [X] No Change since Previous Assessment  [ ] Recalculated:     Previous Nutrition Diagnosis:   No Active Nutrition Dx     Nutrition Diagnosis is [ ] Ongoing    [ ] Resolved   [X] Not Applicable      New Nutrition Diagnosis: [ ] Not Applicable  [X] Inadequate Protein Energy Intake   [ ] Inadequate Oral Intake   [ ] Excessive Energy Intake   [ ] Increased Nutrient Needs   [ ] Obesity   [ ] Altered GI Function   [ ] Unintended Weight Loss   [ ] Food & Nutrition Related Knowledge Deficit  [ ] Limited Adherence to nutrition related recommendations   [ ] Malnutrition      Interventions:   1. Recommend Glucerna 240 ml PO QD (provides up to 220 kcal, 10 g protein)  2. Monitor PO intake and weight; pt on diuretics therefore weight changes/fluid shifts expected.    Monitoring & Evaluation:   [X] Weights   [X] PO Intake   [X] Follow Up (Per Protocol)  [X] Tolerance to Diet Prescription   [X] Other: Labs & PCOT    RD Remains Available.  Brenda Up RD

## 2019-08-04 NOTE — PROGRESS NOTE ADULT - ASSESSMENT
59 year old Faroese and English speaking female with PMH of HTN, HLD, DM2 who presented 07/30/19 with near syncope, found to have 95% left main and 95% RCA lesions now s/p CABG 07/27/19 with subsequent gait instability, ADL impairments and functional impairments- pt/ot/dvt ppx, pain meds, Lasix for 3 more days (end 8/4), continue with spironolactone for 3 more days (end on 8/5)    CAD:-ASA    HLD- Atorvastatin    HTN -Metoprolol, will decrease lasix to 20 mg daily, will stop aldactone since BP noted to be low this am.     Diabetes mellitus, type 2- c/w LANTUS, ISS, c/w metformin  Hemoglobin A1C, Whole Blood: 6.9    Leukocytosis: ? unclear etiology likely reactive, , cxr- no infiltrate 8/3/19, Pt afebrile, will repeat ua, will monitor     Anemia- likely post op blood loss anemia will monitor h/h, will start iron/vit c    Pain control -Tylenol PRN, Percocet PRN    Hyperlipidemia- Atorvastatin 40mg qhs    DVT prophylaxis-SubQ heparin    Bowel- prn Colace, Miralax    Mood-Lexapro    c/w current care, will follow

## 2019-08-05 ENCOUNTER — TRANSCRIPTION ENCOUNTER (OUTPATIENT)
Age: 59
End: 2019-08-05

## 2019-08-05 LAB
ANION GAP SERPL CALC-SCNC: 10 MMOL/L — SIGNIFICANT CHANGE UP (ref 5–17)
BASOPHILS # BLD AUTO: 0.07 K/UL — SIGNIFICANT CHANGE UP (ref 0–0.2)
BASOPHILS NFR BLD AUTO: 0.6 % — SIGNIFICANT CHANGE UP (ref 0–2)
BUN SERPL-MCNC: 12 MG/DL — SIGNIFICANT CHANGE UP (ref 7–23)
CALCIUM SERPL-MCNC: 8.7 MG/DL — SIGNIFICANT CHANGE UP (ref 8.4–10.5)
CHLORIDE SERPL-SCNC: 100 MMOL/L — SIGNIFICANT CHANGE UP (ref 96–108)
CO2 SERPL-SCNC: 28 MMOL/L — SIGNIFICANT CHANGE UP (ref 22–31)
CREAT SERPL-MCNC: 0.69 MG/DL — SIGNIFICANT CHANGE UP (ref 0.5–1.3)
CULTURE RESULTS: SIGNIFICANT CHANGE UP
EOSINOPHIL # BLD AUTO: 0.4 K/UL — SIGNIFICANT CHANGE UP (ref 0–0.5)
EOSINOPHIL NFR BLD AUTO: 3.5 % — SIGNIFICANT CHANGE UP (ref 0–6)
GLUCOSE SERPL-MCNC: 163 MG/DL — HIGH (ref 70–99)
HCT VFR BLD CALC: 27.2 % — LOW (ref 34.5–45)
HGB BLD-MCNC: 8.7 G/DL — LOW (ref 11.5–15.5)
IMM GRANULOCYTES NFR BLD AUTO: 6.1 % — HIGH (ref 0–1.5)
LYMPHOCYTES # BLD AUTO: 1.93 K/UL — SIGNIFICANT CHANGE UP (ref 1–3.3)
LYMPHOCYTES # BLD AUTO: 16.7 % — SIGNIFICANT CHANGE UP (ref 13–44)
MAGNESIUM SERPL-MCNC: 1.6 MG/DL — SIGNIFICANT CHANGE UP (ref 1.6–2.6)
MCHC RBC-ENTMCNC: 30.7 PG — SIGNIFICANT CHANGE UP (ref 27–34)
MCHC RBC-ENTMCNC: 32 GM/DL — SIGNIFICANT CHANGE UP (ref 32–36)
MCV RBC AUTO: 96.1 FL — SIGNIFICANT CHANGE UP (ref 80–100)
MONOCYTES # BLD AUTO: 1.43 K/UL — HIGH (ref 0–0.9)
MONOCYTES NFR BLD AUTO: 12.4 % — SIGNIFICANT CHANGE UP (ref 2–14)
NEUTROPHILS # BLD AUTO: 7.02 K/UL — SIGNIFICANT CHANGE UP (ref 1.8–7.4)
NEUTROPHILS NFR BLD AUTO: 60.7 % — SIGNIFICANT CHANGE UP (ref 43–77)
NRBC # BLD: 0 /100 WBCS — SIGNIFICANT CHANGE UP (ref 0–0)
PLATELET # BLD AUTO: 342 K/UL — SIGNIFICANT CHANGE UP (ref 150–400)
POTASSIUM SERPL-MCNC: 3.9 MMOL/L — SIGNIFICANT CHANGE UP (ref 3.5–5.3)
POTASSIUM SERPL-SCNC: 3.9 MMOL/L — SIGNIFICANT CHANGE UP (ref 3.5–5.3)
RBC # BLD: 2.83 M/UL — LOW (ref 3.8–5.2)
RBC # FLD: 15.9 % — HIGH (ref 10.3–14.5)
SODIUM SERPL-SCNC: 138 MMOL/L — SIGNIFICANT CHANGE UP (ref 135–145)
SPECIMEN SOURCE: SIGNIFICANT CHANGE UP
WBC # BLD: 11.56 K/UL — HIGH (ref 3.8–10.5)
WBC # FLD AUTO: 11.56 K/UL — HIGH (ref 3.8–10.5)

## 2019-08-05 PROCEDURE — 99232 SBSQ HOSP IP/OBS MODERATE 35: CPT

## 2019-08-05 PROCEDURE — 99233 SBSQ HOSP IP/OBS HIGH 50: CPT

## 2019-08-05 RX ORDER — FAMOTIDINE 10 MG/ML
20 INJECTION INTRAVENOUS DAILY
Refills: 0 | Status: DISCONTINUED | OUTPATIENT
Start: 2019-08-05 | End: 2019-08-17

## 2019-08-05 RX ORDER — METOPROLOL TARTRATE 50 MG
12.5 TABLET ORAL
Refills: 0 | Status: DISCONTINUED | OUTPATIENT
Start: 2019-08-05 | End: 2019-08-09

## 2019-08-05 RX ORDER — PANTOPRAZOLE SODIUM 20 MG/1
40 TABLET, DELAYED RELEASE ORAL
Refills: 0 | Status: DISCONTINUED | OUTPATIENT
Start: 2019-08-05 | End: 2019-08-17

## 2019-08-05 RX ADMIN — Medication 2: at 07:32

## 2019-08-05 RX ADMIN — OXYCODONE AND ACETAMINOPHEN 1 TABLET(S): 5; 325 TABLET ORAL at 09:10

## 2019-08-05 RX ADMIN — Medication 20 MILLIGRAM(S): at 05:32

## 2019-08-05 RX ADMIN — OXYCODONE AND ACETAMINOPHEN 2 TABLET(S): 5; 325 TABLET ORAL at 23:00

## 2019-08-05 RX ADMIN — OXYCODONE AND ACETAMINOPHEN 2 TABLET(S): 5; 325 TABLET ORAL at 22:04

## 2019-08-05 RX ADMIN — Medication 1: at 11:37

## 2019-08-05 RX ADMIN — Medication 325 MILLIGRAM(S): at 05:32

## 2019-08-05 RX ADMIN — METFORMIN HYDROCHLORIDE 1000 MILLIGRAM(S): 850 TABLET ORAL at 17:08

## 2019-08-05 RX ADMIN — Medication 1: at 17:08

## 2019-08-05 RX ADMIN — ESCITALOPRAM OXALATE 10 MILLIGRAM(S): 10 TABLET, FILM COATED ORAL at 11:10

## 2019-08-05 RX ADMIN — OXYCODONE AND ACETAMINOPHEN 1 TABLET(S): 5; 325 TABLET ORAL at 19:29

## 2019-08-05 RX ADMIN — INSULIN GLARGINE 10 UNIT(S): 100 INJECTION, SOLUTION SUBCUTANEOUS at 22:04

## 2019-08-05 RX ADMIN — Medication 325 MILLIGRAM(S): at 17:14

## 2019-08-05 RX ADMIN — Medication 650 MILLIGRAM(S): at 06:21

## 2019-08-05 RX ADMIN — OXYCODONE AND ACETAMINOPHEN 1 TABLET(S): 5; 325 TABLET ORAL at 07:35

## 2019-08-05 RX ADMIN — FAMOTIDINE 20 MILLIGRAM(S): 10 INJECTION INTRAVENOUS at 05:32

## 2019-08-05 RX ADMIN — HEPARIN SODIUM 5000 UNIT(S): 5000 INJECTION INTRAVENOUS; SUBCUTANEOUS at 13:21

## 2019-08-05 RX ADMIN — ATORVASTATIN CALCIUM 40 MILLIGRAM(S): 80 TABLET, FILM COATED ORAL at 22:04

## 2019-08-05 RX ADMIN — OXYCODONE AND ACETAMINOPHEN 1 TABLET(S): 5; 325 TABLET ORAL at 08:17

## 2019-08-05 RX ADMIN — Medication 25 MILLIGRAM(S): at 05:32

## 2019-08-05 RX ADMIN — HEPARIN SODIUM 5000 UNIT(S): 5000 INJECTION INTRAVENOUS; SUBCUTANEOUS at 22:03

## 2019-08-05 RX ADMIN — Medication 650 MILLIGRAM(S): at 05:32

## 2019-08-05 RX ADMIN — Medication 81 MILLIGRAM(S): at 11:09

## 2019-08-05 RX ADMIN — Medication 500 MILLIGRAM(S): at 11:09

## 2019-08-05 RX ADMIN — METFORMIN HYDROCHLORIDE 1000 MILLIGRAM(S): 850 TABLET ORAL at 07:32

## 2019-08-05 RX ADMIN — OXYCODONE AND ACETAMINOPHEN 1 TABLET(S): 5; 325 TABLET ORAL at 13:21

## 2019-08-05 RX ADMIN — HEPARIN SODIUM 5000 UNIT(S): 5000 INJECTION INTRAVENOUS; SUBCUTANEOUS at 05:32

## 2019-08-05 NOTE — DISCHARGE NOTE PROVIDER - NSDCACTIVITY_GEN_ALL_CORE
Showering allowed/Walking - Indoors allowed/No heavy lifting/straining/Stairs allowed/Do not drive or operate machinery/Walking - Outdoors allowed

## 2019-08-05 NOTE — DISCHARGE NOTE PROVIDER - CARE PROVIDERS DIRECT ADDRESSES
[email or web address]

## 2019-08-05 NOTE — PROGRESS NOTE ADULT - ASSESSMENT
59 year old Welsh and English speaking female with PMH of HTN, HLD, DM2 who presented 07/30/19 with near syncope, found to have 95% left main and 95% RCA lesions now s/p CABG 07/27/19 with subsequent gait instability, ADL impairments and functional impairments- pt/ot/dvt ppx, pain meds, Lasix for 3 more days (end 8/4), continue with spironolactone for 3 more days (end on 8/5)    CAD:-ASA    HLD- Atorvastatin    HTN -Metoprolol, will decrease lasix to 20 mg daily, will stop aldactone since BP noted to be low this am.     Diabetes mellitus, type 2- controlled;  c/w LANTUS, ISS, c/w metformin  Hemoglobin A1C, Whole Blood: 6.9    Leukocytosis: ? unclear etiology likely reactive, ,Reviewed cxr on 8/3/19 - no infiltrate, Pt afebrile,   Reviwed results of UA - no growth; discussed with PM&R team     Anemia- likely post op blood loss anemia will monitor h/h, will start iron/vit c    Pain control -Tylenol PRN, Percocet PRN    Hyperlipidemia- Atorvastatin 40mg qhs    DVT prophylaxis-SubQ heparin    Bowel- prn Colace, Miralax    Mood-Lexapro    c/w current care, will follow

## 2019-08-05 NOTE — PROGRESS NOTE ADULT - CARDIOVASCULAR COMMENTS
+midsternal incision site, C/D/I, healing well, skin well approximated. No erythema, swelling or discharge. +chest tube sites healed, C/D/I. +midsternal incision site, C/D/I, healing well, skin well approximated. +prineotape.  No erythema, swelling or discharge. +chest tube sites healed, C/D/I.. +old drain site with suture midline

## 2019-08-05 NOTE — PROGRESS NOTE ADULT - EXTREMITIES COMMENTS
+RLE saphenous vein harvest site incision C/D/I. skin well approximated. No erythema. Mild tenderness to palpation    BL LE: no calf tenderness

## 2019-08-05 NOTE — PROGRESS NOTE ADULT - ASSESSMENT
ASSESSMENT/PLAN  Pt is a 59 year old Khmer and English speaking female with PMH of HTN, HLD, DM2 who presented 07/30/19 with near syncope, found to have 95% left main and 95% RCA lesions now s/p CABG 07/27/19 with subsequent gait instability, ADL impairments and functional impairments.    #S/p CABG 7/27/19  -Continue with comprehensive rehab program  -Continue with BP and CAD management as below  -Hospitalist c/s, recs appreciated: lasix decreased to 20mg 8/4, spironolactone dc'd d/t hypotension 8/4  -Incentive spirometry q2h  -Strict intake/output, daily weights	  -Outpt f/u with Dr. Pallazo    #CAD:  -ASA  -Atorvastatin 40mg qhs  -Metoprolol 25mg BID  -Cardiology oupt f/u with Dr. Juárez     #HTN  -Metoprolol 25mg BID  -Furosemide 20mg PO daily    #Diabetes mellitus, type II  -Humalog 2u pre-meal  -Metformin 1000mg PO bid  -ISS  -Hospitalist consult    #Leukocytosis: Pt afebrile   - 08/02: 11.4, (08/01: 11.3; 07/31: 8.2)  - 8/5: 11.56  - UA: >3 organisms, likely contaminant  - 8/8 CBC    #Anemia, stable  -08/05: 8.7 (08/01: 9.8; 07/31: 8.7)  -Monitor CBC    #Pain control  -Tylenol PRN, Percocet PRN    #Hyperlipidemia  -Atorvastatin 40mg qhs    #GERD  - Protonix  - Pepcid    #DVT prophylaxis  -SubQ heparin  -SCDs, TEDs    #Bowel  -Colace, Miralax    #Mood  -Lexapro    #Nutrition  -Regular diet Consistent carb/no snack    #FEN   - Diet - CCHO      Precautions / PROPHYLAXIS:   - Fall precautions, Sternal precautions, Cardiac precautions  - ortho: Weight bearing status: WBAT  - Lungs: Aspiration, Incentive Spirometer   - Pressure injury/Skin: Turn Q2hrs while in bed, OOB to Chair, PT/OT    - DVT: Heparin, SCDs, TEDs ASSESSMENT/PLAN  Pt is a 59 year old Slovak and English speaking female with PMH of HTN, HLD, DM2 who presented 07/30/19 with near syncope, found to have 95% left main and 95% RCA lesions now s/p CABG 07/27/19 with subsequent gait instability, ADL impairments and functional impairments.    #S/p CABG 7/27/19  -Continue with comprehensive rehab program  -Continue with BP and CAD management as below  -Hospitalist c/s, recs appreciated: lasix decreased to 20mg 8/4, spironolactone dc'd d/t hypotension 8/4. monitor vitals  -Incentive spirometry q2h. Patient encouraged to use  -Strict intake/output, daily weights	  -Outpt f/u with Dr. Pallazo    #CAD:  -ASA  -Atorvastatin 40mg qhs  -Metoprolol 25mg BID  -Cardiology oupt f/u with Dr. Juárez     #HTN  -Metoprolol 25mg BID  -Furosemide 20mg PO daily    #Diabetes mellitus, type II  -Humalog 2u pre-meal  -Metformin 1000mg PO bid  -ISS  -hospitlalist and nutrition consults    #Leukocytosis: Pt afebrile   - 08/02: 11.4, (08/01: 11.3; 07/31: 8.2)  8/5: 11.56  - UA: >3 organisms, likely contaminant  - 8/8 CBC    #Anemia, stable  -08/05: 8.7 (08/01: 9.8; 07/31: 8.7)  -Monitor CBC    #Pain control  -Tylenol PRN, Percocet PRN    #Hyperlipidemia  -Atorvastatin 40mg qhs    #GERD  - Protonix  -add simethicone    #DVT prophylaxis  -SubQ heparin  -SCDs, TEDs    #Bowel  -Colace, Miralax    #Mood  -Lexapro    #Nutrition  -Regular diet Consistent carb/no snack    #FEN   - Diet - CCHO        -#DVT PPx:   Heparin, SCDs, TEDs    LABS;  CBC 8/8

## 2019-08-05 NOTE — PROGRESS NOTE ADULT - GENERAL COMMENTS
NAD, resting comfortably in WC. Denies CP, palpitations, SOB. Only has mild SOB with prolonged conversing or on exertion. Encouraged to use incentive spirometer. Vanessa : Kendall #[id number] Seen with language line . NAD, resting comfortably in WC. Denies CP, palpitations, SOB. Only has mild SOB with prolonged conversing or on exertion. Encouraged to use incentive spirometer. Vanessa : Kendall #[id number]

## 2019-08-05 NOTE — PROGRESS NOTE ADULT - SUBJECTIVE AND OBJECTIVE BOX
Patient is a 59y old  Female who presents with a chief complaint of gait instability, ADL impairments and functional impairments s/p CABG 19 (04 Aug 2019 10:14)      HPI:  Pt is a 59 year old Yoruba and English speaking female with PMH of HTN, HLD, DM2 who presented to [facility] ED  with near syncope after arguing with her daughter. Patient also had acute onset nausea, diaphoresis, palpitations, dizziness, and mild chest pressure. NO chest pain, SOB, AGARWAL, PND, orthopnea, increased lower extremity edema, fever/chills, cough. The patient had cath at [facility] which showed 95% left main and 95% RCA lesions and she was transferred to [facility] for CABG on  with Dr. Ramey. Hospital course sig. for chest tube placement (DC'd ), RLE ghassan (DC'd ), small R thigh hematoma () and afebrile leukocytosis to 11 (). Admitted to [facility] for gait instability, ADL impairments and functional impairments s/p CABG. (02 Aug 2019 12:13)      SUBJECTIVE: Patient seen and examined. No acute overnight events. Patient states that overall, pain at surgical incision sites is well controlled. Has some mild discomfort at incision site with deep breath in. Patient states that prior to this past year, she was able to walk for 45min for exercise. Over the past year, she found that she would have to take breaks every 15-20 minutes due to decreased endurance and some SOB. Currently complains of some burning epigastric pain that worsens with spicy meals and/or stress. Has known hx of GERD and states symptoms are the same. Otherwise denies CP, palpitations, SOB, N/V, F/C, HA or dizziness.   Yoruba : Kendall #[id number]    PAST MEDICAL & SURGICAL HISTORY:  Gall stone pancreatitis: S/P laparoscopic cholecystectomy  Low back pain  Diabetes mellitus, type II  Benign hypertension  History of cholecystectomy: laparoscopic cholecystectomy  H/O:       MEDICATIONS  (STANDING):  ascorbic acid 500 milliGRAM(s) Oral daily  aspirin  chewable 81 milliGRAM(s) Oral daily  atorvastatin 40 milliGRAM(s) Oral at bedtime  dextrose 5%. 1000 milliLiter(s) (50 mL/Hr) IV Continuous <Continuous>  dextrose 50% Injectable 12.5 Gram(s) IV Push once  dextrose 50% Injectable 25 Gram(s) IV Push once  dextrose 50% Injectable 25 Gram(s) IV Push once  escitalopram 10 milliGRAM(s) Oral daily  famotidine    Tablet 20 milliGRAM(s) Oral two times a day  ferrous    sulfate 325 milliGRAM(s) Oral two times a day  furosemide    Tablet 20 milliGRAM(s) Oral daily  heparin  Injectable 5000 Unit(s) SubCutaneous every 8 hours  insulin glargine Injectable (LANTUS) 10 Unit(s) SubCutaneous at bedtime  insulin lispro (HumaLOG) corrective regimen sliding scale   SubCutaneous three times a day before meals  insulin lispro (HumaLOG) corrective regimen sliding scale   SubCutaneous at bedtime  metFORMIN 1000 milliGRAM(s) Oral two times a day  metoprolol tartrate 25 milliGRAM(s) Oral two times a day    MEDICATIONS  (PRN):  acetaminophen   Tablet .. 650 milliGRAM(s) Oral every 6 hours PRN Temp greater or equal to 38C (100.4F), Mild Pain (1 - 3)  dextrose 40% Gel 15 Gram(s) Oral once PRN Blood Glucose LESS THAN 70 milliGRAM(s)/deciliter  docusate sodium 100 milliGRAM(s) Oral two times a day PRN Constipation  glucagon  Injectable 1 milliGRAM(s) IntraMuscular once PRN Glucose LESS THAN 70 milligrams/deciliter  oxyCODONE    5 mG/acetaminophen 325 mG 1 Tablet(s) Oral every 6 hours PRN Moderate Pain (4 - 6)  oxyCODONE    5 mG/acetaminophen 325 mG 2 Tablet(s) Oral every 6 hours PRN Severe Pain (7 - 10)  polyethylene glycol 3350 17 Gram(s) Oral daily PRN Constipation  senna 2 Tablet(s) Oral at bedtime PRN Constipation      Allergies    metronidazole (Rash)    Intolerances    Norvasc (Swelling)        VITALS  59y  Vital Signs Last 24 Hrs  T(C): 36.4 (05 Aug 2019 08:41), Max: 36.5 (04 Aug 2019 21:48)  T(F): 97.6 (05 Aug 2019 08:41), Max: 97.7 (04 Aug 2019 21:48)  HR: 59 (05 Aug 2019 08:41) (59 - 69)  BP: 98/61 (05 Aug 2019 08:41) (98/61 - 99/63)  BP(mean): --  RR: 14 (05 Aug 2019 08:41) (14 - 14)  SpO2: 96% (05 Aug 2019 08:41) (96% - 98%)  Daily     Daily         RECENT LABS:                          8.7    11.56 )-----------( 342      ( 05 Aug 2019 05:55 )             27.2         138  |  100  |  12  ----------------------------<  163<H>  3.9   |  28  |  0.69    Ca    8.7      05 Aug 2019 05:55  Mg     1.6     08              Culture - Urine (collected 19 @ 19:24)  Source: .Urine  Final Report (19 @ 16:58):    >=3 organisms. Probable collection contamination.        CAPILLARY BLOOD GLUCOSE      POCT Blood Glucose.: 216 mg/dL (05 Aug 2019 07:29)  POCT Blood Glucose.: 157 mg/dL (04 Aug 2019 21:23)  POCT Blood Glucose.: 207 mg/dL (04 Aug 2019 16:53)  POCT Blood Glucose.: 148 mg/dL (04 Aug 2019 11:38) Patient is a 59y old  Female who presents with a chief complaint of gait instability, ADL impairments and functional impairments s/p CABG 19 (04 Aug 2019 10:14)      HPI:  Pt is a 59 year old Slovak and English speaking female with PMH of HTN, HLD, DM2 who presented to [facility] ED  with near syncope after arguing with her daughter. Patient also had acute onset nausea, diaphoresis, palpitations, dizziness, and mild chest pressure. NO chest pain, SOB, AGARWAL, PND, orthopnea, increased lower extremity edema, fever/chills, cough. The patient had cath at [facility] which showed 95% left main and 95% RCA lesions and she was transferred to [facility] for CABG on  with Dr. Ramey. Hospital course sig. for chest tube placement (DC'd ), RLE ghassan (DC'd ), small R thigh hematoma () and afebrile leukocytosis to 11 (). Admitted to [facility] for gait instability, ADL impairments and functional impairments s/p CABG. (02 Aug 2019 12:13)      SUBJECTIVE: Patient seen and examined. No acute overnight events. Patient states that overall, pain at surgical incision sites is well controlled. Has some mild discomfort at incision site with deep breath in. Patient states that prior to this past year, she was able to walk for 45min for exercise. Over the past year, she found that she would have to take breaks every 15-20 minutes due to decreased endurance and some SOB. Currently complains of some burning epigastric pain that worsens with spicy meals and/or stress. Has known hx of GERD and states symptoms are the same. Otherwise denies CP, palpitations, SOB, N/V, F/C, HA or dizziness.   Slovak : Kendall #[id number]    PAST MEDICAL & SURGICAL HISTORY:  Gall stone pancreatitis: S/P laparoscopic cholecystectomy  Low back pain  Diabetes mellitus, type II  Benign hypertension  History of cholecystectomy: laparoscopic cholecystectomy  H/O:       MEDICATIONS  (STANDING):  ascorbic acid 500 milliGRAM(s) Oral daily  aspirin  chewable 81 milliGRAM(s) Oral daily  atorvastatin 40 milliGRAM(s) Oral at bedtime  dextrose 5%. 1000 milliLiter(s) (50 mL/Hr) IV Continuous <Continuous>  dextrose 50% Injectable 12.5 Gram(s) IV Push once  dextrose 50% Injectable 25 Gram(s) IV Push once  dextrose 50% Injectable 25 Gram(s) IV Push once  escitalopram 10 milliGRAM(s) Oral daily  famotidine    Tablet 20 milliGRAM(s) Oral two times a day  ferrous    sulfate 325 milliGRAM(s) Oral two times a day  furosemide    Tablet 20 milliGRAM(s) Oral daily  heparin  Injectable 5000 Unit(s) SubCutaneous every 8 hours  insulin glargine Injectable (LANTUS) 10 Unit(s) SubCutaneous at bedtime  insulin lispro (HumaLOG) corrective regimen sliding scale   SubCutaneous three times a day before meals  insulin lispro (HumaLOG) corrective regimen sliding scale   SubCutaneous at bedtime  metFORMIN 1000 milliGRAM(s) Oral two times a day  metoprolol tartrate 25 milliGRAM(s) Oral two times a day    MEDICATIONS  (PRN):  acetaminophen   Tablet .. 650 milliGRAM(s) Oral every 6 hours PRN Temp greater or equal to 38C (100.4F), Mild Pain (1 - 3)  dextrose 40% Gel 15 Gram(s) Oral once PRN Blood Glucose LESS THAN 70 milliGRAM(s)/deciliter  docusate sodium 100 milliGRAM(s) Oral two times a day PRN Constipation  glucagon  Injectable 1 milliGRAM(s) IntraMuscular once PRN Glucose LESS THAN 70 milligrams/deciliter  oxyCODONE    5 mG/acetaminophen 325 mG 1 Tablet(s) Oral every 6 hours PRN Moderate Pain (4 - 6)  oxyCODONE    5 mG/acetaminophen 325 mG 2 Tablet(s) Oral every 6 hours PRN Severe Pain (7 - 10)  polyethylene glycol 3350 17 Gram(s) Oral daily PRN Constipation  senna 2 Tablet(s) Oral at bedtime PRN Constipation      Allergies    metronidazole (Rash)    Intolerances    Norvasc (Swelling)        VITALS  59y  Vital Signs Last 24 Hrs  T(C): 36.4 (05 Aug 2019 08:41), Max: 36.5 (04 Aug 2019 21:48)  T(F): 97.6 (05 Aug 2019 08:41), Max: 97.7 (04 Aug 2019 21:48)  HR: 59 (05 Aug 2019 08:41) (59 - 69)  BP: 98/61 (05 Aug 2019 08:41) (98/61 - 99/63)  BP(mean): --  RR: 14 (05 Aug 2019 08:41) (14 - 14)  SpO2: 96% (05 Aug 2019 08:41) (96% - 98%)  Daily     Daily         RECENT LABS:                          8.7    11.56 )-----------( 342      ( 05 Aug 2019 05:55 )             27.2         138  |  100  |  12  ----------------------------<  163<H>  3.9   |  28  |  0.69    Ca    8.7      05 Aug 2019 05:55  Mg     1.6     08              Culture - Urine (collected 19 @ 19:24)  Source: .Urine  Final Report (19 @ 16:58):    >=3 organisms. Probable collection contamination.        CAPILLARY BLOOD GLUCOSE      POCT Blood Glucose.: 216 mg/dL (05 Aug 2019 07:29)  POCT Blood Glucose.: 157 mg/dL (04 Aug 2019 21:23)  POCT Blood Glucose.: 207 mg/dL (04 Aug 2019 16:53)  POCT Blood Glucose.: 148 mg/dL (04 Aug 2019 11:38)

## 2019-08-05 NOTE — DISCHARGE NOTE PROVIDER - NSDCHHHOMEBOUND_GEN_ALL_CORE
Other, specify... Other, specify.../Chest pain/weakness during/after ambulation   greater than 20 feet

## 2019-08-05 NOTE — DISCHARGE NOTE PROVIDER - CARE PROVIDER_API CALL
Judah Ramey)  Thoracic and Cardiac Surgery  [address] [place] [address]  Phone: [phone]  Fax: [phone]  Follow Up Time: Judah Ramey)  Thoracic and Cardiac Surgery  [address] [place] [address]  Phone: [phone]  Fax: [phone]  Follow Up Time:     Katarina Owens)  Cardiovascular Disease; Internal Medicine  [address] [facility], [address]  [place] [address]  Phone: [phone]  Fax: [phone]  Follow Up Time:     Robina Puente)  Brain Injury Medicine; PhysicalRehab Medicine  [address] [place] [address]  Phone: [phone]  Fax: [phone]  Follow Up Time: 1 month

## 2019-08-05 NOTE — PROGRESS NOTE ADULT - SUBJECTIVE AND OBJECTIVE BOX
Patient is a 59y old  Female who presents with a chief complaint of gait instability, ADL impairments and functional impairments s/p CABG 19 (05 Aug 2019 11:17)      Patient seen and examined at bedside, n events overnight, in no acute distress.     ALLERGIES:  metronidazole (Rash)  Norvasc (Swelling)    MEDICATIONS:  ascorbic acid 500 milliGRAM(s) Oral daily  docusate sodium 100 milliGRAM(s) Oral two times a day PRN  famotidine    Tablet 20 milliGRAM(s) Oral daily PRN  ferrous    sulfate 325 milliGRAM(s) Oral two times a day  furosemide    Tablet 20 milliGRAM(s) Oral daily  insulin glargine Injectable (LANTUS) 10 Unit(s) SubCutaneous at bedtime  metoprolol tartrate 12.5 milliGRAM(s) Oral two times a day  pantoprazole    Tablet 40 milliGRAM(s) Oral before breakfast  polyethylene glycol 3350 17 Gram(s) Oral daily PRN    Vital Signs Last 24 Hrs  T(C): 36.4 (05 Aug 2019 08:41), Max: 36.5 (04 Aug 2019 21:48)  T(F): 97.6 (05 Aug 2019 08:41), Max: 97.7 (04 Aug 2019 21:48)  HR: 59 (05 Aug 2019 08:41) (59 - 69)  BP: 98/61 (05 Aug 2019 08:41) (98/61 - 99/63)  BP(mean): --  RR: 14 (05 Aug 2019 08:41) (14 - 14)  SpO2: 96% (05 Aug 2019 08:41) (96% - 98%)  I&O's Summary    04 Aug 2019 07:01  -  05 Aug 2019 07:00  --------------------------------------------------------  IN: 0 mL / OUT: 2 mL / NET: -2 mL          PAST MEDICAL & SURGICAL HISTORY:  Gall stone pancreatitis: S/P laparoscopic cholecystectomy  Low back pain  Diabetes mellitus, type II  Benign hypertension  History of cholecystectomy: laparoscopic cholecystectomy  H/O:       Home Medications:  Aspirin Enteric Coated 81 mg oral delayed release tablet: 1 tab(s) orally once a day (2019 12:18)  atorvastatin 40 mg oral tablet: 1 tab(s) orally once a day (at bedtime) (02 Aug 2019 11:50)  docusate sodium 100 mg oral capsule: 1 cap(s) orally 3 times a day (02 Aug 2019 11:50)  escitalopram 10 mg oral tablet: 1 tab(s) orally once a day (2019 12:18)  famotidine 20 mg oral tablet: 1 tab(s) orally 2 times a day (02 Aug 2019 11:50)  furosemide 40 mg oral tablet: 1 tab(s) orally once a day for 3 more days end on  (02 Aug 2019 11:50)  heparin: 5000 unit(s) subcutaneous every 8 hours (02 Aug 2019 11:50)  HumaLOG 100 units/mL subcutaneous solution: 2 unit(s) subcutaneous 3 times a day (before meals) (02 Aug 2019 11:50)  metFORMIN 1000 mg oral tablet: 1 tab(s) orally 2 times a day (2019 12:18)  metoprolol tartrate 25 mg oral tablet: 1 tab(s) orally 2 times a day (02 Aug 2019 11:50)  oxycodone-acetaminophen 5 mg-325 mg oral tablet: 1 tab(s) orally every 6 hours, As needed, Moderate Pain (4 - 6) (02 Aug 2019 11:49)  polyethylene glycol 3350 oral powder for reconstitution: 17 gram(s) orally once a day (02 Aug 2019 11:50)  spironolactone 25 mg oral tablet: 1 tab(s) orally once a day for 3 more days end on  (02 Aug 2019 11:50)      PHYSICAL EXAM:  General: NAD, A/O x3  ENT: MMM  Neck: Supple, No JVD  Lungs: Clear to percussion bilaterally  Cardio: RRR, S1/S2, No murmurs  Abdomen: Soft, Nontender, Nondistended; Bowel sounds present  neuro: no new deficits   Extremities: No clubbing, cyanosis, or edema    LABS:                        8.7    11.56 )-----------( 342      ( 05 Aug 2019 05:55 )             27.2     08-05    138  |  100  |  12  ----------------------------<  163  3.9   |  28  |  0.69    Ca    8.7      05 Aug 2019 05:55  Mg     1.6         TPro  6.4  /  Alb  3.0  /  TBili  0.8  /  DBili  x   /  AST  33  /  ALT  39  /  AlkPhos  53   Chol 128 mg/dL LDL 60 mg/dL HDL 62 mg/dL Trig 81 mg/dL    POCT Blood Glucose.: 174 mg/dL (05 Aug 2019 11:34)  POCT Blood Glucose.: 216 mg/dL (05 Aug 2019 07:29)  POCT Blood Glucose.: 157 mg/dL (04 Aug 2019 21:23)  POCT Blood Glucose.: 207 mg/dL (04 Aug 2019 16:53)     [id number] 6.9    Culture - Urine (collected 04 Aug 2019 11:30)  Source: .Urine Clean Catch (Midstream)  Final Report (05 Aug 2019 12:45):    <10,000 CFU/mL Normal Urogenital Lydia    Culture - Urine (collected 01 Aug 2019 19:24)  Source: .Urine  Final Report (02 Aug 2019 16:58):    >=3 organisms. Probable collection contamination.    RADIOLOGY & ADDITIONAL TESTS: no new tests     Care Discussed with Consultants/Other Providers: PM&R team

## 2019-08-05 NOTE — DISCHARGE NOTE PROVIDER - HOSPITAL COURSE
HPI:    Pt is a 59 year old German and English speaking female with PMH of HTN, HLD, DM2 who presented to [facility] ED 7/25 with near syncope after arguing with her daughter. Patient also had acute onset nausea, diaphoresis, palpitations, dizziness, and mild chest pressure. NO chest pain, SOB, AGARWAL, PND, orthopnea, increased lower extremity edema, fever/chills, cough. The patient had cath at [facility] which showed 95% left main and 95% RCA lesions and she was transferred to [facility] for CABG on 7/27 with Dr. Ramey. Hospital course sig. for chest tube placement (DC'd 7/30), RLE ghassan (DC'd 7/30), small R thigh hematoma (8/1) and afebrile leukocytosis to 11 (8/1). Admitted to [facility] for gait instability, ADL impairments and functional impairments s/p CABG. (03 Aug 2019 12:13)        Pt was stable for rehab admission on 8/3 to  [facility] for comprehensive OT and PT.         Pt’s rehab course was significant for          Pt tolerated course of inpatient PT/OT rehab with significant functional improvements.         Pt was medically cleared on 8/ for discharged to home with home care.         Pt will follow up with cardiology, CTS and PCP upon discharge. HPI:    Pt is a 59 year old Greenlandic and English speaking female with PMH of HTN, HLD, DM2 who presented to [facility] ED 7/25 with near syncope after arguing with her daughter. Patient also had acute onset nausea, diaphoresis, palpitations, dizziness, and mild chest pressure. NO chest pain, SOB, AGARWAL, PND, orthopnea, increased lower extremity edema, fever/chills, cough. The patient had cath at [facility] which showed 95% left main and 95% RCA lesions and she was transferred to [facility] for CABG on 7/27 with Dr. Ramey. Hospital course sig. for chest tube placement (DC'd 7/30), RLE ghassan (DC'd 7/30), small R thigh hematoma (8/1) and afebrile leukocytosis to 11 (8/1). Admitted to [facility] for gait instability, ADL impairments and functional impairments s/p CABG. (02 Aug 2019 12:13)        Pt was stable for rehab admission on 8/2 to  [facility] for comprehensive OT and PT.         Pt’s rehab course was significant for          Pt tolerated course of inpatient PT/OT rehab with significant functional improvements.         Pt was medically cleared on 8/ for discharged to home with home care.         Pt will follow up with cardiology, CTS and PCP upon discharge. Pt is a 59 year old Kinyarwanda and English speaking female with PMH of HTN, HLD, DM2 who presented to [facility] ED 7/25 with near syncope after arguing with her daughter. Patient also had acute onset nausea, diaphoresis, palpitations, dizziness, and mild chest pressure. NO chest pain, SOB, AGARWAL, PND, orthopnea, increased lower extremity edema, fever/chills, cough. The patient had cath at [facility] which showed 95% left main and 95% RCA lesions and she was transferred to [facility] for CABG on 7/27 with Dr. Ramey. Hospital course sig. for chest tube placement (DC'd 7/30), RLE ghassan (DC'd 7/30), small R thigh hematoma (8/1) and afebrile leukocytosis to 11 (8/1). Admitted to [facility] for gait instability, ADL impairments and functional impairments s/p CABG. (02 Aug 2019 12:13)        Pt was stable for rehab admission on 8/2 to [facility] for comprehensive OT and PT.         Pt’s rehab course was significant for episodic chest heaviness and pain surrounding the sternal incision site that was reproducible to palpation. EKG was unremarkable and troponin was <0.017. Symptoms resolved over the course of rehab stay. Patient with asymptomatic hypotension and bradycardia. Spironolactone and Furosemide were discontinued per CTS recommendations and Metoprolol was downtitrated to 12.5mg once daily, in concordance with [facility] NP recommendations.         Pt tolerated course of inpatient PT/OT rehab with significant functional improvements. Pt was medically cleared on 8/16/19 for discharged to home with home care. Pt will follow up with cardiology, CTS and PCP upon discharge.

## 2019-08-05 NOTE — DISCHARGE NOTE PROVIDER - PROVIDER TOKENS
PROVIDER:[TOKEN:[[id number]]] PROVIDER:[TOKEN:[[id number]]],PROVIDER:[TOKEN:[[id number]]],PROVIDER:[TOKEN:[[id number]],FOLLOWUP:[1 month]]

## 2019-08-06 PROCEDURE — 99233 SBSQ HOSP IP/OBS HIGH 50: CPT

## 2019-08-06 PROCEDURE — 99232 SBSQ HOSP IP/OBS MODERATE 35: CPT

## 2019-08-06 RX ADMIN — OXYCODONE AND ACETAMINOPHEN 1 TABLET(S): 5; 325 TABLET ORAL at 21:28

## 2019-08-06 RX ADMIN — Medication 325 MILLIGRAM(S): at 05:36

## 2019-08-06 RX ADMIN — PANTOPRAZOLE SODIUM 40 MILLIGRAM(S): 20 TABLET, DELAYED RELEASE ORAL at 06:00

## 2019-08-06 RX ADMIN — HEPARIN SODIUM 5000 UNIT(S): 5000 INJECTION INTRAVENOUS; SUBCUTANEOUS at 05:35

## 2019-08-06 RX ADMIN — METFORMIN HYDROCHLORIDE 1000 MILLIGRAM(S): 850 TABLET ORAL at 17:32

## 2019-08-06 RX ADMIN — Medication 3: at 11:29

## 2019-08-06 RX ADMIN — OXYCODONE AND ACETAMINOPHEN 1 TABLET(S): 5; 325 TABLET ORAL at 13:45

## 2019-08-06 RX ADMIN — ATORVASTATIN CALCIUM 40 MILLIGRAM(S): 80 TABLET, FILM COATED ORAL at 21:24

## 2019-08-06 RX ADMIN — OXYCODONE AND ACETAMINOPHEN 1 TABLET(S): 5; 325 TABLET ORAL at 22:00

## 2019-08-06 RX ADMIN — Medication 20 MILLIGRAM(S): at 05:36

## 2019-08-06 RX ADMIN — Medication 12.5 MILLIGRAM(S): at 17:31

## 2019-08-06 RX ADMIN — Medication 12.5 MILLIGRAM(S): at 05:36

## 2019-08-06 RX ADMIN — FAMOTIDINE 20 MILLIGRAM(S): 10 INJECTION INTRAVENOUS at 05:36

## 2019-08-06 RX ADMIN — INSULIN GLARGINE 10 UNIT(S): 100 INJECTION, SOLUTION SUBCUTANEOUS at 21:24

## 2019-08-06 RX ADMIN — Medication 325 MILLIGRAM(S): at 17:31

## 2019-08-06 RX ADMIN — Medication 81 MILLIGRAM(S): at 11:06

## 2019-08-06 RX ADMIN — HEPARIN SODIUM 5000 UNIT(S): 5000 INJECTION INTRAVENOUS; SUBCUTANEOUS at 16:11

## 2019-08-06 RX ADMIN — Medication 500 MILLIGRAM(S): at 11:06

## 2019-08-06 RX ADMIN — METFORMIN HYDROCHLORIDE 1000 MILLIGRAM(S): 850 TABLET ORAL at 07:36

## 2019-08-06 RX ADMIN — ESCITALOPRAM OXALATE 10 MILLIGRAM(S): 10 TABLET, FILM COATED ORAL at 11:06

## 2019-08-06 RX ADMIN — OXYCODONE AND ACETAMINOPHEN 1 TABLET(S): 5; 325 TABLET ORAL at 14:10

## 2019-08-06 RX ADMIN — Medication 1: at 07:36

## 2019-08-06 RX ADMIN — HEPARIN SODIUM 5000 UNIT(S): 5000 INJECTION INTRAVENOUS; SUBCUTANEOUS at 21:23

## 2019-08-06 NOTE — PROGRESS NOTE ADULT - SUBJECTIVE AND OBJECTIVE BOX
Patient is a 59y old  Female who presents with a chief complaint of gait instability, ADL impairments and functional impairments s/p CABG 19 (05 Aug 2019 15:05)      HPI:  Pt is a 59 year old Azeri and English speaking female with PMH of HTN, HLD, DM2 who presented to [facility] ED  with near syncope after arguing with her daughter. Patient also had acute onset nausea, diaphoresis, palpitations, dizziness, and mild chest pressure. NO chest pain, SOB, AGARWAL, PND, orthopnea, increased lower extremity edema, fever/chills, cough. The patient had cath at [facility] which showed 95% left main and 95% RCA lesions and she was transferred to [facility] for CABG on  with Dr. Ramey. Hospital course sig. for chest tube placement (DC'd ), RLE ghassan (DC'd ), small R thigh hematoma () and afebrile leukocytosis to 11 (). Admitted to [facility] for gait instability, ADL impairments and functional impairments s/p CABG. (02 Aug 2019 12:13)      PAST MEDICAL & SURGICAL HISTORY:  Gall stone pancreatitis: S/P laparoscopic cholecystectomy  Low back pain  Diabetes mellitus, type II  Benign hypertension  History of cholecystectomy: laparoscopic cholecystectomy  H/O:       MEDICATIONS  (STANDING):  ascorbic acid 500 milliGRAM(s) Oral daily  aspirin  chewable 81 milliGRAM(s) Oral daily  atorvastatin 40 milliGRAM(s) Oral at bedtime  dextrose 5%. 1000 milliLiter(s) (50 mL/Hr) IV Continuous <Continuous>  dextrose 50% Injectable 12.5 Gram(s) IV Push once  dextrose 50% Injectable 25 Gram(s) IV Push once  dextrose 50% Injectable 25 Gram(s) IV Push once  escitalopram 10 milliGRAM(s) Oral daily  ferrous    sulfate 325 milliGRAM(s) Oral two times a day  furosemide    Tablet 20 milliGRAM(s) Oral daily  heparin  Injectable 5000 Unit(s) SubCutaneous every 8 hours  insulin glargine Injectable (LANTUS) 10 Unit(s) SubCutaneous at bedtime  insulin lispro (HumaLOG) corrective regimen sliding scale   SubCutaneous three times a day before meals  insulin lispro (HumaLOG) corrective regimen sliding scale   SubCutaneous at bedtime  metFORMIN 1000 milliGRAM(s) Oral two times a day  metoprolol tartrate 12.5 milliGRAM(s) Oral two times a day  pantoprazole    Tablet 40 milliGRAM(s) Oral before breakfast    MEDICATIONS  (PRN):  acetaminophen   Tablet .. 650 milliGRAM(s) Oral every 6 hours PRN Temp greater or equal to 38C (100.4F), Mild Pain (1 - 3)  dextrose 40% Gel 15 Gram(s) Oral once PRN Blood Glucose LESS THAN 70 milliGRAM(s)/deciliter  docusate sodium 100 milliGRAM(s) Oral two times a day PRN Constipation  famotidine    Tablet 20 milliGRAM(s) Oral daily PRN reflux  glucagon  Injectable 1 milliGRAM(s) IntraMuscular once PRN Glucose LESS THAN 70 milligrams/deciliter  oxyCODONE    5 mG/acetaminophen 325 mG 1 Tablet(s) Oral every 6 hours PRN Moderate Pain (4 - 6)  oxyCODONE    5 mG/acetaminophen 325 mG 2 Tablet(s) Oral every 6 hours PRN Severe Pain (7 - 10)  polyethylene glycol 3350 17 Gram(s) Oral daily PRN Constipation  senna 2 Tablet(s) Oral at bedtime PRN Constipation      Allergies    metronidazole (Rash)    Intolerances    Norvasc (Swelling)        VITALS  59y  Vital Signs Last 24 Hrs  T(C): 36.8 (06 Aug 2019 07:22), Max: 36.8 (06 Aug 2019 07:22)  T(F): 98.3 (06 Aug 2019 07:22), Max: 98.3 (06 Aug 2019 07:22)  HR: 63 (06 Aug 2019 07:22) (63 - 77)  BP: 100/64 (06 Aug 2019 07:22) (94/61 - 106/64)  BP(mean): --  RR: 14 (06 Aug 2019 07:22) (14 - 14)  SpO2: 94% (06 Aug 2019 07:22) (94% - 98%)  Daily     Daily Weight in k.8 (06 Aug 2019 06:12)        RECENT LABS:                          8.7    11.56 )-----------( 342      ( 05 Aug 2019 05:55 )             27.2     08-05    138  |  100  |  12  ----------------------------<  163<H>  3.9   |  28  |  0.69    Ca    8.7      05 Aug 2019 05:55  Mg     1.6                   Culture - Urine (collected 19 @ 11:30)  Source: .Urine Clean Catch (Midstream)  Final Report (19 @ 12:45):    <10,000 CFU/mL Normal Urogenital Lydia    Culture - Urine (collected 19 @ 19:24)  Source: .Urine  Final Report (19 @ 16:58):    >=3 organisms. Probable collection contamination.        CAPILLARY BLOOD GLUCOSE      POCT Blood Glucose.: 189 mg/dL (06 Aug 2019 07:31)  POCT Blood Glucose.: 178 mg/dL (05 Aug 2019 21:34)  POCT Blood Glucose.: 162 mg/dL (05 Aug 2019 16:48)  POCT Blood Glucose.: 174 mg/dL (05 Aug 2019 11:34)

## 2019-08-06 NOTE — PROGRESS NOTE ADULT - ASSESSMENT
ASSESSMENT/PLAN  Pt is a 59 year old Hebrew and English speaking female with PMH of HTN, HLD, DM2 who presented 07/30/19 with near syncope, found to have 95% left main and 95% RCA lesions now s/p CABG 07/27/19 with subsequent gait instability, ADL impairments and functional impairments.    #S/p CABG 7/27/19  -Continue with comprehensive rehab program  -Continue with BP and CAD management as below  -Hospitalist c/s, recs appreciated: lasix decreased to 20mg 8/4, spironolactone dc'd secondary to hypotension. monitor vitals, fluid status. Patient with trace pretibial edema 8/6. /64  -Incentive spirometry  -post surgical support bra, patient agreeable for support  -Strict intake/output, daily weights	  -chest discomfort appears to have been more musculoskeletal but monitor closely, hospitalist follow up  -Outpt f/u with Dr. Pallazo, f/u re: suture removal date    #CAD:  -ASA  -Atorvastatin 40mg qhs  -Metoprolol 25mg BID  -Cardiology oupt f/u with Dr. Juárez     #HTN  -Metoprolol 25mg BID  -Furosemide 20mg PO daily. monitor weights, I+O    #Diabetes mellitus, type II  -Humalog 2u pre-meal, SSI  -Metformin 1000mg PO bid      #Leukocytosis: Pt afebrile   - 08/02: 11.4, (08/01: 11.3; 07/31: 8.2)  8/5: 11.56  - UA: >3 organisms, likely contaminant, <10k 8/5 (final)  - 8/8 CBC    #Anemia, stable  -08/05: 8.7 (08/01: 9.8; 07/31: 8.7)  -Monitor CBC    #Pain control  -Tylenol PRN, Percocet PRN    #Hyperlipidemia  -Atorvastatin 40mg qhs    #GERD  - Protonix  -add simethicone    #DVT prophylaxis  -SubQ heparin  -SCDs, TEDs    #Bowel  -Colace, Miralax    #Mood  -Lexapro  -neuropsychology evaluation mood, supportive counseling 8/6    #Nutrition  -Regular diet Consistent carb/no snack    #FEN   - Diet - CCHO        -#DVT PPx:   Heparin, SCDs, TEDs    LABS;  CBC 8/8  neuropsychology

## 2019-08-06 NOTE — PROGRESS NOTE ADULT - SUBJECTIVE AND OBJECTIVE BOX
Patient is a 59y old  Female who presents with a chief complaint of gait instability, ADL impairments and functional impairments s/p CABG 19 (06 Aug 2019 11:16)      Patient seen and examined at bedside, no events overnight, in no acute distress.     ALLERGIES:  metronidazole (Rash)  Norvasc (Swelling)    MEDICATIONS:  ascorbic acid 500 milliGRAM(s) Oral daily  docusate sodium 100 milliGRAM(s) Oral two times a day PRN  famotidine    Tablet 20 milliGRAM(s) Oral daily PRN  ferrous    sulfate 325 milliGRAM(s) Oral two times a day  furosemide    Tablet 20 milliGRAM(s) Oral daily  insulin glargine Injectable (LANTUS) 10 Unit(s) SubCutaneous at bedtime  metoprolol tartrate 12.5 milliGRAM(s) Oral two times a day  pantoprazole    Tablet 40 milliGRAM(s) Oral before breakfast  polyethylene glycol 3350 17 Gram(s) Oral daily PRN    Vital Signs Last 24 Hrs  T(C): 36.8 (06 Aug 2019 07:22), Max: 36.8 (06 Aug 2019 07:22)  T(F): 98.3 (06 Aug 2019 07:22), Max: 98.3 (06 Aug 2019 07:22)  HR: 63 (06 Aug 2019 07:22) (63 - 77)  BP: 100/64 (06 Aug 2019 07:22) (94/61 - 106/64)  BP(mean): --  RR: 14 (06 Aug 2019 07:22) (14 - 14)  SpO2: 94% (06 Aug 2019 07:22) (94% - 98%)  I&O's Summary        PAST MEDICAL & SURGICAL HISTORY:  Gall stone pancreatitis: S/P laparoscopic cholecystectomy  Low back pain  Diabetes mellitus, type II  Benign hypertension  History of cholecystectomy: laparoscopic cholecystectomy  H/O:       Home Medications:  Aspirin Enteric Coated 81 mg oral delayed release tablet: 1 tab(s) orally once a day (2019 12:18)  atorvastatin 40 mg oral tablet: 1 tab(s) orally once a day (at bedtime) (02 Aug 2019 11:50)  docusate sodium 100 mg oral capsule: 1 cap(s) orally 3 times a day (02 Aug 2019 11:50)  escitalopram 10 mg oral tablet: 1 tab(s) orally once a day (2019 12:18)  famotidine 20 mg oral tablet: 1 tab(s) orally 2 times a day (02 Aug 2019 11:50)  furosemide 40 mg oral tablet: 1 tab(s) orally once a day for 3 more days end on  (02 Aug 2019 11:50)  heparin: 5000 unit(s) subcutaneous every 8 hours (02 Aug 2019 11:50)  HumaLOG 100 units/mL subcutaneous solution: 2 unit(s) subcutaneous 3 times a day (before meals) (02 Aug 2019 11:50)  metFORMIN 1000 mg oral tablet: 1 tab(s) orally 2 times a day (2019 12:18)  metoprolol tartrate 25 mg oral tablet: 1 tab(s) orally 2 times a day (02 Aug 2019 11:50)  oxycodone-acetaminophen 5 mg-325 mg oral tablet: 1 tab(s) orally every 6 hours, As needed, Moderate Pain (4 - 6) (02 Aug 2019 11:49)  polyethylene glycol 3350 oral powder for reconstitution: 17 gram(s) orally once a day (02 Aug 2019 11:50)  spironolactone 25 mg oral tablet: 1 tab(s) orally once a day for 3 more days end on  (02 Aug 2019 11:50)      PHYSICAL EXAM:  General: NAD, A/O x3  ENT: MMM  Neck: Supple, No JVD  Lungs: Clear to percussion bilaterally  Cardio: RRR, S1/S2, No murmurs  Abdomen: Soft, Nontender, Nondistended; Bowel sounds present  Neuro: no focal deficits   Extremities: No clubbing, cyanosis, or edema    LABS:                        8.7    11.56 )-----------( 342      ( 05 Aug 2019 05:55 )             27.2         138  |  100  |  12  ----------------------------<  163  3.9   |  28  |  0.69    Ca    8.7      05 Aug 2019 05:55  Mg     1.6               07-26 Chol 128 mg/dL LDL 60 mg/dL HDL 62 mg/dL Trig 81 mg/dL    POCT Blood Glucose.: 256 mg/dL (06 Aug 2019 11:23)  POCT Blood Glucose.: 189 mg/dL (06 Aug 2019 07:31)  POCT Blood Glucose.: 178 mg/dL (05 Aug 2019 21:34)  POCT Blood Glucose.: 162 mg/dL (05 Aug 2019 16:48)    07-26 [id number] 6.9    Culture - Urine (collected 04 Aug 2019 11:30)  Source: .Urine Clean Catch (Midstream)  Final Report (05 Aug 2019 12:45):    <10,000 CFU/mL Normal Urogenital Lydia    Culture - Urine (collected 01 Aug 2019 19:24)  Source: .Urine  Final Report (02 Aug 2019 16:58):    >=3 organisms. Probable collection contamination.    RADIOLOGY & ADDITIONAL TESTS: no new tests     Care Discussed with Consultants/Other Providers: PM&R team

## 2019-08-06 NOTE — PROGRESS NOTE ADULT - CARDIOVASCULAR COMMENTS
sternal wound with prineotape. +drain site with sutures sternal wound with prineotape. +drain site with sutures. reproducible TTP. dry, no swelling or erythema.

## 2019-08-06 NOTE — PROGRESS NOTE ADULT - GENERAL COMMENTS
Patient had emotional conversation last night with family and reportedly had chest discomfort which resolved. No diaphoresis, no palpitations, no squeezing or radiating pains. Felt more right ad left side and over sternal incision. Has been using incentrive spirometer, feels tired at end, but trying to be compliant

## 2019-08-06 NOTE — PROGRESS NOTE ADULT - ASSESSMENT
59 year old German and English speaking female with PMH of HTN, HLD, DM2 who presented 07/30/19 with near syncope, found to have 95% left main and 95% RCA lesions now s/p CABG 07/27/19 with subsequent gait instability, ADL impairments and functional impairments- pt/ot/dvt ppx, pain meds, Lasix for 3 more days (end 8/4), continue with spironolactone for 3 more days (end on 8/5)    CAD:-ASA    HLD- Atorvastatin    HTN -Metoprolol, will decrease lasix to 20 mg daily, will stop aldactone since BP noted to be low this am.     Diabetes mellitus, type 2- controlled;  c/w LANTUS, ISS, c/w metformin  Hemoglobin A1C, Whole Blood: 6.9    Leukocytosis: ? unclear etiology likely reactive, ,Reviewed cxr on 8/3/19 - no infiltrate, Pt afebrile,   Reviwed results of UA - no growth; discussed with PM&R team     Anemia- likely post op blood loss anemia will monitor h/h, will start iron/vit c    Pain control -Tylenol PRN, Percocet PRN    Hyperlipidemia- Atorvastatin 40mg qhs    DVT prophylaxis-SubQ heparin    Bowel- prn Colace, Miralax    Mood-Lexapro    c/w current care, will follow

## 2019-08-07 LAB — TROPONIN I SERPL-MCNC: <.017 NG/ML — LOW (ref 0.02–0.06)

## 2019-08-07 PROCEDURE — 99232 SBSQ HOSP IP/OBS MODERATE 35: CPT

## 2019-08-07 PROCEDURE — 93010 ELECTROCARDIOGRAM REPORT: CPT

## 2019-08-07 PROCEDURE — 99233 SBSQ HOSP IP/OBS HIGH 50: CPT

## 2019-08-07 RX ADMIN — OXYCODONE AND ACETAMINOPHEN 1 TABLET(S): 5; 325 TABLET ORAL at 13:07

## 2019-08-07 RX ADMIN — OXYCODONE AND ACETAMINOPHEN 1 TABLET(S): 5; 325 TABLET ORAL at 12:16

## 2019-08-07 RX ADMIN — OXYCODONE AND ACETAMINOPHEN 1 TABLET(S): 5; 325 TABLET ORAL at 21:50

## 2019-08-07 RX ADMIN — Medication 20 MILLIGRAM(S): at 05:35

## 2019-08-07 RX ADMIN — HEPARIN SODIUM 5000 UNIT(S): 5000 INJECTION INTRAVENOUS; SUBCUTANEOUS at 21:50

## 2019-08-07 RX ADMIN — Medication 500 MILLIGRAM(S): at 13:03

## 2019-08-07 RX ADMIN — OXYCODONE AND ACETAMINOPHEN 1 TABLET(S): 5; 325 TABLET ORAL at 04:28

## 2019-08-07 RX ADMIN — Medication 12.5 MILLIGRAM(S): at 17:03

## 2019-08-07 RX ADMIN — ESCITALOPRAM OXALATE 10 MILLIGRAM(S): 10 TABLET, FILM COATED ORAL at 12:14

## 2019-08-07 RX ADMIN — METFORMIN HYDROCHLORIDE 1000 MILLIGRAM(S): 850 TABLET ORAL at 05:35

## 2019-08-07 RX ADMIN — PANTOPRAZOLE SODIUM 40 MILLIGRAM(S): 20 TABLET, DELAYED RELEASE ORAL at 05:35

## 2019-08-07 RX ADMIN — OXYCODONE AND ACETAMINOPHEN 1 TABLET(S): 5; 325 TABLET ORAL at 05:07

## 2019-08-07 RX ADMIN — Medication 12.5 MILLIGRAM(S): at 05:35

## 2019-08-07 RX ADMIN — OXYCODONE AND ACETAMINOPHEN 1 TABLET(S): 5; 325 TABLET ORAL at 22:50

## 2019-08-07 RX ADMIN — METFORMIN HYDROCHLORIDE 1000 MILLIGRAM(S): 850 TABLET ORAL at 17:03

## 2019-08-07 RX ADMIN — Medication 325 MILLIGRAM(S): at 05:35

## 2019-08-07 RX ADMIN — INSULIN GLARGINE 10 UNIT(S): 100 INJECTION, SOLUTION SUBCUTANEOUS at 21:50

## 2019-08-07 RX ADMIN — Medication 81 MILLIGRAM(S): at 12:14

## 2019-08-07 RX ADMIN — Medication 1: at 07:42

## 2019-08-07 RX ADMIN — Medication 1: at 12:13

## 2019-08-07 RX ADMIN — ATORVASTATIN CALCIUM 40 MILLIGRAM(S): 80 TABLET, FILM COATED ORAL at 21:50

## 2019-08-07 RX ADMIN — Medication 1: at 17:04

## 2019-08-07 RX ADMIN — HEPARIN SODIUM 5000 UNIT(S): 5000 INJECTION INTRAVENOUS; SUBCUTANEOUS at 13:03

## 2019-08-07 RX ADMIN — Medication 325 MILLIGRAM(S): at 17:03

## 2019-08-07 RX ADMIN — HEPARIN SODIUM 5000 UNIT(S): 5000 INJECTION INTRAVENOUS; SUBCUTANEOUS at 05:35

## 2019-08-07 NOTE — PROGRESS NOTE ADULT - SKIN COMMENTS
sternal incision site healing well, skin well approximated. No erythema, swelling or discharge. Mild tenderness to palpation. Substernal drain sites with sutures intact, healing well. Some healing ecchymoses. skin well approximated. No erythema, swelling or discharge. Mild tenderness to palpation.

## 2019-08-07 NOTE — PROGRESS NOTE ADULT - CONSTITUTIONAL COMMENTS
NAD, resting comfortably NAD, resting comfortably. Later seen in PT, sitting in wheelchair NAD, but endorses earlier chest discomfort, more left sided

## 2019-08-07 NOTE — PROVIDER CONTACT NOTE (OTHER) - RECOMMENDATIONS
Give PRN Percocet ( which patient admits helped with the pain in the past) and per MD, if the pain changes or worsens consider EKG.

## 2019-08-07 NOTE — PROGRESS NOTE ADULT - GENERAL COMMENTS
L chest heaviness. No chest pain, SOB, diaphoresis, no palpitations, no squeezing or radiating pains. L chest heaviness. No chest pain, SOB, diaphoresis, no palpitations, no squeezing or radiating pains. not persistent, but recurring

## 2019-08-07 NOTE — PROGRESS NOTE ADULT - SUBJECTIVE AND OBJECTIVE BOX
Patient is a 59y old  Female who presents with a chief complaint of gait instability, ADL impairments and functional impairments s/p CABG 19 (07 Aug 2019 11:22)      HPI:  Pt is a 59 year old Upper sorbian and English speaking female with PMH of HTN, HLD, DM2 who presented to [facility] ED  with near syncope after arguing with her daughter. Patient also had acute onset nausea, diaphoresis, palpitations, dizziness, and mild chest pressure. NO chest pain, SOB, AGARWAL, PND, orthopnea, increased lower extremity edema, fever/chills, cough. The patient had cath at [facility] which showed 95% left main and 95% RCA lesions and she was transferred to [facility] for CABG on  with Dr. Ramey. Hospital course sig. for chest tube placement (DC'd ), RLE ghassan (DC'd ), small R thigh hematoma () and afebrile leukocytosis to 11 (). Admitted to [facility] re[facility] for gait instability, ADL impairments and functional impairments s/p CABG. (02 Aug 2019 12:13)      SUBJECTIVE: Patient seen and examined. Overnight patient states she had some sternal chest pain around her incision site, which was resolved with pain medication. Patient states she felt well this morning, was able to participate in therapies and had a visit from her  and son. About an hour prior to examination, patient states that she started feeling intermittent L sided chest heaviness. She does not specify any action or motion that initiates, aggravates or alleviates the heaviness. States she only has pain with palpation of the intercostal areas and around her surgical incision sites. Denies radiation of pain or heaviness to L arm or L jaw. Denies palpitations, SOB, dizziness, HA, nausea, vomiting, diaphoresis and does not feel as though she will pass out.   Upper sorbian  Jessica #[id number]      PAST MEDICAL & SURGICAL HISTORY:  Gall stone pancreatitis: S/P laparoscopic cholecystectomy  Low back pain  Diabetes mellitus, type II  Benign hypertension  History of cholecystectomy: laparoscopic cholecystectomy  H/O:       MEDICATIONS  (STANDING):  ascorbic acid 500 milliGRAM(s) Oral daily  aspirin  chewable 81 milliGRAM(s) Oral daily  atorvastatin 40 milliGRAM(s) Oral at bedtime  dextrose 5%. 1000 milliLiter(s) (50 mL/Hr) IV Continuous <Continuous>  dextrose 50% Injectable 12.5 Gram(s) IV Push once  dextrose 50% Injectable 25 Gram(s) IV Push once  dextrose 50% Injectable 25 Gram(s) IV Push once  escitalopram 10 milliGRAM(s) Oral daily  ferrous    sulfate 325 milliGRAM(s) Oral two times a day  heparin  Injectable 5000 Unit(s) SubCutaneous every 8 hours  insulin glargine Injectable (LANTUS) 10 Unit(s) SubCutaneous at bedtime  insulin lispro (HumaLOG) corrective regimen sliding scale   SubCutaneous three times a day before meals  insulin lispro (HumaLOG) corrective regimen sliding scale   SubCutaneous at bedtime  metFORMIN 1000 milliGRAM(s) Oral two times a day  metoprolol tartrate 12.5 milliGRAM(s) Oral two times a day  pantoprazole    Tablet 40 milliGRAM(s) Oral before breakfast    MEDICATIONS  (PRN):  acetaminophen   Tablet .. 650 milliGRAM(s) Oral every 6 hours PRN Temp greater or equal to 38C (100.4F), Mild Pain (1 - 3)  dextrose 40% Gel 15 Gram(s) Oral once PRN Blood Glucose LESS THAN 70 milliGRAM(s)/deciliter  docusate sodium 100 milliGRAM(s) Oral two times a day PRN Constipation  famotidine    Tablet 20 milliGRAM(s) Oral daily PRN reflux  glucagon  Injectable 1 milliGRAM(s) IntraMuscular once PRN Glucose LESS THAN 70 milligrams/deciliter  oxyCODONE    5 mG/acetaminophen 325 mG 1 Tablet(s) Oral every 6 hours PRN Moderate Pain (4 - 6)  oxyCODONE    5 mG/acetaminophen 325 mG 2 Tablet(s) Oral every 6 hours PRN Severe Pain (7 - 10)  polyethylene glycol 3350 17 Gram(s) Oral daily PRN Constipation  senna 2 Tablet(s) Oral at bedtime PRN Constipation      Allergies    metronidazole (Rash)    Intolerances    Norvasc (Swelling)        VITALS  59y  Vital Signs Last 24 Hrs  T(C): 37 (07 Aug 2019 07:44), Max: 37 (07 Aug 2019 07:44)  T(F): 98.6 (07 Aug 2019 07:44), Max: 98.6 (07 Aug 2019 07:44)  HR: 66 (07 Aug 2019 07:44) (66 - 80)  BP: 96/58 (07 Aug 2019 07:44) (91/55 - 115/70)  BP(mean): --  RR: 15 (07 Aug 2019 07:44) (14 - 16)  SpO2: 98% (07 Aug 2019 07:44) (94% - 98%)  Daily     Daily Weight in k.5 (07 Aug 2019 07:05)        RECENT LABS:                    Culture - Urine (collected 19 @ 11:30)  Source: .Urine Clean Catch (Midstream)  Final Report (19 @ 12:45):    <10,000 CFU/mL Normal Urogenital Lydia        CAPILLARY BLOOD GLUCOSE      POCT Blood Glucose.: 170 mg/dL (07 Aug 2019 12:08)  POCT Blood Glucose.: 170 mg/dL (07 Aug 2019 07:39)  POCT Blood Glucose.: 142 mg/dL (06 Aug 2019 21:22)  POCT Blood Glucose.: 142 mg/dL (06 Aug 2019 16:15)

## 2019-08-07 NOTE — PROGRESS NOTE ADULT - ASSESSMENT
59 year old Danish and English speaking female with PMH of HTN, HLD, DM2 who presented 07/30/19 with near syncope, found to have 95% left main and 95% RCA lesions now s/p CABG 07/27/19 with subsequent gait instability, ADL impairments and functional impairments- pt/ot/dvt ppx, pain meds, Lasix for 3 more days (end 8/4), continue with spironolactone for 3 more days (end on 8/5)    CAD:-ASA    HLD- Atorvastatin    HTN -Metoprolol, will decrease lasix to 20 mg daily, will stop aldactone since BP low-- BP continues to run low may consider holding lasix and perhaps a little fluid if BPs continue to runn below 100 systolic    Diabetes mellitus, type 2- controlled;  c/w LANTUS, ISS, c/w metformin  Hemoglobin A1C, Whole Blood: 6.9    Leukocytosis: ? unclear etiology likely reactive, ,Reviewed cxr on 8/3/19 - no infiltrate, Pt afebrile,   Reviwed results of UA - no growth; continue to monitor Leukocytosis-- cbc in am     Anemia- likely post op blood loss anemia will monitor h/h, will start iron/vit c-  CBC in am     Pain control -Tylenol PRN, Percocet PRN    Hyperlipidemia- Atorvastatin 40mg qhs    DVT prophylaxis-SubQ heparin    Bowel- prn Colace, Miralax    Mood-Lexapro    c/w current care, will follow

## 2019-08-07 NOTE — PROGRESS NOTE ADULT - ASSESSMENT
ASSESSMENT/PLAN  Pt is a 59 year old Indonesian and English speaking female with PMH of HTN, HLD, DM2 who presented 07/30/19 with near syncope, found to have 95% left main and 95% RCA lesions now s/p CABG 07/27/19 with subsequent gait instability, ADL impairments and functional impairments.    #S/p CABG 7/27/19  -Continue with comprehensive rehab program  -Continue with BP and CAD management as below  -Hospitalist c/s, recs appreciated: lasix decreased to 20mg 8/4, spironolactone dc'd secondary to hypotension. monitor vitals, fluid status. Patient with trace pretibial edema 8/6. /64  -Incentive spirometry  -post surgical support bra, patient agreeable for support  -Strict intake/output, daily weights	  -chest discomfort appeared to have been more musculoskeletal but today with localized non-reproducible L chest heaviness, with new EKG changes when compared to prior 7/28. F/u troponin and hospitalist follow up  -Outpt f/u with Dr. Pallazo, f/u re: suture removal date    #CAD:  -ASA  -Atorvastatin 40mg qhs  -Metoprolol 25mg BID  -Cardiology oupt f/u with Dr. Juárez     #HTN  -Metoprolol 25mg BID  -8/7 Discontinue Furosemide 20mg PO daily, per discharge recs. monitor weights, I+O    #Diabetes mellitus, type II  -Humalog 2u pre-meal, SSI  -Metformin 1000mg PO bid      #Leukocytosis: Pt afebrile   - 08/02: 11.4, (08/01: 11.3; 07/31: 8.2)  8/5: 11.56  - UA: >3 organisms, likely contaminant, <10k 8/5 (final)  - 8/8 CBC    #Anemia, stable  -08/05: 8.7 (08/01: 9.8; 07/31: 8.7)  -Monitor CBC    #Pain control  -Tylenol PRN, Percocet PRN    #Hyperlipidemia  -Atorvastatin 40mg qhs    #GERD  - Protonix  -add simethicone    #DVT prophylaxis  -SubQ heparin  -SCDs, TEDs    #Bowel  -Colace, Miralax    #Mood  -Lexapro  -neuropsychology evaluation mood, supportive counseling 8/6    #Nutrition  -Regular diet Consistent carb/no snack    #FEN   - Diet - CCHO        -#DVT PPx:   Heparin, SCDs, TEDs    LABS;  CBC 8/8  neuropsychology  f/u hospitalist  f/u troponin ASSESSMENT/PLAN  Pt is a 59 year old [place] and English speaking female with PMH of HTN, HLD, DM2 who presented 07/30/19 with near syncope, found to have 95% left main and 95% RCA lesions now s/p CABG 07/27/19 with subsequent gait instability, ADL impairments and functional impairments.    #S/p CABG 7/27/19  -Continue with comprehensive rehab program  -Continue with BP and CAD management as below  -Incentive spirometry  -post surgical support bra, patient agreeable for support  -Strict intake/output, daily weights	  -chest discomfort appeared to have been more musculoskeletal but today with localized non-reproducible L chest heaviness, with new EKG changes (Tw changes in V2-5) when compared to prior 7/28  - troponin and hospitalist follow up. Discussed with patient  -Outpt f/u with Dr. Pallazo, f/u re: suture removal date    #CAD:  -ASA  -Atorvastatin 40mg qhs  -Metoprolol 25mg BID  -Cardiology oupt f/u with Dr. Juárez     #HTN  -Metoprolol 25mg BID  -8/7 Discontinue Furosemide 20mg PO daily, per discharge recs. monitor weights, I+O    #Diabetes mellitus, type II  -Humalog 2u pre-meal, SSI  -Metformin 1000mg PO bid      #Leukocytosis: Pt afebrile   - 08/02: 11.4, (08/01: 11.3; 07/31: 8.2)  8/5: 11.56  - UA: >3 organisms, likely contaminant, <10k 8/5 (final)  - 8/8 CBC    #Anemia, stable  -08/05: 8.7 (08/01: 9.8; 07/31: 8.7)  -Monitor CBC    #Pain control  -Tylenol PRN, Percocet PRN    #Hyperlipidemia  -Atorvastatin 40mg qhs    #GERD  - Protonix  -add simethicone    #DVT prophylaxis  -SubQ heparin  -SCDs, TEDs    #Bowel  -Colace, Miralax    #Mood  -Lexapro  -neuropsychology evaluation mood, supportive counseling 8/6    #Nutrition  -Regular diet Consistent carb/no snack    #FEN   - Diet - CCHO        -#DVT PPx:   Heparin, SCDs, TEDs    LABS;  CBC 8/8  neuropsychology  f/u hospitalist  f/u troponin

## 2019-08-07 NOTE — PROGRESS NOTE ADULT - SUBJECTIVE AND OBJECTIVE BOX
Patient is a 59y old  Female who presents with a chief complaint of gait instability, ADL impairments and functional impairments s/p CABG 07/27/19.  Pt with complaint of some pain in the middle of the night but it was resolved with pain medication.  Pt notes tired this am.      Patient seen and examined at bedside.    ALLERGIES:  metronidazole (Rash)  Norvasc (Swelling)    MEDICATIONS  (STANDING):  ascorbic acid 500 milliGRAM(s) Oral daily  aspirin  chewable 81 milliGRAM(s) Oral daily  atorvastatin 40 milliGRAM(s) Oral at bedtime  dextrose 5%. 1000 milliLiter(s) (50 mL/Hr) IV Continuous <Continuous>  dextrose 50% Injectable 12.5 Gram(s) IV Push once  dextrose 50% Injectable 25 Gram(s) IV Push once  dextrose 50% Injectable 25 Gram(s) IV Push once  escitalopram 10 milliGRAM(s) Oral daily  ferrous    sulfate 325 milliGRAM(s) Oral two times a day  furosemide    Tablet 20 milliGRAM(s) Oral daily  heparin  Injectable 5000 Unit(s) SubCutaneous every 8 hours  insulin glargine Injectable (LANTUS) 10 Unit(s) SubCutaneous at bedtime  insulin lispro (HumaLOG) corrective regimen sliding scale   SubCutaneous three times a day before meals  insulin lispro (HumaLOG) corrective regimen sliding scale   SubCutaneous at bedtime  metFORMIN 1000 milliGRAM(s) Oral two times a day  metoprolol tartrate 12.5 milliGRAM(s) Oral two times a day  pantoprazole    Tablet 40 milliGRAM(s) Oral before breakfast    MEDICATIONS  (PRN):  acetaminophen   Tablet .. 650 milliGRAM(s) Oral every 6 hours PRN Temp greater or equal to 38C (100.4F), Mild Pain (1 - 3)  dextrose 40% Gel 15 Gram(s) Oral once PRN Blood Glucose LESS THAN 70 milliGRAM(s)/deciliter  docusate sodium 100 milliGRAM(s) Oral two times a day PRN Constipation  famotidine    Tablet 20 milliGRAM(s) Oral daily PRN reflux  glucagon  Injectable 1 milliGRAM(s) IntraMuscular once PRN Glucose LESS THAN 70 milligrams/deciliter  oxyCODONE    5 mG/acetaminophen 325 mG 1 Tablet(s) Oral every 6 hours PRN Moderate Pain (4 - 6)  oxyCODONE    5 mG/acetaminophen 325 mG 2 Tablet(s) Oral every 6 hours PRN Severe Pain (7 - 10)  polyethylene glycol 3350 17 Gram(s) Oral daily PRN Constipation  senna 2 Tablet(s) Oral at bedtime PRN Constipation    Vital Signs Last 24 Hrs  T(F): 98.6 (07 Aug 2019 07:44), Max: 98.6 (07 Aug 2019 07:44)  HR: 66 (07 Aug 2019 07:44) (66 - 80)  BP: 96/58 (07 Aug 2019 07:44) (91/55 - 115/70)  RR: 15 (07 Aug 2019 07:44) (14 - 16)  SpO2: 98% (07 Aug 2019 07:44) (94% - 98%)  I&O's Summary    PHYSICAL EXAM:  General: NAD, A/O x 3  ENT: MMM  Neck: Supple, No JVD  Lungs: Clear to auscultation bilaterally, Non labored breathing   Cardio: RRR, S1/S2, No murmurs  Abdomen: Soft, Nontender, Nondistended; Bowel sounds present  Extremities: No calf tenderness, No pitting edema    LABS:                        8.7    11.56 )-----------( 342      ( 05 Aug 2019 05:55 )             27.2     08-05    138  |  100  |  12  ----------------------------<  163  3.9   |  28  |  0.69    Ca    8.7      05 Aug 2019 05:55  Mg     1.6     08-05      eGFR if Non African American: 94 mL/min/1.73M2 (08-05-19 @ 05:55)  eGFR if African American: 110 mL/min/1.73M2 (08-05-19 @ 05:55)            07-26 Chol 128 mg/dL LDL 60 mg/dL HDL 62 mg/dL Trig 81 mg/dL              POCT Blood Glucose.: 170 mg/dL (07 Aug 2019 07:39)  POCT Blood Glucose.: 142 mg/dL (06 Aug 2019 21:22)  POCT Blood Glucose.: 142 mg/dL (06 Aug 2019 16:15)    07-26 [id number] 6.9        Culture - Urine (collected 04 Aug 2019 11:30)  Source: .Urine Clean Catch (Midstream)  Final Report (05 Aug 2019 12:45):    <10,000 CFU/mL Normal Urogenital Lydia      RADIOLOGY & ADDITIONAL TESTS:    Care Discussed with Consultants/Other Providers:

## 2019-08-08 LAB
ANION GAP SERPL CALC-SCNC: 8 MMOL/L — SIGNIFICANT CHANGE UP (ref 5–17)
BASOPHILS # BLD AUTO: 0.05 K/UL — SIGNIFICANT CHANGE UP (ref 0–0.2)
BASOPHILS NFR BLD AUTO: 0.5 % — SIGNIFICANT CHANGE UP (ref 0–2)
BUN SERPL-MCNC: 12 MG/DL — SIGNIFICANT CHANGE UP (ref 7–23)
CALCIUM SERPL-MCNC: 8.7 MG/DL — SIGNIFICANT CHANGE UP (ref 8.4–10.5)
CHLORIDE SERPL-SCNC: 101 MMOL/L — SIGNIFICANT CHANGE UP (ref 96–108)
CO2 SERPL-SCNC: 27 MMOL/L — SIGNIFICANT CHANGE UP (ref 22–31)
CREAT SERPL-MCNC: 0.58 MG/DL — SIGNIFICANT CHANGE UP (ref 0.5–1.3)
EOSINOPHIL # BLD AUTO: 0.27 K/UL — SIGNIFICANT CHANGE UP (ref 0–0.5)
EOSINOPHIL NFR BLD AUTO: 2.5 % — SIGNIFICANT CHANGE UP (ref 0–6)
GLUCOSE SERPL-MCNC: 180 MG/DL — HIGH (ref 70–99)
HCT VFR BLD CALC: 27.4 % — LOW (ref 34.5–45)
HGB BLD-MCNC: 8.8 G/DL — LOW (ref 11.5–15.5)
IMM GRANULOCYTES NFR BLD AUTO: 1.5 % — SIGNIFICANT CHANGE UP (ref 0–1.5)
LYMPHOCYTES # BLD AUTO: 1.47 K/UL — SIGNIFICANT CHANGE UP (ref 1–3.3)
LYMPHOCYTES # BLD AUTO: 13.4 % — SIGNIFICANT CHANGE UP (ref 13–44)
MAGNESIUM SERPL-MCNC: 1.8 MG/DL — SIGNIFICANT CHANGE UP (ref 1.6–2.6)
MCHC RBC-ENTMCNC: 30.9 PG — SIGNIFICANT CHANGE UP (ref 27–34)
MCHC RBC-ENTMCNC: 32.1 GM/DL — SIGNIFICANT CHANGE UP (ref 32–36)
MCV RBC AUTO: 96.1 FL — SIGNIFICANT CHANGE UP (ref 80–100)
MONOCYTES # BLD AUTO: 0.79 K/UL — SIGNIFICANT CHANGE UP (ref 0–0.9)
MONOCYTES NFR BLD AUTO: 7.2 % — SIGNIFICANT CHANGE UP (ref 2–14)
NEUTROPHILS # BLD AUTO: 8.23 K/UL — HIGH (ref 1.8–7.4)
NEUTROPHILS NFR BLD AUTO: 74.9 % — SIGNIFICANT CHANGE UP (ref 43–77)
NRBC # BLD: 0 /100 WBCS — SIGNIFICANT CHANGE UP (ref 0–0)
PLATELET # BLD AUTO: 385 K/UL — SIGNIFICANT CHANGE UP (ref 150–400)
POTASSIUM SERPL-MCNC: 4 MMOL/L — SIGNIFICANT CHANGE UP (ref 3.5–5.3)
POTASSIUM SERPL-SCNC: 4 MMOL/L — SIGNIFICANT CHANGE UP (ref 3.5–5.3)
RBC # BLD: 2.85 M/UL — LOW (ref 3.8–5.2)
RBC # FLD: 16.5 % — HIGH (ref 10.3–14.5)
SODIUM SERPL-SCNC: 136 MMOL/L — SIGNIFICANT CHANGE UP (ref 135–145)
WBC # BLD: 10.98 K/UL — HIGH (ref 3.8–10.5)
WBC # FLD AUTO: 10.98 K/UL — HIGH (ref 3.8–10.5)

## 2019-08-08 PROCEDURE — 99232 SBSQ HOSP IP/OBS MODERATE 35: CPT

## 2019-08-08 PROCEDURE — 99233 SBSQ HOSP IP/OBS HIGH 50: CPT

## 2019-08-08 RX ORDER — OXYCODONE AND ACETAMINOPHEN 5; 325 MG/1; MG/1
2 TABLET ORAL EVERY 6 HOURS
Refills: 0 | Status: DISCONTINUED | OUTPATIENT
Start: 2019-08-08 | End: 2019-08-14

## 2019-08-08 RX ORDER — ASCORBIC ACID 60 MG
500 TABLET,CHEWABLE ORAL
Refills: 0 | Status: DISCONTINUED | OUTPATIENT
Start: 2019-08-08 | End: 2019-08-17

## 2019-08-08 RX ORDER — OXYCODONE AND ACETAMINOPHEN 5; 325 MG/1; MG/1
1 TABLET ORAL EVERY 6 HOURS
Refills: 0 | Status: DISCONTINUED | OUTPATIENT
Start: 2019-08-08 | End: 2019-08-12

## 2019-08-08 RX ADMIN — Medication 1: at 08:06

## 2019-08-08 RX ADMIN — HEPARIN SODIUM 5000 UNIT(S): 5000 INJECTION INTRAVENOUS; SUBCUTANEOUS at 05:27

## 2019-08-08 RX ADMIN — INSULIN GLARGINE 10 UNIT(S): 100 INJECTION, SOLUTION SUBCUTANEOUS at 21:42

## 2019-08-08 RX ADMIN — OXYCODONE AND ACETAMINOPHEN 1 TABLET(S): 5; 325 TABLET ORAL at 09:00

## 2019-08-08 RX ADMIN — Medication 1: at 12:01

## 2019-08-08 RX ADMIN — ESCITALOPRAM OXALATE 10 MILLIGRAM(S): 10 TABLET, FILM COATED ORAL at 12:00

## 2019-08-08 RX ADMIN — OXYCODONE AND ACETAMINOPHEN 1 TABLET(S): 5; 325 TABLET ORAL at 18:18

## 2019-08-08 RX ADMIN — Medication 12.5 MILLIGRAM(S): at 05:27

## 2019-08-08 RX ADMIN — Medication 500 MILLIGRAM(S): at 12:01

## 2019-08-08 RX ADMIN — Medication 325 MILLIGRAM(S): at 17:12

## 2019-08-08 RX ADMIN — OXYCODONE AND ACETAMINOPHEN 1 TABLET(S): 5; 325 TABLET ORAL at 08:09

## 2019-08-08 RX ADMIN — Medication 81 MILLIGRAM(S): at 12:00

## 2019-08-08 RX ADMIN — OXYCODONE AND ACETAMINOPHEN 1 TABLET(S): 5; 325 TABLET ORAL at 18:53

## 2019-08-08 RX ADMIN — HEPARIN SODIUM 5000 UNIT(S): 5000 INJECTION INTRAVENOUS; SUBCUTANEOUS at 14:15

## 2019-08-08 RX ADMIN — Medication 1: at 17:11

## 2019-08-08 RX ADMIN — Medication 500 MILLIGRAM(S): at 17:12

## 2019-08-08 RX ADMIN — Medication 12.5 MILLIGRAM(S): at 17:12

## 2019-08-08 RX ADMIN — HEPARIN SODIUM 5000 UNIT(S): 5000 INJECTION INTRAVENOUS; SUBCUTANEOUS at 21:42

## 2019-08-08 RX ADMIN — PANTOPRAZOLE SODIUM 40 MILLIGRAM(S): 20 TABLET, DELAYED RELEASE ORAL at 05:27

## 2019-08-08 RX ADMIN — METFORMIN HYDROCHLORIDE 1000 MILLIGRAM(S): 850 TABLET ORAL at 05:27

## 2019-08-08 RX ADMIN — Medication 325 MILLIGRAM(S): at 05:27

## 2019-08-08 RX ADMIN — ATORVASTATIN CALCIUM 40 MILLIGRAM(S): 80 TABLET, FILM COATED ORAL at 21:40

## 2019-08-08 RX ADMIN — METFORMIN HYDROCHLORIDE 1000 MILLIGRAM(S): 850 TABLET ORAL at 17:12

## 2019-08-08 NOTE — CHART NOTE - NSCHARTNOTEFT_GEN_A_CORE
Nutrition Follow Up Note  Hospital Course   (Per Electronic Medical Record):     Source:   Medical Record [X]      Diet:   Consistent Carbohydrate DASH-TLC Diet w/ Thin Liquids  Tolerates Diet Well  Consumes 50% of Meals (as Per Documentation)   on Glucerna 8oz PO Daily (Provides 220kcal-10grams of Protein)     Enteral/Parenteral Nutrition: N/A    Current Weight: 131.1lb on 8/8  Obtain Weights Daily  Weights Currently Stable @This Time     Pertinent Medications: MEDICATIONS  (STANDING):  ascorbic acid 500 milliGRAM(s) Oral daily  aspirin  chewable 81 milliGRAM(s) Oral daily  atorvastatin 40 milliGRAM(s) Oral at bedtime  dextrose 5%. 1000 milliLiter(s) (50 mL/Hr) IV Continuous <Continuous>  dextrose 50% Injectable 12.5 Gram(s) IV Push once  dextrose 50% Injectable 25 Gram(s) IV Push once  dextrose 50% Injectable 25 Gram(s) IV Push once  escitalopram 10 milliGRAM(s) Oral daily  ferrous    sulfate 325 milliGRAM(s) Oral two times a day  heparin  Injectable 5000 Unit(s) SubCutaneous every 8 hours  insulin glargine Injectable (LANTUS) 10 Unit(s) SubCutaneous at bedtime  insulin lispro (HumaLOG) corrective regimen sliding scale   SubCutaneous three times a day before meals  insulin lispro (HumaLOG) corrective regimen sliding scale   SubCutaneous at bedtime  metFORMIN 1000 milliGRAM(s) Oral two times a day  metoprolol tartrate 12.5 milliGRAM(s) Oral two times a day  pantoprazole    Tablet 40 milliGRAM(s) Oral before breakfast    MEDICATIONS  (PRN):  acetaminophen   Tablet .. 650 milliGRAM(s) Oral every 6 hours PRN Temp greater or equal to 38C (100.4F), Mild Pain (1 - 3)  dextrose 40% Gel 15 Gram(s) Oral once PRN Blood Glucose LESS THAN 70 milliGRAM(s)/deciliter  docusate sodium 100 milliGRAM(s) Oral two times a day PRN Constipation  famotidine    Tablet 20 milliGRAM(s) Oral daily PRN reflux  glucagon  Injectable 1 milliGRAM(s) IntraMuscular once PRN Glucose LESS THAN 70 milligrams/deciliter  oxyCODONE    5 mG/acetaminophen 325 mG 1 Tablet(s) Oral every 6 hours PRN Moderate Pain (4 - 6)  oxyCODONE    5 mG/acetaminophen 325 mG 2 Tablet(s) Oral every 6 hours PRN Severe Pain (7 - 10)  polyethylene glycol 3350 17 Gram(s) Oral daily PRN Constipation  senna 2 Tablet(s) Oral at bedtime PRN Constipation    Pertinent Labs:  08-08 Na136 mmol/L Glu 180 mg/dL<H> K+ 4.0 mmol/L Cr  0.58 mg/dL BUN 12 mg/dL 08-03 Alb 3.0 g/dL<L> 07-26 [id number] 6.9 %<H> 07-26 Chol 128 mg/dL LDL 60 mg/dL HDL 62 mg/dL Trig 81 mg/dL    POCT (over Last 2 Days) - Ranging from 142-185    Skin: No Pressure Ulcers   Surgical Incision(as Per Nursing Flow Sheet)     Edema: +1 Edema Noted to Right Lower Extremity (as Per Documentation)     Last Bowel Movement: on 8/8    Estimated Needs:   [X] No Change Since Previous Assessment    Previous Nutrition Diagnosis:   Inadequate Protein Intake     Nutrition Diagnosis is [X] Resolved - Fair-Good PO Intake/Appetite     New Nutrition Diagnosis: [X] Not Applicable    Interventions:   1. Recommend Continue Nutrition Plan of Care     Monitoring & Evaluation:   [X] Weights   [X] PO Intake   [X] Follow Up (Per Protocol)  [X] Tolerance to Diet Prescription   [X] Other: Labs & POCT    Registered Dietitian/Nutritionist Remains Available.  Elías Sanderson RDN    Pager # [phone]  Phone# [phone]

## 2019-08-08 NOTE — PROGRESS NOTE ADULT - ASSESSMENT
59 year old Vietnamese and English speaking female with PMH of HTN, HLD, DM2 who presented 07/30/19 with near syncope, found to have 95% left main and 95% RCA lesions now s/p CABG 07/27/19 with subsequent gait instability, ADL impairments and functional impairments- pt/ot/dvt ppx, pain meds, Lasix for 3 more days (end 8/4), continue with spironolactone for 3 more days (end on 8/5)    CAD:-ASA    HLD- Atorvastatin    HTN -Metoprolol, will decrease lasix to 20 mg daily, will stop aldactone since BP low-- BP continues to run low may consider holding lasix and perhaps a little fluid if BPs continue to runn below 100 systolic    Diabetes mellitus, type 2- controlled;  c/w LANTUS, ISS, c/w metformin  Hemoglobin A1C, Whole Blood: 6.9    Leukocytosis: ? unclear etiology likely reactive, ,Reviewed cxr on 8/3/19 - no infiltrate, Pt afebrile,   Reviwed results of UA - no growth; continue to monitor Leukocytosis-- cbc in am     Anemia- likely post op blood loss anemia will monitor h/h, will start iron/vit c-  CBC in am     Pain control -Tylenol PRN, Percocet PRN    Hyperlipidemia- Atorvastatin 40mg qhs    DVT prophylaxis-SubQ heparin    Bowel- prn Colace, Miralax    Mood-Lexapro    c/w current care, will follow

## 2019-08-08 NOTE — PROGRESS NOTE ADULT - ASSESSMENT
ASSESSMENT/PLAN  Pt is a 59 year old German and English speaking female with PMH of HTN, HLD, DM2 who presented 07/30/19 with near syncope, found to have 95% left main and 95% RCA lesions now s/p CABG 07/27/19 with subsequent gait instability, ADL impairments and functional impairments.    #S/p CABG 7/27/19  -Continue with comprehensive rehab program OT, PT  -post surgical support bra ordered 8/6  -Strict intake/output, daily weights	  -chest discomfort improving, appears to be more post surgical pain, troponin negative. Continue to monitor  -Outpt f/u with Dr. Pallazo, f/u re: suture removal date    #CAD:  -ASA, Atorvastatin 40mg qhs  -Metoprolol 25mg BID  -Cardiology oupt f/u with Dr. Juárez     #HTN  -Metoprolol 25mg BID  -8/7 Discontinue Furosemide 20mg PO daily. BP 92/59 this AM, continue to monitor closely    #Diabetes mellitus, type II  -Humalog 2u pre-meal, SSI  -Metformin 1000mg PO bid      #Leukocytosis: Pt afebrile   - resolved 10.98    #Anemia, stable  -08/05: 8.7 (08/01: 9.8; 07/31: 8.7)  -Monitor CBC-->8.8 (8/8)    #Pain control  -Tylenol PRN, Percocet PRN    #Hyperlipidemia  -Atorvastatin 40mg qhs    #GERD  - Protonix  -add simethicone    #Mood  -Lexapro  -neuropsychology evaluation mood, supportive counseling 8/6    #Nutrition  -Regular diet Consistent carb/no snack    -#DVT PPx:   Heparin, SCDs, TEDs    #Case discussed in IDT rounds 8/8:  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 Goals for mod indepnedent bADLs and transfers, toileting, self care skills. Pain management., advance from walker to cane if possible. [facility] dc home 8/17/19 with VNS for wound care, home Pt and OT    LABS;  CBC, BMP 8/12  neuropsychology

## 2019-08-08 NOTE — PROGRESS NOTE ADULT - CARDIOVASCULAR COMMENTS
incision with prineotape, clean, dry, no redness or swelling +suture drain site distal end incision reports chest discomfort resolved after afternoon although still has "heaviness" which is reproducible and located over incision site, c/o itching at incision. troponin negative yesterday, reviewed with pateint

## 2019-08-08 NOTE — PROGRESS NOTE ADULT - SUBJECTIVE AND OBJECTIVE BOX
Patient is a 59y old  Female who presents with a chief complaint of gait instability, ADL impairments and functional impairments s/p CABG 19 (08 Aug 2019 12:46)      Patient seen and examined at bedside, no events overnight, in no acute distress.     ALLERGIES:  metronidazole (Rash)  Norvasc (Swelling)    MEDICATIONS:  ascorbic acid 500 milliGRAM(s) Oral daily  docusate sodium 100 milliGRAM(s) Oral two times a day PRN  famotidine    Tablet 20 milliGRAM(s) Oral daily PRN  ferrous    sulfate 325 milliGRAM(s) Oral two times a day  insulin glargine Injectable (LANTUS) 10 Unit(s) SubCutaneous at bedtime  metoprolol tartrate 12.5 milliGRAM(s) Oral two times a day  oxyCODONE    5 mG/acetaminophen 325 mG 1 Tablet(s) Oral every 6 hours PRN  oxyCODONE    5 mG/acetaminophen 325 mG 2 Tablet(s) Oral every 6 hours PRN  pantoprazole    Tablet 40 milliGRAM(s) Oral before breakfast  polyethylene glycol 3350 17 Gram(s) Oral daily PRN    Vital Signs Last 24 Hrs  T(C): 36.8 (08 Aug 2019 07:35), Max: 37.1 (07 Aug 2019 20:48)  T(F): 98.3 (08 Aug 2019 07:35), Max: 98.8 (07 Aug 2019 20:48)  HR: 70 (08 Aug 2019 07:35) (67 - 71)  BP: 92/59 (08 Aug 2019 07:35) (92/59 - 123/75)  BP(mean): --  RR: 14 (08 Aug 2019 07:35) (14 - 14)  SpO2: 97% (08 Aug 2019 07:35) (94% - 97%)  I&O's Summary        PAST MEDICAL & SURGICAL HISTORY:  Gall stone pancreatitis: S/P laparoscopic cholecystectomy  Low back pain  Diabetes mellitus, type II  Benign hypertension  History of cholecystectomy: laparoscopic cholecystectomy  H/O:       Home Medications:  Aspirin Enteric Coated 81 mg oral delayed release tablet: 1 tab(s) orally once a day (2019 12:18)  atorvastatin 40 mg oral tablet: 1 tab(s) orally once a day (at bedtime) (02 Aug 2019 11:50)  docusate sodium 100 mg oral capsule: 1 cap(s) orally 3 times a day (02 Aug 2019 11:50)  escitalopram 10 mg oral tablet: 1 tab(s) orally once a day (2019 12:18)  famotidine 20 mg oral tablet: 1 tab(s) orally 2 times a day (02 Aug 2019 11:50)  furosemide 40 mg oral tablet: 1 tab(s) orally once a day for 3 more days end on  (02 Aug 2019 11:50)  heparin: 5000 unit(s) subcutaneous every 8 hours (02 Aug 2019 11:50)  HumaLOG 100 units/mL subcutaneous solution: 2 unit(s) subcutaneous 3 times a day (before meals) (02 Aug 2019 11:50)  metFORMIN 1000 mg oral tablet: 1 tab(s) orally 2 times a day (2019 12:18)  metoprolol tartrate 25 mg oral tablet: 1 tab(s) orally 2 times a day (02 Aug 2019 11:50)  oxycodone-acetaminophen 5 mg-325 mg oral tablet: 1 tab(s) orally every 6 hours, As needed, Moderate Pain (4 - 6) (02 Aug 2019 11:49)  polyethylene glycol 3350 oral powder for reconstitution: 17 gram(s) orally once a day (02 Aug 2019 11:50)  spironolactone 25 mg oral tablet: 1 tab(s) orally once a day for 3 more days end on  (02 Aug 2019 11:50)      PHYSICAL EXAM:  General: NAD, A/O x3  ENT: MMM  Neck: Supple, No JVD  Lungs: Clear to percussion bilaterally  Cardio: RRR, S1/S2, No murmurs  Abdomen: Soft, Nontender, Nondistended; Bowel sounds present  Neuro: no focal deficits   Extremities: No clubbing, cyanosis, or edema    LABS:                        8.8    10.98 )-----------( 385      ( 08 Aug 2019 07:25 )             27.4     -    136  |  101  |  12  ----------------------------<  180  4.0   |  27  |  0.58    Ca    8.7      08 Aug 2019 07:25  Mg     1.8     08-      07-26 Chol 128 mg/dL LDL 60 mg/dL HDL 62 mg/dL Trig 81 mg/dL    POCT Blood Glucose.: 152 mg/dL (08 Aug 2019 11:58)  POCT Blood Glucose.: 185 mg/dL (08 Aug 2019 07:45)  POCT Blood Glucose.: 174 mg/dL (07 Aug 2019 21:47)  POCT Blood Glucose.: 175 mg/dL (07 Aug 2019 16:52)    07-26 [id number] 6.9    Culture - Urine (collected 04 Aug 2019 11:30)  Source: .Urine Clean Catch (Midstream)  Final Report (05 Aug 2019 12:45):    <10,000 CFU/mL Normal Urogenital Lydia        RADIOLOGY & ADDITIONAL TESTS: no new tests     Care Discussed with Consultants/Other Providers: PM&R team

## 2019-08-09 PROCEDURE — 96116 NUBHVL XM PHYS/QHP 1ST HR: CPT

## 2019-08-09 PROCEDURE — 71045 X-RAY EXAM CHEST 1 VIEW: CPT | Mod: 26

## 2019-08-09 PROCEDURE — 99231 SBSQ HOSP IP/OBS SF/LOW 25: CPT

## 2019-08-09 PROCEDURE — 93010 ELECTROCARDIOGRAM REPORT: CPT

## 2019-08-09 RX ORDER — METOPROLOL TARTRATE 50 MG
12.5 TABLET ORAL
Refills: 0 | Status: DISCONTINUED | OUTPATIENT
Start: 2019-08-09 | End: 2019-08-14

## 2019-08-09 RX ADMIN — Medication 650 MILLIGRAM(S): at 12:50

## 2019-08-09 RX ADMIN — OXYCODONE AND ACETAMINOPHEN 1 TABLET(S): 5; 325 TABLET ORAL at 05:38

## 2019-08-09 RX ADMIN — HEPARIN SODIUM 5000 UNIT(S): 5000 INJECTION INTRAVENOUS; SUBCUTANEOUS at 14:24

## 2019-08-09 RX ADMIN — METFORMIN HYDROCHLORIDE 1000 MILLIGRAM(S): 850 TABLET ORAL at 17:23

## 2019-08-09 RX ADMIN — OXYCODONE AND ACETAMINOPHEN 1 TABLET(S): 5; 325 TABLET ORAL at 23:30

## 2019-08-09 RX ADMIN — Medication 1: at 07:58

## 2019-08-09 RX ADMIN — HEPARIN SODIUM 5000 UNIT(S): 5000 INJECTION INTRAVENOUS; SUBCUTANEOUS at 22:33

## 2019-08-09 RX ADMIN — OXYCODONE AND ACETAMINOPHEN 1 TABLET(S): 5; 325 TABLET ORAL at 06:30

## 2019-08-09 RX ADMIN — Medication 1: at 17:23

## 2019-08-09 RX ADMIN — PANTOPRAZOLE SODIUM 40 MILLIGRAM(S): 20 TABLET, DELAYED RELEASE ORAL at 05:25

## 2019-08-09 RX ADMIN — INSULIN GLARGINE 10 UNIT(S): 100 INJECTION, SOLUTION SUBCUTANEOUS at 22:32

## 2019-08-09 RX ADMIN — Medication 650 MILLIGRAM(S): at 12:04

## 2019-08-09 RX ADMIN — Medication 325 MILLIGRAM(S): at 05:25

## 2019-08-09 RX ADMIN — ATORVASTATIN CALCIUM 40 MILLIGRAM(S): 80 TABLET, FILM COATED ORAL at 22:33

## 2019-08-09 RX ADMIN — Medication 500 MILLIGRAM(S): at 17:23

## 2019-08-09 RX ADMIN — Medication 500 MILLIGRAM(S): at 05:25

## 2019-08-09 RX ADMIN — Medication 100 MILLIGRAM(S): at 17:23

## 2019-08-09 RX ADMIN — Medication 1 TABLET(S): at 11:59

## 2019-08-09 RX ADMIN — METFORMIN HYDROCHLORIDE 1000 MILLIGRAM(S): 850 TABLET ORAL at 05:26

## 2019-08-09 RX ADMIN — Medication 325 MILLIGRAM(S): at 17:23

## 2019-08-09 RX ADMIN — Medication 81 MILLIGRAM(S): at 11:59

## 2019-08-09 RX ADMIN — ESCITALOPRAM OXALATE 10 MILLIGRAM(S): 10 TABLET, FILM COATED ORAL at 11:59

## 2019-08-09 RX ADMIN — HEPARIN SODIUM 5000 UNIT(S): 5000 INJECTION INTRAVENOUS; SUBCUTANEOUS at 05:26

## 2019-08-09 RX ADMIN — OXYCODONE AND ACETAMINOPHEN 1 TABLET(S): 5; 325 TABLET ORAL at 22:34

## 2019-08-09 RX ADMIN — Medication 12.5 MILLIGRAM(S): at 05:25

## 2019-08-09 RX ADMIN — Medication 12.5 MILLIGRAM(S): at 17:25

## 2019-08-09 NOTE — PROGRESS NOTE ADULT - GENERAL COMMENTS
Patient seen with assistance language line  in [facility] #[id number] Patient seen with assistance language line  in [facility] #[id number]. Later also seen by team with family

## 2019-08-09 NOTE — PROGRESS NOTE ADULT - ASSESSMENT
ASSESSMENT/PLAN  Pt is a 59 year old Japanese and English speaking female with PMH of HTN, HLD, DM2 who presented 07/30/19 with near syncope, found to have 95% left main and 95% RCA lesions now s/p CABG 07/27/19 with subsequent gait instability, ADL impairments and functional impairments.    #S/p CABG 7/27/19  - Continue with comprehensive rehab program OT, PT  - post surgical support bra ordered 8/6  - Strict intake/output, daily weights	  - chest discomfort improving, appears to be more post surgical pain, troponin negative. Continue to monitor  - Betadyne and gauze dressing to surgical drain site. Will f/u with CTS NP regarding timing of removing suture.   -Outpt f/u with Dr. Pallazo, f/u re: suture removal date  - provide surgical bra for chest support.     #CAD:  -ASA, Atorvastatin 40mg qhs  -Metoprolol 12.5mg daily  -Cardiology oupt f/u with Dr. Juárez     #HTN  -Per NP, Milly Garnett, recommends decrease Metoprolol 12.5mg qhs as patient noted to have episodes of hypotension and bradycardia throughout the day.   -8/7 Discontinued Furosemide 20mg PO daily. /66 - 117/65 this AM, continue to monitor closely  - daily weights.     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 Goals for mod indepnedent bADLs and transfers, toileting, self care skills. Pain management., advance from walker to cane if possible. [facility] dc home 8/17/19 with VNS for wound care, home Pt and OT    LABS;  CBC, BMP 8/12  neuropsychology ASSESSMENT/PLAN  Pt is a 59 year old Nepali and English speaking female with PMH of HTN, HLD, DM2 who presented 07/30/19 with near syncope, found to have 95% left main and 95% RCA lesions now s/p CABG 07/27/19 with subsequent gait instability, ADL impairments and functional impairments.    #S/p CABG 7/27/19  - Continue with comprehensive rehab program OT, PT  - post-surgical support bra ordered 8/6, patient and family aware. - chest discomfort improving, appears to be more post surgical pain, troponin negative. Continue to monitor  - Strict intake/output, daily weights	  - Betadyne and gauze dressing to surgical drain site. Will f/u with CTS NP regarding timing of removing suture.   -Outpt f/u with Dr. Pallazo, f/u re: suture removal date  - provide surgical bra for chest support.     #CAD:  -ASA, Atorvastatin 40mg qhs  -reduce Metoprolol 12.5mg qhs. Monitor BP  -Cardiology oupt f/u with Dr. Juárez     #HTN  -decrease Metoprolol 12.5mg qhs as patient noted to have episodes of hypotension and bradycardia throughout the day.   -8/7 Discontinued Furosemide 20mg PO daily. /66 - 117/65 this AM, continue to monitor closely  - daily weights.     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 Goals for mod indepnedent bADLs and transfers, toileting, self care skills. Pain management., advance from walker to cane if possible. [facility] dc home 8/17/19 with VNS for wound care, home Pt and OT    LABS;  CBC, BMP 8/12  neuropsychology  monitor BP

## 2019-08-09 NOTE — PROGRESS NOTE ADULT - CARDIOVASCULAR COMMENTS
c/o itching and pinching sensation at incision site where dried glue is. Some bleeding at drain suture site. Denies chest heaviness or pressure. +CABG sternal incision site healing well, skin well approximated, surgical glue dried and flaking off. +substernal drain site with suture in place, with minimal serosanguinous drainage. No erythema, swelling or purulent discharge.

## 2019-08-09 NOTE — PROVIDER CONTACT NOTE (OTHER) - SITUATION
on previous shift report, pt had small amount bleeding at bottom of sternal incision site and lower leg incision. upon assessment, long suture noted c dry blood on lower part of sternal incision

## 2019-08-09 NOTE — PROVIDER CONTACT NOTE (OTHER) - ASSESSMENT
dry blood noted at lower incision site, with small amount blood oozing from lower incison. pt reports discomfort when cleaning area c NS. dsd applied c small amount paper tape.

## 2019-08-10 PROCEDURE — 99232 SBSQ HOSP IP/OBS MODERATE 35: CPT

## 2019-08-10 PROCEDURE — 99233 SBSQ HOSP IP/OBS HIGH 50: CPT

## 2019-08-10 RX ADMIN — HEPARIN SODIUM 5000 UNIT(S): 5000 INJECTION INTRAVENOUS; SUBCUTANEOUS at 15:24

## 2019-08-10 RX ADMIN — OXYCODONE AND ACETAMINOPHEN 2 TABLET(S): 5; 325 TABLET ORAL at 21:47

## 2019-08-10 RX ADMIN — OXYCODONE AND ACETAMINOPHEN 1 TABLET(S): 5; 325 TABLET ORAL at 06:56

## 2019-08-10 RX ADMIN — OXYCODONE AND ACETAMINOPHEN 1 TABLET(S): 5; 325 TABLET ORAL at 05:34

## 2019-08-10 RX ADMIN — METFORMIN HYDROCHLORIDE 1000 MILLIGRAM(S): 850 TABLET ORAL at 05:34

## 2019-08-10 RX ADMIN — Medication 500 MILLIGRAM(S): at 17:25

## 2019-08-10 RX ADMIN — Medication 325 MILLIGRAM(S): at 17:25

## 2019-08-10 RX ADMIN — Medication 12.5 MILLIGRAM(S): at 18:16

## 2019-08-10 RX ADMIN — HEPARIN SODIUM 5000 UNIT(S): 5000 INJECTION INTRAVENOUS; SUBCUTANEOUS at 21:36

## 2019-08-10 RX ADMIN — ESCITALOPRAM OXALATE 10 MILLIGRAM(S): 10 TABLET, FILM COATED ORAL at 11:56

## 2019-08-10 RX ADMIN — Medication 500 MILLIGRAM(S): at 05:34

## 2019-08-10 RX ADMIN — HEPARIN SODIUM 5000 UNIT(S): 5000 INJECTION INTRAVENOUS; SUBCUTANEOUS at 05:34

## 2019-08-10 RX ADMIN — OXYCODONE AND ACETAMINOPHEN 1 TABLET(S): 5; 325 TABLET ORAL at 18:15

## 2019-08-10 RX ADMIN — Medication 1: at 17:25

## 2019-08-10 RX ADMIN — OXYCODONE AND ACETAMINOPHEN 2 TABLET(S): 5; 325 TABLET ORAL at 22:30

## 2019-08-10 RX ADMIN — Medication 325 MILLIGRAM(S): at 05:34

## 2019-08-10 RX ADMIN — PANTOPRAZOLE SODIUM 40 MILLIGRAM(S): 20 TABLET, DELAYED RELEASE ORAL at 05:34

## 2019-08-10 RX ADMIN — INSULIN GLARGINE 10 UNIT(S): 100 INJECTION, SOLUTION SUBCUTANEOUS at 21:37

## 2019-08-10 RX ADMIN — Medication 1: at 11:59

## 2019-08-10 RX ADMIN — OXYCODONE AND ACETAMINOPHEN 1 TABLET(S): 5; 325 TABLET ORAL at 17:30

## 2019-08-10 RX ADMIN — Medication 81 MILLIGRAM(S): at 11:56

## 2019-08-10 RX ADMIN — ATORVASTATIN CALCIUM 40 MILLIGRAM(S): 80 TABLET, FILM COATED ORAL at 21:36

## 2019-08-10 RX ADMIN — Medication 1 TABLET(S): at 11:56

## 2019-08-10 RX ADMIN — METFORMIN HYDROCHLORIDE 1000 MILLIGRAM(S): 850 TABLET ORAL at 17:25

## 2019-08-10 NOTE — PROGRESS NOTE ADULT - SUBJECTIVE AND OBJECTIVE BOX
Patient is a 59y old  Female who presents with a chief complaint of gait instability, ADL impairments and functional impairments s/p CABG 07/27/19.  Pt still with complaint of tiredness (H/H still low ).  Also notes stiffness of incision area explained to pt through sone that thats expected post surgery and minipulation of area .  BP improve with lower dose of metoprolol as well as lasix discontinued .         Patient seen and examined at bedside.    ALLERGIES:  metronidazole (Rash)  Norvasc (Swelling)    MEDICATIONS  (STANDING):  ascorbic acid 500 milliGRAM(s) Oral two times a day  aspirin  chewable 81 milliGRAM(s) Oral daily  atorvastatin 40 milliGRAM(s) Oral at bedtime  dextrose 5%. 1000 milliLiter(s) (50 mL/Hr) IV Continuous <Continuous>  dextrose 50% Injectable 12.5 Gram(s) IV Push once  dextrose 50% Injectable 25 Gram(s) IV Push once  dextrose 50% Injectable 25 Gram(s) IV Push once  escitalopram 10 milliGRAM(s) Oral daily  ferrous    sulfate 325 milliGRAM(s) Oral two times a day  heparin  Injectable 5000 Unit(s) SubCutaneous every 8 hours  insulin glargine Injectable (LANTUS) 10 Unit(s) SubCutaneous at bedtime  insulin lispro (HumaLOG) corrective regimen sliding scale   SubCutaneous three times a day before meals  insulin lispro (HumaLOG) corrective regimen sliding scale   SubCutaneous at bedtime  metFORMIN 1000 milliGRAM(s) Oral two times a day  metoprolol tartrate 12.5 milliGRAM(s) Oral <User Schedule>  multivitamin 1 Tablet(s) Oral daily  pantoprazole    Tablet 40 milliGRAM(s) Oral before breakfast    MEDICATIONS  (PRN):  acetaminophen   Tablet .. 650 milliGRAM(s) Oral every 6 hours PRN Temp greater or equal to 38C (100.4F), Mild Pain (1 - 3)  dextrose 40% Gel 15 Gram(s) Oral once PRN Blood Glucose LESS THAN 70 milliGRAM(s)/deciliter  docusate sodium 100 milliGRAM(s) Oral two times a day PRN Constipation  famotidine    Tablet 20 milliGRAM(s) Oral daily PRN reflux  glucagon  Injectable 1 milliGRAM(s) IntraMuscular once PRN Glucose LESS THAN 70 milligrams/deciliter  oxyCODONE    5 mG/acetaminophen 325 mG 1 Tablet(s) Oral every 6 hours PRN Moderate Pain (4 - 6)  oxyCODONE    5 mG/acetaminophen 325 mG 2 Tablet(s) Oral every 6 hours PRN Severe Pain (7 - 10)  polyethylene glycol 3350 17 Gram(s) Oral daily PRN Constipation  senna 2 Tablet(s) Oral at bedtime PRN Constipation    Vital Signs Last 24 Hrs  T(F): 97.9 (10 Aug 2019 07:49), Max: 98.7 (09 Aug 2019 20:53)  HR: 69 (10 Aug 2019 07:49) (65 - 73)  BP: 100/64 (10 Aug 2019 07:49) (100/64 - 110/68)  RR: 14 (10 Aug 2019 07:49) (14 - 14)  SpO2: 96% (10 Aug 2019 07:49) (96% - 97%)  I&O's Summary    PHYSICAL EXAM:  General: NAD, A/O x 3  ENT: MMM  Neck: Supple, No JVD  Lungs: Clear to auscultation bilaterally, Non labored breathing   Cardio: RRR, S1/S2, No murmurs  Sternotomy incision clean and intact  Abdomen: Soft, Nontender, Nondistended; Bowel sounds present  Extremities: No calf tenderness, No pitting edema Right lower extremity vein harvest incision clean and intact     LABS:                        8.8    10.98 )-----------( 385      ( 08 Aug 2019 07:25 )             27.4     08-08    136  |  101  |  12  ----------------------------<  180  4.0   |  27  |  0.58    Ca    8.7      08 Aug 2019 07:25  Mg     1.8     08-08      eGFR if Non African American: 100 mL/min/1.73M2 (08-08-19 @ 07:25)  eGFR if African American: 116 mL/min/1.73M2 (08-08-19 @ 07:25)        CARDIAC MARKERS ( 07 Aug 2019 15:35 )  <.017 ng/mL / x     / x     / x     / x          07-26 Chol 128 mg/dL LDL 60 mg/dL HDL 62 mg/dL Trig 81 mg/dL              POCT Blood Glucose.: 150 mg/dL (10 Aug 2019 07:46)  POCT Blood Glucose.: 145 mg/dL (09 Aug 2019 20:56)  POCT Blood Glucose.: 170 mg/dL (09 Aug 2019 17:04)  POCT Blood Glucose.: 147 mg/dL (09 Aug 2019 11:50)    07-26 [id number] 6.9        RADIOLOGY & ADDITIONAL TESTS:    Care Discussed with Consultants/Other Providers:

## 2019-08-10 NOTE — PROGRESS NOTE ADULT - SUBJECTIVE AND OBJECTIVE BOX
CC: Gait dysfunction    Subjective: Patient seen and evaluated at the bedside. Pain improved  No nausea, vomitting, no fever, chills.   No acute events overngiht  Has been tolerating rehabilitation program.    acetaminophen   Tablet .. 650 milliGRAM(s) Oral every 6 hours PRN  ascorbic acid 500 milliGRAM(s) Oral two times a day  aspirin  chewable 81 milliGRAM(s) Oral daily  atorvastatin 40 milliGRAM(s) Oral at bedtime  dextrose 40% Gel 15 Gram(s) Oral once PRN  dextrose 5%. 1000 milliLiter(s) IV Continuous <Continuous>  dextrose 50% Injectable 12.5 Gram(s) IV Push once  dextrose 50% Injectable 25 Gram(s) IV Push once  dextrose 50% Injectable 25 Gram(s) IV Push once  docusate sodium 100 milliGRAM(s) Oral two times a day PRN  escitalopram 10 milliGRAM(s) Oral daily  famotidine    Tablet 20 milliGRAM(s) Oral daily PRN  ferrous    sulfate 325 milliGRAM(s) Oral two times a day  glucagon  Injectable 1 milliGRAM(s) IntraMuscular once PRN  heparin  Injectable 5000 Unit(s) SubCutaneous every 8 hours  insulin glargine Injectable (LANTUS) 10 Unit(s) SubCutaneous at bedtime  insulin lispro (HumaLOG) corrective regimen sliding scale   SubCutaneous three times a day before meals  insulin lispro (HumaLOG) corrective regimen sliding scale   SubCutaneous at bedtime  metFORMIN 1000 milliGRAM(s) Oral two times a day  metoprolol tartrate 12.5 milliGRAM(s) Oral <User Schedule>  multivitamin 1 Tablet(s) Oral daily  oxyCODONE    5 mG/acetaminophen 325 mG 1 Tablet(s) Oral every 6 hours PRN  oxyCODONE    5 mG/acetaminophen 325 mG 2 Tablet(s) Oral every 6 hours PRN  pantoprazole    Tablet 40 milliGRAM(s) Oral before breakfast  polyethylene glycol 3350 17 Gram(s) Oral daily PRN  senna 2 Tablet(s) Oral at bedtime PRN      T(C): 36.6 (08-10-19 @ 07:49), Max: 37.1 (19 @ 20:53)  HR: 69 (08-10-19 @ 07:49) (65 - 73)  BP: 100/64 (08-10-19 @ 07:49) (100/64 - 110/68)  RR: 14 (08-10-19 @ 07:49) (14 - 14)  SpO2: 96% (08-10-19 @ 07:49) (96% - 97%)    Exam:  NAD  HEENT: EOMI  Heart: RRR, S1/2  sternal incision c/d/i  Lungs: CTA bilaterally  Abd: soft ND  Ext: no calf pain  Neuro: exam unchanged      Impression:  Presence of aortocoronary bypass graft  Family history of cancer  Family history of diabetes mellitus (DM)  No pertinent family history in first degree relatives  Handoff  MEWS Score  Gall stone pancreatitis  Low back pain  Diabetes mellitus, type II  Benign hypertension  History of cholecystectomy  H/O:       LUCIAN VARELA is a 59 year old Irish and English speaking female with PMH of HTN, HLD, DM2 who presented 19 with near syncope, found to have 95% left main and 95% RCA lesions now s/p CABG 19 with subsequent gait instability, ADL impairments and functional impairments.    Plan:  - continue medical management as per primary team  - continue PT/OT  - DVT prophylaxis  - CVS stable  - Patient is stable to continue current rehabilitation program

## 2019-08-10 NOTE — PROGRESS NOTE ADULT - ASSESSMENT
59 year old Latvian and English speaking female with PMH of HTN, HLD, DM2 who presented 07/30/19 with near syncope, found to have 95% left main and 95% RCA lesions now s/p CABG 07/27/19 with subsequent gait instability, ADL impairments and functional impairments- pt/ot/dvt ppx, pain meds, Lasix for 3 more days (end 8/4), continue with spironolactone for 3 more days (end on 8/5)    CAD:-ASA    HLD- Atorvastatin    HTN -Metoprolol changed to once daily ,lasix discontinued, stopped aldactone since BP low-- BP improving   EKG reviewed and back to baseline     Diabetes mellitus, type 2- controlled;  c/w LANTUS, ISS, c/w metformin  Hemoglobin A1C, Whole Blood: 6.9    Leukocytosis: ? unclear etiology likely reactive, ,Reviewed cxr on 8/3/19 - no infiltrate, Pt afebrile,   Reviwed results of UA - no growth; continue to monitor Leukocytosis-- cbc in am     Anemia- likely post op blood loss anemia will monitor h/h, will start iron/vit c-  CBC am monday     Pain control -Tylenol PRN, Percocet PRN    Hyperlipidemia- Atorvastatin 40mg qhs    DVT prophylaxis-SubQ heparin    Bowel- prn Colace, Miralax    Mood-Lexapro    c/w current care, will follow

## 2019-08-11 PROCEDURE — 99232 SBSQ HOSP IP/OBS MODERATE 35: CPT

## 2019-08-11 RX ADMIN — OXYCODONE AND ACETAMINOPHEN 2 TABLET(S): 5; 325 TABLET ORAL at 21:47

## 2019-08-11 RX ADMIN — ESCITALOPRAM OXALATE 10 MILLIGRAM(S): 10 TABLET, FILM COATED ORAL at 12:19

## 2019-08-11 RX ADMIN — OXYCODONE AND ACETAMINOPHEN 2 TABLET(S): 5; 325 TABLET ORAL at 22:15

## 2019-08-11 RX ADMIN — METFORMIN HYDROCHLORIDE 1000 MILLIGRAM(S): 850 TABLET ORAL at 08:06

## 2019-08-11 RX ADMIN — ATORVASTATIN CALCIUM 40 MILLIGRAM(S): 80 TABLET, FILM COATED ORAL at 21:46

## 2019-08-11 RX ADMIN — Medication 325 MILLIGRAM(S): at 06:30

## 2019-08-11 RX ADMIN — HEPARIN SODIUM 5000 UNIT(S): 5000 INJECTION INTRAVENOUS; SUBCUTANEOUS at 06:30

## 2019-08-11 RX ADMIN — PANTOPRAZOLE SODIUM 40 MILLIGRAM(S): 20 TABLET, DELAYED RELEASE ORAL at 06:29

## 2019-08-11 RX ADMIN — Medication 1 TABLET(S): at 12:19

## 2019-08-11 RX ADMIN — OXYCODONE AND ACETAMINOPHEN 1 TABLET(S): 5; 325 TABLET ORAL at 13:10

## 2019-08-11 RX ADMIN — HEPARIN SODIUM 5000 UNIT(S): 5000 INJECTION INTRAVENOUS; SUBCUTANEOUS at 21:46

## 2019-08-11 RX ADMIN — INSULIN GLARGINE 10 UNIT(S): 100 INJECTION, SOLUTION SUBCUTANEOUS at 21:46

## 2019-08-11 RX ADMIN — OXYCODONE AND ACETAMINOPHEN 1 TABLET(S): 5; 325 TABLET ORAL at 12:19

## 2019-08-11 RX ADMIN — HEPARIN SODIUM 5000 UNIT(S): 5000 INJECTION INTRAVENOUS; SUBCUTANEOUS at 13:21

## 2019-08-11 RX ADMIN — Medication 500 MILLIGRAM(S): at 06:29

## 2019-08-11 RX ADMIN — METFORMIN HYDROCHLORIDE 1000 MILLIGRAM(S): 850 TABLET ORAL at 17:27

## 2019-08-11 RX ADMIN — Medication 325 MILLIGRAM(S): at 17:29

## 2019-08-11 RX ADMIN — Medication 500 MILLIGRAM(S): at 17:27

## 2019-08-11 RX ADMIN — Medication 81 MILLIGRAM(S): at 12:19

## 2019-08-11 NOTE — PROGRESS NOTE ADULT - SUBJECTIVE AND OBJECTIVE BOX
Patient is a 59y old  Female who presents with a chief complaint of gait instability, ADL impairments and functional impairments s/p CABG 07/27/19.  pt without complaints today doing well.  BP improved.         Patient seen and examined at bedside.    ALLERGIES:  metronidazole (Rash)  Norvasc (Swelling)    MEDICATIONS  (STANDING):  ascorbic acid 500 milliGRAM(s) Oral two times a day  aspirin  chewable 81 milliGRAM(s) Oral daily  atorvastatin 40 milliGRAM(s) Oral at bedtime  dextrose 5%. 1000 milliLiter(s) (50 mL/Hr) IV Continuous <Continuous>  dextrose 50% Injectable 12.5 Gram(s) IV Push once  dextrose 50% Injectable 25 Gram(s) IV Push once  dextrose 50% Injectable 25 Gram(s) IV Push once  escitalopram 10 milliGRAM(s) Oral daily  ferrous    sulfate 325 milliGRAM(s) Oral two times a day  heparin  Injectable 5000 Unit(s) SubCutaneous every 8 hours  insulin glargine Injectable (LANTUS) 10 Unit(s) SubCutaneous at bedtime  insulin lispro (HumaLOG) corrective regimen sliding scale   SubCutaneous three times a day before meals  insulin lispro (HumaLOG) corrective regimen sliding scale   SubCutaneous at bedtime  metFORMIN 1000 milliGRAM(s) Oral two times a day  metoprolol tartrate 12.5 milliGRAM(s) Oral <User Schedule>  multivitamin 1 Tablet(s) Oral daily  pantoprazole    Tablet 40 milliGRAM(s) Oral before breakfast    MEDICATIONS  (PRN):  acetaminophen   Tablet .. 650 milliGRAM(s) Oral every 6 hours PRN Temp greater or equal to 38C (100.4F), Mild Pain (1 - 3)  dextrose 40% Gel 15 Gram(s) Oral once PRN Blood Glucose LESS THAN 70 milliGRAM(s)/deciliter  docusate sodium 100 milliGRAM(s) Oral two times a day PRN Constipation  famotidine    Tablet 20 milliGRAM(s) Oral daily PRN reflux  glucagon  Injectable 1 milliGRAM(s) IntraMuscular once PRN Glucose LESS THAN 70 milligrams/deciliter  oxyCODONE    5 mG/acetaminophen 325 mG 1 Tablet(s) Oral every 6 hours PRN Moderate Pain (4 - 6)  oxyCODONE    5 mG/acetaminophen 325 mG 2 Tablet(s) Oral every 6 hours PRN Severe Pain (7 - 10)  polyethylene glycol 3350 17 Gram(s) Oral daily PRN Constipation  senna 2 Tablet(s) Oral at bedtime PRN Constipation    Vital Signs Last 24 Hrs  T(F): 97.9 (11 Aug 2019 07:47), Max: 98.5 (10 Aug 2019 21:49)  HR: 69 (11 Aug 2019 07:47) (69 - 72)  BP: 117/68 (11 Aug 2019 07:47) (100/60 - 117/68)  RR: 14 (11 Aug 2019 07:47) (14 - 14)  SpO2: 96% (11 Aug 2019 07:47) (96% - 97%)  I&O's Summary    PHYSICAL EXAM:  General: NAD, A/O x 3  ENT: MMM  Neck: Supple, No JVD  Lungs: Clear to auscultation bilaterally, Non labored breathing   Cardio: RRR, S1/S2, No murmurs  sternotomy incision intact and dry   Abdomen: Soft, Nontender, Nondistended; Bowel sounds present  Extremities: No calf tenderness, No pitting edema Right lower ext incision clean and intact     LABS:                    07-26 Chol 128 mg/dL LDL 60 mg/dL HDL 62 mg/dL Trig 81 mg/dL              POCT Blood Glucose.: 142 mg/dL (11 Aug 2019 07:45)  POCT Blood Glucose.: 144 mg/dL (10 Aug 2019 21:35)  POCT Blood Glucose.: 182 mg/dL (10 Aug 2019 17:19)  POCT Blood Glucose.: 157 mg/dL (10 Aug 2019 11:55)    07-26 [id number] 6.9        RADIOLOGY & ADDITIONAL TESTS:    Care Discussed with Consultants/Other Providers:

## 2019-08-11 NOTE — PROGRESS NOTE ADULT - ASSESSMENT
59 year old Khmer and English speaking female with PMH of HTN, HLD, DM2 who presented 07/30/19 with near syncope, found to have 95% left main and 95% RCA lesions now s/p CABG 07/27/19 with subsequent gait instability, ADL impairments and functional impairments- pt/ot/dvt ppx, pain meds, Lasix for 3 more days (end 8/4), continue with spironolactone for 3 more days (end on 8/5)    CAD:-ASA    HLD- Atorvastatin    HTN -Metoprolol changed to once daily ,lasix discontinued, stopped aldactone since BP low-- BP stablizing   EKG reviewed and back to baseline     Diabetes mellitus, type 2- controlled;  c/w LANTUS, ISS, c/w metformin  Hemoglobin A1C, Whole Blood: 6.9    Leukocytosis: ? unclear etiology likely reactive, ,Reviewed cxr on 8/3/19 - no infiltrate, Pt afebrile,   Reviwed results of UA - no growth; continue to monitor Leukocytosis-- cbc in monday     Anemia- likely post op blood loss anemia will monitor h/h, will start iron/vit c-  CBC am monday     Pain control -Tylenol PRN, Percocet PRN    Hyperlipidemia- Atorvastatin 40mg qhs    DVT prophylaxis-SubQ heparin    Bowel- prn Colace, Miralax    Mood-Lexapro    c/w current care, will follow

## 2019-08-11 NOTE — PROGRESS NOTE ADULT - SUBJECTIVE AND OBJECTIVE BOX
CC: Gait dysfunction    Subjective: Patient seen and evaluated at the bedside. Pain improved.  No nausea, vomitting, no fever, chills.   No acute events overngiht  Has been tolerating rehabilitation program.    acetaminophen   Tablet .. 650 milliGRAM(s) Oral every 6 hours PRN  ascorbic acid 500 milliGRAM(s) Oral two times a day  aspirin  chewable 81 milliGRAM(s) Oral daily  atorvastatin 40 milliGRAM(s) Oral at bedtime  dextrose 40% Gel 15 Gram(s) Oral once PRN  dextrose 5%. 1000 milliLiter(s) IV Continuous <Continuous>  dextrose 50% Injectable 12.5 Gram(s) IV Push once  dextrose 50% Injectable 25 Gram(s) IV Push once  dextrose 50% Injectable 25 Gram(s) IV Push once  docusate sodium 100 milliGRAM(s) Oral two times a day PRN  escitalopram 10 milliGRAM(s) Oral daily  famotidine    Tablet 20 milliGRAM(s) Oral daily PRN  ferrous    sulfate 325 milliGRAM(s) Oral two times a day  glucagon  Injectable 1 milliGRAM(s) IntraMuscular once PRN  heparin  Injectable 5000 Unit(s) SubCutaneous every 8 hours  insulin glargine Injectable (LANTUS) 10 Unit(s) SubCutaneous at bedtime  insulin lispro (HumaLOG) corrective regimen sliding scale   SubCutaneous three times a day before meals  insulin lispro (HumaLOG) corrective regimen sliding scale   SubCutaneous at bedtime  metFORMIN 1000 milliGRAM(s) Oral two times a day  metoprolol tartrate 12.5 milliGRAM(s) Oral <User Schedule>  multivitamin 1 Tablet(s) Oral daily  oxyCODONE    5 mG/acetaminophen 325 mG 1 Tablet(s) Oral every 6 hours PRN  oxyCODONE    5 mG/acetaminophen 325 mG 2 Tablet(s) Oral every 6 hours PRN  pantoprazole    Tablet 40 milliGRAM(s) Oral before breakfast  polyethylene glycol 3350 17 Gram(s) Oral daily PRN  senna 2 Tablet(s) Oral at bedtime PRN    Vital Signs Last 24 Hrs  T(C): 36.6 (11 Aug 2019 07:47), Max: 36.9 (10 Aug 2019 21:49)  T(F): 97.9 (11 Aug 2019 07:47), Max: 98.5 (10 Aug 2019 21:49)  HR: 69 (11 Aug 2019 07:47) (69 - 72)  BP: 117/68 (11 Aug 2019 07:47) (100/60 - 117/68)  BP(mean): --  RR: 14 (11 Aug 2019 07:47) (14 - 14)  SpO2: 96% (11 Aug 2019 07:47) (96% - 97%)    Exam:  NAD  HEENT: EOMI  Heart: RRR, S1/2  sternal incision c/d/i  Lungs: CTA bilaterally  Abd: soft ND  Ext: no calf pain  Neuro: exam unchanged    Impression:  Presence of aortocoronary bypass graft  Family history of cancer  Family history of diabetes mellitus (DM)  No pertinent family history in first degree relatives  Handoff  MEWS Score  Gall stone pancreatitis  Low back pain  Diabetes mellitus, type II  Benign hypertension  History of cholecystectomy  H/O:       LUCIAN VARELA is a 59 year old English and English speaking female with PMH of HTN, HLD, DM2 who presented 19 with near syncope, found to have 95% left main and 95% RCA lesions now s/p CABG 19 with subsequent gait instability, ADL impairments and functional impairments.    Plan:  - continue medical management as per primary team  - continue PT/OT  - DVT prophylaxis  - CVS stable  - Patient is stable to continue current rehabilitation program

## 2019-08-11 NOTE — CHART NOTE - NSCHARTNOTEFT_GEN_A_CORE
This patient had lactic acidosis, hyperglycemia requiring Insulin drip, hypophosphatemia requiring Kphos replacement, and dilutional drop in hematocrit now resolved.

## 2019-08-12 LAB
ANION GAP SERPL CALC-SCNC: 11 MMOL/L — SIGNIFICANT CHANGE UP (ref 5–17)
BASOPHILS # BLD AUTO: 0.04 K/UL — SIGNIFICANT CHANGE UP (ref 0–0.2)
BASOPHILS NFR BLD AUTO: 0.5 % — SIGNIFICANT CHANGE UP (ref 0–2)
BUN SERPL-MCNC: 12 MG/DL — SIGNIFICANT CHANGE UP (ref 7–23)
CALCIUM SERPL-MCNC: 9 MG/DL — SIGNIFICANT CHANGE UP (ref 8.4–10.5)
CHLORIDE SERPL-SCNC: 94 MMOL/L — LOW (ref 96–108)
CO2 SERPL-SCNC: 26 MMOL/L — SIGNIFICANT CHANGE UP (ref 22–31)
CREAT SERPL-MCNC: 0.6 MG/DL — SIGNIFICANT CHANGE UP (ref 0.5–1.3)
EOSINOPHIL # BLD AUTO: 0.26 K/UL — SIGNIFICANT CHANGE UP (ref 0–0.5)
EOSINOPHIL NFR BLD AUTO: 3.5 % — SIGNIFICANT CHANGE UP (ref 0–6)
GLUCOSE SERPL-MCNC: 127 MG/DL — HIGH (ref 70–99)
HCT VFR BLD CALC: 29.7 % — LOW (ref 34.5–45)
HGB BLD-MCNC: 9.6 G/DL — LOW (ref 11.5–15.5)
IMM GRANULOCYTES NFR BLD AUTO: 1 % — SIGNIFICANT CHANGE UP (ref 0–1.5)
LYMPHOCYTES # BLD AUTO: 1.21 K/UL — SIGNIFICANT CHANGE UP (ref 1–3.3)
LYMPHOCYTES # BLD AUTO: 16.4 % — SIGNIFICANT CHANGE UP (ref 13–44)
MAGNESIUM SERPL-MCNC: 1.7 MG/DL — SIGNIFICANT CHANGE UP (ref 1.6–2.6)
MCHC RBC-ENTMCNC: 30.1 PG — SIGNIFICANT CHANGE UP (ref 27–34)
MCHC RBC-ENTMCNC: 32.3 GM/DL — SIGNIFICANT CHANGE UP (ref 32–36)
MCV RBC AUTO: 93.1 FL — SIGNIFICANT CHANGE UP (ref 80–100)
MONOCYTES # BLD AUTO: 0.8 K/UL — SIGNIFICANT CHANGE UP (ref 0–0.9)
MONOCYTES NFR BLD AUTO: 10.9 % — SIGNIFICANT CHANGE UP (ref 2–14)
NEUTROPHILS # BLD AUTO: 4.98 K/UL — SIGNIFICANT CHANGE UP (ref 1.8–7.4)
NEUTROPHILS NFR BLD AUTO: 67.7 % — SIGNIFICANT CHANGE UP (ref 43–77)
NRBC # BLD: 0 /100 WBCS — SIGNIFICANT CHANGE UP (ref 0–0)
PLATELET # BLD AUTO: 438 K/UL — HIGH (ref 150–400)
POTASSIUM SERPL-MCNC: 4.2 MMOL/L — SIGNIFICANT CHANGE UP (ref 3.5–5.3)
POTASSIUM SERPL-SCNC: 4.2 MMOL/L — SIGNIFICANT CHANGE UP (ref 3.5–5.3)
RBC # BLD: 3.19 M/UL — LOW (ref 3.8–5.2)
RBC # FLD: 16.2 % — HIGH (ref 10.3–14.5)
SODIUM SERPL-SCNC: 131 MMOL/L — LOW (ref 135–145)
WBC # BLD: 7.36 K/UL — SIGNIFICANT CHANGE UP (ref 3.8–10.5)
WBC # FLD AUTO: 7.36 K/UL — SIGNIFICANT CHANGE UP (ref 3.8–10.5)

## 2019-08-12 PROCEDURE — 99232 SBSQ HOSP IP/OBS MODERATE 35: CPT

## 2019-08-12 RX ORDER — BACITRACIN ZINC 500 UNIT/G
1 OINTMENT IN PACKET (EA) TOPICAL
Refills: 0 | Status: DISCONTINUED | OUTPATIENT
Start: 2019-08-12 | End: 2019-08-17

## 2019-08-12 RX ORDER — ACETAMINOPHEN 500 MG
650 TABLET ORAL EVERY 6 HOURS
Refills: 0 | Status: DISCONTINUED | OUTPATIENT
Start: 2019-08-12 | End: 2019-08-17

## 2019-08-12 RX ADMIN — Medication 650 MILLIGRAM(S): at 17:20

## 2019-08-12 RX ADMIN — ATORVASTATIN CALCIUM 40 MILLIGRAM(S): 80 TABLET, FILM COATED ORAL at 22:11

## 2019-08-12 RX ADMIN — Medication 325 MILLIGRAM(S): at 17:20

## 2019-08-12 RX ADMIN — HEPARIN SODIUM 5000 UNIT(S): 5000 INJECTION INTRAVENOUS; SUBCUTANEOUS at 22:11

## 2019-08-12 RX ADMIN — METFORMIN HYDROCHLORIDE 1000 MILLIGRAM(S): 850 TABLET ORAL at 10:10

## 2019-08-12 RX ADMIN — Medication 12.5 MILLIGRAM(S): at 17:20

## 2019-08-12 RX ADMIN — HEPARIN SODIUM 5000 UNIT(S): 5000 INJECTION INTRAVENOUS; SUBCUTANEOUS at 15:13

## 2019-08-12 RX ADMIN — Medication 1 APPLICATION(S): at 17:21

## 2019-08-12 RX ADMIN — Medication 1 TABLET(S): at 11:59

## 2019-08-12 RX ADMIN — Medication 650 MILLIGRAM(S): at 17:21

## 2019-08-12 RX ADMIN — INSULIN GLARGINE 10 UNIT(S): 100 INJECTION, SOLUTION SUBCUTANEOUS at 22:12

## 2019-08-12 RX ADMIN — FAMOTIDINE 20 MILLIGRAM(S): 10 INJECTION INTRAVENOUS at 22:13

## 2019-08-12 RX ADMIN — PANTOPRAZOLE SODIUM 40 MILLIGRAM(S): 20 TABLET, DELAYED RELEASE ORAL at 05:47

## 2019-08-12 RX ADMIN — Medication 500 MILLIGRAM(S): at 17:20

## 2019-08-12 RX ADMIN — Medication 500 MILLIGRAM(S): at 05:47

## 2019-08-12 RX ADMIN — Medication 81 MILLIGRAM(S): at 11:59

## 2019-08-12 RX ADMIN — HEPARIN SODIUM 5000 UNIT(S): 5000 INJECTION INTRAVENOUS; SUBCUTANEOUS at 05:47

## 2019-08-12 RX ADMIN — METFORMIN HYDROCHLORIDE 1000 MILLIGRAM(S): 850 TABLET ORAL at 17:20

## 2019-08-12 RX ADMIN — Medication 325 MILLIGRAM(S): at 05:47

## 2019-08-12 RX ADMIN — ESCITALOPRAM OXALATE 10 MILLIGRAM(S): 10 TABLET, FILM COATED ORAL at 11:59

## 2019-08-12 NOTE — PROGRESS NOTE ADULT - CARDIOVASCULAR COMMENTS
c/o itching and pinching sensation at incision site where dried glue is. Denies chest heaviness or pressure. +sternal incision site C/D/I. surgical glue dry. No erythema, swelling or discharge. Drain site with dressing in place C/D/I.

## 2019-08-12 NOTE — PROGRESS NOTE ADULT - ASSESSMENT
ASSESSMENT/PLAN  Pt is a 59 year old Yakut and English speaking female with PMH of HTN, HLD, DM2 who presented 07/30/19 with near syncope, found to have 95% left main and 95% RCA lesions now s/p CABG 07/27/19 with subsequent gait instability, ADL impairments and functional impairments.    #S/p CABG 7/27/19  - Continue with comprehensive rehab program OT, PT  - post-surgical support bra provided 8/11 - chest discomfort improving, appears to be more post surgical pain, troponin negative. Continue to monitor  - Strict intake/output, daily weights	  - Betadyne and gauze dressing to surgical drain site. Will f/u with CTS NP regarding timing of removing suture.   - Outpt f/u with Dr. Pallazo, f/u re: suture removal date     #CAD:  -ASA, Atorvastatin 40mg qhs  -reduce Metoprolol 12.5mg qhs.   -Cardiology oupt f/u with Dr. Juárez     #HTN  - decrease Metoprolol 12.5mg qhs as patient noted to have episodes of hypotension and bradycardia throughout the day.   - BP 8/12 (96/63 - 112/66)  - Per son, previously on multiple antihypertensive medications. Requesting cardiology consult  - 8/7 Discontinued Furosemide 20mg PO daily. /66 - 117/65 this AM, continue to monitor closely  - daily weights.     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 Goals for mod indepnedent bADLs and transfers, toileting, self care skills. Pain management., advance from walker to cane if possible. Target dc home 8/17/19 with [facility] for wound care, home Pt and OT    LABS;  CBC, BMP 8/12, reviewed  neuropsychology  monitor BP  cardiology consult ASSESSMENT/PLAN  Pt is a 59 year old Belarusian and English speaking female with PMH of HTN, HLD, DM2 who presented 07/30/19 with near syncope, found to have 95% left main and 95% RCA lesions now s/p CABG 07/27/19 with subsequent gait instability, ADL impairments and functional impairments.    #S/p CABG 7/27/19  - Continue with comprehensive rehab program OT, PT  - post-surgical support bra provided 8/11 - chest discomfort improving, appears to be more post surgical pain, troponin negative. Continue to monitor  - Strict intake/output, daily weights	  - Betadyne and gauze dressing to surgical drain site. Will f/u with CTS NP regarding timing of removing suture.   - Outpt f/u with Dr. Pallazo on dc    #CAD:  -ASA, Atorvastatin 40mg qhs, metoprolol  -Cardiology oupt f/u with Dr. Juárez     #HTN  - decrease Metoprolol 12.5mg qhs as patient noted to have episodes of hypotension and bradycardia throughout the day.   - BP 8/12 (96/63 - 112/66)  - Per son, previously on multiple antihypertensive medications. Requesting cardiology consult for optimizing management  - 8/7 Discontinued Furosemide 20mg PO daily. /66 - 117/65 this AM, continue to monitor closely  - daily weights.     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 Goals for mod indepnedent bADLs and transfers, toileting, self care skills. Pain management., advance from walker to cane if possible. Target dc home 8/17/19 with VNS for wound care, home Pt and OT    LABS;  CBC, BMP 8/12, reviewed  cardiology consult

## 2019-08-12 NOTE — PROGRESS NOTE ADULT - GENERAL COMMENTS
Patient seen with son present. Chest heaviness and pain improving. Complains of some gas. Patient seen with son present. Chest heaviness and pain improving. Complains of some midline abdominal pain, likely gas, also mildly constipated. No distress

## 2019-08-12 NOTE — PROGRESS NOTE ADULT - SUBJECTIVE AND OBJECTIVE BOX
Patient is a 59y old  Female who presents with a chief complaint of gait instability, ADL impairments and functional impairments s/p CABG 19 (11 Aug 2019 09:41)      HPI:  Pt is a 59 year old Thai and English speaking female with PMH of HTN, HLD, DM2 who presented to [facility] ED  with near syncope after arguing with her daughter. Patient also had acute onset nausea, diaphoresis, palpitations, dizziness, and mild chest pressure. NO chest pain, SOB, AGARWAL, PND, orthopnea, increased lower extremity edema, fever/chills, cough. The patient had cath at [facility] which showed 95% left main and 95% RCA lesions and she was transferred to [facility] for CABG on  with Dr. Ramey. Hospital course sig. for chest tube placement (DC'd ), RLE ghassan (DC'd ), small R thigh hematoma () and afebrile leukocytosis to 11 (). Admitted to [facility] for gait instability, ADL impairments and functional impairments s/p CABG. (02 Aug 2019 12:13)      SUBJECTIVE: Patient seen and examined. Son present. States that chest heaviness and pain have been improving. Complains of itching where the surgical glue at incision site is beginning to flake off. Complains of some gas this morning. Has daily BMs. Agreeable to decreasing oxycodone use due to GI side effects. No other complaints.       PAST MEDICAL & SURGICAL HISTORY:  Gall stone pancreatitis: S/P laparoscopic cholecystectomy  Low back pain  Diabetes mellitus, type II  Benign hypertension  History of cholecystectomy: laparoscopic cholecystectomy  H/O:       MEDICATIONS  (STANDING):  ascorbic acid 500 milliGRAM(s) Oral two times a day  aspirin  chewable 81 milliGRAM(s) Oral daily  atorvastatin 40 milliGRAM(s) Oral at bedtime  BACItracin   Ointment 1 Application(s) Topical two times a day  dextrose 5%. 1000 milliLiter(s) (50 mL/Hr) IV Continuous <Continuous>  dextrose 50% Injectable 12.5 Gram(s) IV Push once  dextrose 50% Injectable 25 Gram(s) IV Push once  dextrose 50% Injectable 25 Gram(s) IV Push once  escitalopram 10 milliGRAM(s) Oral daily  ferrous    sulfate 325 milliGRAM(s) Oral two times a day  heparin  Injectable 5000 Unit(s) SubCutaneous every 8 hours  insulin glargine Injectable (LANTUS) 10 Unit(s) SubCutaneous at bedtime  insulin lispro (HumaLOG) corrective regimen sliding scale   SubCutaneous three times a day before meals  insulin lispro (HumaLOG) corrective regimen sliding scale   SubCutaneous at bedtime  metFORMIN 1000 milliGRAM(s) Oral two times a day  metoprolol tartrate 12.5 milliGRAM(s) Oral <User Schedule>  multivitamin 1 Tablet(s) Oral daily  pantoprazole    Tablet 40 milliGRAM(s) Oral before breakfast    MEDICATIONS  (PRN):  acetaminophen   Tablet .. 650 milliGRAM(s) Oral every 6 hours PRN Temp greater or equal to 38C (100.4F), Mild Pain (1 - 3)  dextrose 40% Gel 15 Gram(s) Oral once PRN Blood Glucose LESS THAN 70 milliGRAM(s)/deciliter  docusate sodium 100 milliGRAM(s) Oral two times a day PRN Constipation  famotidine    Tablet 20 milliGRAM(s) Oral daily PRN reflux  glucagon  Injectable 1 milliGRAM(s) IntraMuscular once PRN Glucose LESS THAN 70 milligrams/deciliter  oxyCODONE    5 mG/acetaminophen 325 mG 1 Tablet(s) Oral every 6 hours PRN Moderate Pain (4 - 6)  oxyCODONE    5 mG/acetaminophen 325 mG 2 Tablet(s) Oral every 6 hours PRN Severe Pain (7 - 10)  polyethylene glycol 3350 17 Gram(s) Oral daily PRN Constipation  senna 2 Tablet(s) Oral at bedtime PRN Constipation      Allergies    metronidazole (Rash)    Intolerances    Norvasc (Swelling)        VITALS  59y  Vital Signs Last 24 Hrs  T(C): 36.7 (12 Aug 2019 07:51), Max: 36.7 (12 Aug 2019 07:51)  T(F): 98 (12 Aug 2019 07:51), Max: 98 (12 Aug 2019 07:51)  HR: 79 (12 Aug 2019 07:51) (79 - 83)  BP: 110/70 (12 Aug 2019 07:51) (96/63 - 112/66)  BP(mean): --  RR: 14 (12 Aug 2019 07:51) (14 - 14)  SpO2: 98% (12 Aug 2019 07:51) (98% - 98%)  Daily     Daily Weight in k.2 (12 Aug 2019 05:49)        RECENT LABS:                          9.6    7.36  )-----------( 438      ( 12 Aug 2019 06:05 )             29.7     0812    131<L>  |  94<L>  |  12  ----------------------------<  127<H>  4.2   |  26  |  0.60    Ca    9.0      12 Aug 2019 06:05  Mg     1.7                     CAPILLARY BLOOD GLUCOSE      POCT Blood Glucose.: 131 mg/dL (12 Aug 2019 07:45)  POCT Blood Glucose.: 173 mg/dL (11 Aug 2019 21:43)  POCT Blood Glucose.: 135 mg/dL (11 Aug 2019 16:28)  POCT Blood Glucose.: 129 mg/dL (11 Aug 2019 11:47) Patient is a 59y old  Female who presents with a chief complaint of gait instability, ADL impairments and functional impairments s/p CABG 19 (11 Aug 2019 09:41)      HPI:  Pt is a 59 year old Setswana and English speaking female with PMH of HTN, HLD, DM2 who presented to [facility] ED  with near syncope after arguing with her daughter. Patient also had acute onset nausea, diaphoresis, palpitations, dizziness, and mild chest pressure. NO chest pain, SOB, AGARWAL, PND, orthopnea, increased lower extremity edema, fever/chills, cough. The patient had cath at [facility] which showed 95% left main and 95% RCA lesions and she was transferred to [facility] for CABG on  with Dr. Ramey. Hospital course sig. for chest tube placement (DC'd ), RLE ghassan (DC'd ), small R thigh hematoma () and afebrile leukocytosis to 11 (). Admitted to [facility] for gait instability, ADL impairments and functional impairments s/p CABG. (02 Aug 2019 12:13)      SUBJECTIVE: Patient seen and examined. Son present. States that chest heaviness and pain have been improving. Complains of itching where the surgical glue at incision site is beginning to flake off. Complains of some gas this morning. Has daily BMs. Agreeable to decreasing oxycodone use due to GI side effects. No other complaints.       PAST MEDICAL & SURGICAL HISTORY:  Gall stone pancreatitis: S/P laparoscopic cholecystectomy  Low back pain  Diabetes mellitus, type II  Benign hypertension  History of cholecystectomy: laparoscopic cholecystectomy  H/O:       MEDICATIONS  (STANDING):  ascorbic acid 500 milliGRAM(s) Oral two times a day  aspirin  chewable 81 milliGRAM(s) Oral daily  atorvastatin 40 milliGRAM(s) Oral at bedtime  BACItracin   Ointment 1 Application(s) Topical two times a day  dextrose 5%. 1000 milliLiter(s) (50 mL/Hr) IV Continuous <Continuous>  dextrose 50% Injectable 12.5 Gram(s) IV Push once  dextrose 50% Injectable 25 Gram(s) IV Push once  dextrose 50% Injectable 25 Gram(s) IV Push once  escitalopram 10 milliGRAM(s) Oral daily  ferrous    sulfate 325 milliGRAM(s) Oral two times a day  heparin  Injectable 5000 Unit(s) SubCutaneous every 8 hours  insulin glargine Injectable (LANTUS) 10 Unit(s) SubCutaneous at bedtime  insulin lispro (HumaLOG) corrective regimen sliding scale   SubCutaneous three times a day before meals  insulin lispro (HumaLOG) corrective regimen sliding scale   SubCutaneous at bedtime  metFORMIN 1000 milliGRAM(s) Oral two times a day  metoprolol tartrate 12.5 milliGRAM(s) Oral <User Schedule>  multivitamin 1 Tablet(s) Oral daily  pantoprazole    Tablet 40 milliGRAM(s) Oral before breakfast    MEDICATIONS  (PRN):  acetaminophen   Tablet .. 650 milliGRAM(s) Oral every 6 hours PRN Temp greater or equal to 38C (100.4F), Mild Pain (1 - 3)  dextrose 40% Gel 15 Gram(s) Oral once PRN Blood Glucose LESS THAN 70 milliGRAM(s)/deciliter  docusate sodium 100 milliGRAM(s) Oral two times a day PRN Constipation  famotidine    Tablet 20 milliGRAM(s) Oral daily PRN reflux  glucagon  Injectable 1 milliGRAM(s) IntraMuscular once PRN Glucose LESS THAN 70 milligrams/deciliter  oxyCODONE    5 mG/acetaminophen 325 mG 1 Tablet(s) Oral every 6 hours PRN Moderate Pain (4 - 6)  oxyCODONE    5 mG/acetaminophen 325 mG 2 Tablet(s) Oral every 6 hours PRN Severe Pain (7 - 10)  polyethylene glycol 3350 17 Gram(s) Oral daily PRN Constipation  senna 2 Tablet(s) Oral at bedtime PRN Constipation      Allergies    metronidazole (Rash)    Intolerances    Norvasc (Swelling)        VITALS  59y  Vital Signs Last 24 Hrs  T(C): 36.7 (12 Aug 2019 07:51), Max: 36.7 (12 Aug 2019 07:51)  T(F): 98 (12 Aug 2019 07:51), Max: 98 (12 Aug 2019 07:51)  HR: 79 (12 Aug 2019 07:51) (79 - 83)  BP: 110/70 (12 Aug 2019 07:51) (96/63 - 112/66)  BP(mean): --  RR: 14 (12 Aug 2019 07:51) (14 - 14)  SpO2: 98% (12 Aug 2019 07:51) (98% - 98%)  Daily     Daily Weight in k.2 (12 Aug 2019 05:49)        RECENT LABS:                          9.6    7.36  )-----------( 438      ( 12 Aug 2019 06:05 )             29.7     0812    131<L>  |  94<L>  |  12  ----------------------------<  127<H>  4.2   |  26  |  0.60    Ca    9.0      12 Aug 2019 06:05  Mg     1.7                     CAPILLARY BLOOD GLUCOSE      POCT Blood Glucose.: 131 mg/dL (12 Aug 2019 07:45)  POCT Blood Glucose.: 173 mg/dL (11 Aug 2019 21:43)  POCT Blood Glucose.: 135 mg/dL (11 Aug 2019 16:28)  POCT Blood Glucose.: 129 mg/dL (11 Aug 2019 11:47)

## 2019-08-12 NOTE — PROGRESS NOTE ADULT - NEGATIVE GASTROINTESTINAL SYMPTOMS
no diarrhea/no nausea/no vomiting
Has Bm but requires stool softeners/laxatives. complains of mild gas and reflux, which preceded surgery

## 2019-08-13 LAB
ANION GAP SERPL CALC-SCNC: 8 MMOL/L — SIGNIFICANT CHANGE UP (ref 5–17)
BUN SERPL-MCNC: 9 MG/DL — SIGNIFICANT CHANGE UP (ref 7–23)
CALCIUM SERPL-MCNC: 9.1 MG/DL — SIGNIFICANT CHANGE UP (ref 8.4–10.5)
CHLORIDE SERPL-SCNC: 96 MMOL/L — SIGNIFICANT CHANGE UP (ref 96–108)
CO2 SERPL-SCNC: 26 MMOL/L — SIGNIFICANT CHANGE UP (ref 22–31)
CREAT SERPL-MCNC: 0.6 MG/DL — SIGNIFICANT CHANGE UP (ref 0.5–1.3)
GLUCOSE SERPL-MCNC: 139 MG/DL — HIGH (ref 70–99)
POTASSIUM SERPL-MCNC: 4.5 MMOL/L — SIGNIFICANT CHANGE UP (ref 3.5–5.3)
POTASSIUM SERPL-SCNC: 4.5 MMOL/L — SIGNIFICANT CHANGE UP (ref 3.5–5.3)
SODIUM SERPL-SCNC: 130 MMOL/L — LOW (ref 135–145)

## 2019-08-13 PROCEDURE — 99232 SBSQ HOSP IP/OBS MODERATE 35: CPT

## 2019-08-13 RX ORDER — OXYCODONE AND ACETAMINOPHEN 5; 325 MG/1; MG/1
1 TABLET ORAL EVERY 6 HOURS
Refills: 0 | Status: DISCONTINUED | OUTPATIENT
Start: 2019-08-13 | End: 2019-08-15

## 2019-08-13 RX ORDER — SIMETHICONE 80 MG/1
80 TABLET, CHEWABLE ORAL DAILY
Refills: 0 | Status: DISCONTINUED | OUTPATIENT
Start: 2019-08-13 | End: 2019-08-17

## 2019-08-13 RX ADMIN — HEPARIN SODIUM 5000 UNIT(S): 5000 INJECTION INTRAVENOUS; SUBCUTANEOUS at 13:26

## 2019-08-13 RX ADMIN — Medication 1 APPLICATION(S): at 06:31

## 2019-08-13 RX ADMIN — Medication 1: at 11:51

## 2019-08-13 RX ADMIN — METFORMIN HYDROCHLORIDE 1000 MILLIGRAM(S): 850 TABLET ORAL at 17:15

## 2019-08-13 RX ADMIN — INSULIN GLARGINE 10 UNIT(S): 100 INJECTION, SOLUTION SUBCUTANEOUS at 21:43

## 2019-08-13 RX ADMIN — Medication 325 MILLIGRAM(S): at 17:15

## 2019-08-13 RX ADMIN — ATORVASTATIN CALCIUM 40 MILLIGRAM(S): 80 TABLET, FILM COATED ORAL at 21:44

## 2019-08-13 RX ADMIN — OXYCODONE AND ACETAMINOPHEN 1 TABLET(S): 5; 325 TABLET ORAL at 06:53

## 2019-08-13 RX ADMIN — PANTOPRAZOLE SODIUM 40 MILLIGRAM(S): 20 TABLET, DELAYED RELEASE ORAL at 06:31

## 2019-08-13 RX ADMIN — HEPARIN SODIUM 5000 UNIT(S): 5000 INJECTION INTRAVENOUS; SUBCUTANEOUS at 06:31

## 2019-08-13 RX ADMIN — HEPARIN SODIUM 5000 UNIT(S): 5000 INJECTION INTRAVENOUS; SUBCUTANEOUS at 21:44

## 2019-08-13 RX ADMIN — Medication 500 MILLIGRAM(S): at 06:30

## 2019-08-13 RX ADMIN — Medication 12.5 MILLIGRAM(S): at 17:15

## 2019-08-13 RX ADMIN — ESCITALOPRAM OXALATE 10 MILLIGRAM(S): 10 TABLET, FILM COATED ORAL at 11:41

## 2019-08-13 RX ADMIN — Medication 1 APPLICATION(S): at 18:12

## 2019-08-13 RX ADMIN — Medication 1 TABLET(S): at 11:41

## 2019-08-13 RX ADMIN — Medication 1: at 16:58

## 2019-08-13 RX ADMIN — Medication 1: at 07:50

## 2019-08-13 RX ADMIN — Medication 650 MILLIGRAM(S): at 18:14

## 2019-08-13 RX ADMIN — Medication 81 MILLIGRAM(S): at 11:41

## 2019-08-13 RX ADMIN — SIMETHICONE 80 MILLIGRAM(S): 80 TABLET, CHEWABLE ORAL at 18:11

## 2019-08-13 RX ADMIN — Medication 650 MILLIGRAM(S): at 17:19

## 2019-08-13 RX ADMIN — OXYCODONE AND ACETAMINOPHEN 1 TABLET(S): 5; 325 TABLET ORAL at 07:01

## 2019-08-13 RX ADMIN — METFORMIN HYDROCHLORIDE 1000 MILLIGRAM(S): 850 TABLET ORAL at 06:30

## 2019-08-13 RX ADMIN — Medication 325 MILLIGRAM(S): at 06:30

## 2019-08-13 RX ADMIN — Medication 500 MILLIGRAM(S): at 17:15

## 2019-08-13 NOTE — CHART NOTE - NSCHARTNOTEFT_GEN_A_CORE
Patient seen in bed this morning for suture removal to drain site that is distal to sternal incision. Chlorahexidine used to cleanse skin. 3 sutures removed without complication. Drain site healing well. No drainage, margins approximating well. Steristrips applied.

## 2019-08-13 NOTE — PROGRESS NOTE ADULT - ASSESSMENT
ASSESSMENT/PLAN  Pt is a 59 year old Amharic and English speaking female with PMH of HTN, HLD, DM2 who presented 07/30/19 with near syncope, found to have 95% left main and 95% RCA lesions now s/p CABG 07/27/19 with subsequent gait instability, ADL impairments and functional impairments.    #S/p CABG 7/27/19  - Continue with comprehensive rehab program OT, PT  - post-surgical support bra PRN  - Strict intake/output, daily weights	  - Outpt f/u with Dr. Pallazo on dc    #CAD:  -ASA, Atorvastatin 40mg qhs, metoprolol  -Cardiology oupt f/u with Dr. Juárez     #HTN  - decrease Metoprolol 12.5mg qhs as patient noted to have episodes of hypotension and bradycardia throughout the day. furosemide dc earlier in stay  - BP 8/13  (94/60 - 116/83)  - cardiology consult pending  - daily weights.     #Diabetes mellitus, type II  -Humalog 2u pre-meal, SSI  -Metformin 1000mg PO bid    #[id number] 8/13  -dc salt restriction in diet. BMP in AM 8/14, if drops further, consider SIADH work up. Meds reviewed      #Anemia, improving  -Monitor CBC-->9.6 (8/12)    #Pain control  -Tylenol PRN, Percocet PRN. 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 Goals for mod indepnedent bADLs and transfers, toileting, self care skills. Pain management., advance from walker to cane if possible. [employer] dc home 8/17/19 with [facility]NS for wound care, home Pt and OT    LABS;  BMP 8/14  cardiology consult

## 2019-08-13 NOTE — PROGRESS NOTE ADULT - SUBJECTIVE AND OBJECTIVE BOX
Patient is a 59y old  Female who presents with a chief complaint of gait instability, ADL impairments and functional impairments s/p CABG 19 (12 Aug 2019 11:38)      HPI:  Pt is a 59 year old Swedish and English speaking female with PMH of HTN, HLD, DM2 who presented to [facility] ED  with near syncope after arguing with her daughter. Patient also had acute onset nausea, diaphoresis, palpitations, dizziness, and mild chest pressure. NO chest pain, SOB, AGARWAL, PND, orthopnea, increased lower extremity edema, fever/chills, cough. The patient had cath at [facility] which showed 95% left main and 95% RCA lesions and she was transferred to [facility] for CABG on  with Dr. Ramey. Hospital course sig. for chest tube placement (DC'd ), RLE ghassan (DC'd ), small R thigh hematoma () and afebrile leukocytosis to 11 (). Admitted to [facility] for gait instability, ADL impairments and functional impairments s/p CABG. (02 Aug 2019 12:13)      PAST MEDICAL & SURGICAL HISTORY:  Gall stone pancreatitis: S/P laparoscopic cholecystectomy  Low back pain  Diabetes mellitus, type II  Benign hypertension  History of cholecystectomy: laparoscopic cholecystectomy  H/O:       MEDICATIONS  (STANDING):  ascorbic acid 500 milliGRAM(s) Oral two times a day  aspirin  chewable 81 milliGRAM(s) Oral daily  atorvastatin 40 milliGRAM(s) Oral at bedtime  BACItracin   Ointment 1 Application(s) Topical two times a day  dextrose 5%. 1000 milliLiter(s) (50 mL/Hr) IV Continuous <Continuous>  dextrose 50% Injectable 12.5 Gram(s) IV Push once  dextrose 50% Injectable 25 Gram(s) IV Push once  dextrose 50% Injectable 25 Gram(s) IV Push once  escitalopram 10 milliGRAM(s) Oral daily  ferrous    sulfate 325 milliGRAM(s) Oral two times a day  heparin  Injectable 5000 Unit(s) SubCutaneous every 8 hours  insulin glargine Injectable (LANTUS) 10 Unit(s) SubCutaneous at bedtime  insulin lispro (HumaLOG) corrective regimen sliding scale   SubCutaneous three times a day before meals  insulin lispro (HumaLOG) corrective regimen sliding scale   SubCutaneous at bedtime  metFORMIN 1000 milliGRAM(s) Oral two times a day  metoprolol tartrate 12.5 milliGRAM(s) Oral <User Schedule>  multivitamin 1 Tablet(s) Oral daily  pantoprazole    Tablet 40 milliGRAM(s) Oral before breakfast    MEDICATIONS  (PRN):  acetaminophen   Tablet .. 650 milliGRAM(s) Oral every 6 hours PRN Temp greater or equal to 38C (100.4F), Mild Pain (1 - 3), Moderate Pain (4 - 6)  dextrose 40% Gel 15 Gram(s) Oral once PRN Blood Glucose LESS THAN 70 milliGRAM(s)/deciliter  docusate sodium 100 milliGRAM(s) Oral two times a day PRN Constipation  famotidine    Tablet 20 milliGRAM(s) Oral daily PRN reflux  glucagon  Injectable 1 milliGRAM(s) IntraMuscular once PRN Glucose LESS THAN 70 milligrams/deciliter  oxyCODONE    5 mG/acetaminophen 325 mG 1 Tablet(s) Oral every 6 hours PRN Moderate Pain (4 - 6)  oxyCODONE    5 mG/acetaminophen 325 mG 2 Tablet(s) Oral every 6 hours PRN Severe Pain (7 - 10)  polyethylene glycol 3350 17 Gram(s) Oral daily PRN Constipation  senna 2 Tablet(s) Oral at bedtime PRN Constipation      Allergies    metronidazole (Rash)    Intolerances    Norvasc (Swelling)        VITALS  59y  Vital Signs Last 24 Hrs  T(C): 36.7 (13 Aug 2019 07:39), Max: 36.7 (12 Aug 2019 20:22)  T(F): 98.1 (13 Aug 2019 07:39), Max: 98.1 (13 Aug 2019 07:39)  HR: 65 (13 Aug 2019 07:39) (65 - 71)  BP: 94/60 (13 Aug 2019 07:39) (94/60 - 116/83)  BP(mean): --  RR: 14 (13 Aug 2019 07:39) (14 - 14)  SpO2: 96% (13 Aug 2019 07:39) (96% - 96%)  Daily     Daily Weight in k.4 (13 Aug 2019 06:51)        RECENT LABS:                          9.6    7.36  )-----------( 438      ( 12 Aug 2019 06:05 )             29.7     08-13    130<L>  |  96  |  9   ----------------------------<  139<H>  4.5   |  26  |  0.60    Ca    9.1      13 Aug 2019 05:40  Mg     1.7     0812                CAPILLARY BLOOD GLUCOSE      POCT Blood Glucose.: 170 mg/dL (13 Aug 2019 11:47)  POCT Blood Glucose.: 160 mg/dL (13 Aug 2019 07:48)  POCT Blood Glucose.: 148 mg/dL (12 Aug 2019 22:10)  POCT Blood Glucose.: 130 mg/dL (12 Aug 2019 17:17)  POCT Blood Glucose.: 134 mg/dL (12 Aug 2019 11:58)        Review of Systems:   · Negative General Symptoms	no fever; no chills	  · General Comments	Patient seen with assistance language line [facility] [place] [id number]  Patient 's mood looks much improved, states she ambulated 7 feet without walker today. Needed percocet this morning for pain but took tylenol last night. also had some reflux symptoms last night, ate dinner 5 PM. +BM this morning, resolved	  		  · Negative Respiratory and Thorax Symptoms	as above	  · Negative Cardiovascular Symptoms	no chest pain; no palpitations; no dyspnea on exertion; no claudication +sternal pain,controlled mostly on tylenol	  · Cardiovascular Comments	p[ruritus and discomfort greatly improved after suture removal. patient looks much happier	  · Negative Gastrointestinal Symptoms	no nausea; no vomiting; no diarrhea	  · Gastrointestinal Comments	BM . +belching occaisonally	    Physical Exam:   · Constitutional	detailed exam	  · Constitutional Details	no distress	  · Constitutional Comments	alert, mood fair. no respiratory distress, speakingcomfortably, low volume,	  · Respiratory	detailed exam	  · Respiratory Details	respirations non-labored; clear to auscultation bilaterally; no wheezes	  · Cardiovascular	detailed exam	  · Cardiovascular Details	regular rate and rhythm	  · Cardiovascular Comments	+sternal incision site C/D/I. surgical glue dry. No erythema, swelling or discharge.   drain site with suture removed. +Steri strips in place	  · Gastrointestinal	detailed exam	  · GI Normal	soft; nontender; no distention; bowel sounds normal	  · Extremities	detailed exam	  · Extremities Details	no pedal edema	  · Extremities Comments	BL calves nontender to palpation no redness or warmth +soft, NT

## 2019-08-14 LAB
ANION GAP SERPL CALC-SCNC: 9 MMOL/L — SIGNIFICANT CHANGE UP (ref 5–17)
BUN SERPL-MCNC: 8 MG/DL — SIGNIFICANT CHANGE UP (ref 7–23)
CALCIUM SERPL-MCNC: 8.9 MG/DL — SIGNIFICANT CHANGE UP (ref 8.4–10.5)
CHLORIDE SERPL-SCNC: 97 MMOL/L — SIGNIFICANT CHANGE UP (ref 96–108)
CO2 SERPL-SCNC: 26 MMOL/L — SIGNIFICANT CHANGE UP (ref 22–31)
CREAT SERPL-MCNC: 0.53 MG/DL — SIGNIFICANT CHANGE UP (ref 0.5–1.3)
GLUCOSE SERPL-MCNC: 155 MG/DL — HIGH (ref 70–99)
POTASSIUM SERPL-MCNC: 4.2 MMOL/L — SIGNIFICANT CHANGE UP (ref 3.5–5.3)
POTASSIUM SERPL-SCNC: 4.2 MMOL/L — SIGNIFICANT CHANGE UP (ref 3.5–5.3)
SODIUM SERPL-SCNC: 132 MMOL/L — LOW (ref 135–145)

## 2019-08-14 PROCEDURE — 99233 SBSQ HOSP IP/OBS HIGH 50: CPT

## 2019-08-14 PROCEDURE — 99222 1ST HOSP IP/OBS MODERATE 55: CPT

## 2019-08-14 PROCEDURE — 99232 SBSQ HOSP IP/OBS MODERATE 35: CPT

## 2019-08-14 RX ORDER — FUROSEMIDE 40 MG
40 TABLET ORAL DAILY
Refills: 0 | Status: DISCONTINUED | OUTPATIENT
Start: 2019-08-14 | End: 2019-08-15

## 2019-08-14 RX ORDER — OXYCODONE AND ACETAMINOPHEN 5; 325 MG/1; MG/1
2 TABLET ORAL EVERY 6 HOURS
Refills: 0 | Status: DISCONTINUED | OUTPATIENT
Start: 2019-08-14 | End: 2019-08-15

## 2019-08-14 RX ADMIN — Medication 1: at 07:57

## 2019-08-14 RX ADMIN — Medication 81 MILLIGRAM(S): at 12:02

## 2019-08-14 RX ADMIN — Medication 650 MILLIGRAM(S): at 05:43

## 2019-08-14 RX ADMIN — ESCITALOPRAM OXALATE 10 MILLIGRAM(S): 10 TABLET, FILM COATED ORAL at 12:02

## 2019-08-14 RX ADMIN — PANTOPRAZOLE SODIUM 40 MILLIGRAM(S): 20 TABLET, DELAYED RELEASE ORAL at 05:36

## 2019-08-14 RX ADMIN — METFORMIN HYDROCHLORIDE 1000 MILLIGRAM(S): 850 TABLET ORAL at 17:41

## 2019-08-14 RX ADMIN — HEPARIN SODIUM 5000 UNIT(S): 5000 INJECTION INTRAVENOUS; SUBCUTANEOUS at 15:03

## 2019-08-14 RX ADMIN — METFORMIN HYDROCHLORIDE 1000 MILLIGRAM(S): 850 TABLET ORAL at 05:36

## 2019-08-14 RX ADMIN — Medication 650 MILLIGRAM(S): at 08:53

## 2019-08-14 RX ADMIN — Medication 500 MILLIGRAM(S): at 05:36

## 2019-08-14 RX ADMIN — Medication 1 APPLICATION(S): at 05:36

## 2019-08-14 RX ADMIN — SIMETHICONE 80 MILLIGRAM(S): 80 TABLET, CHEWABLE ORAL at 12:02

## 2019-08-14 RX ADMIN — Medication 1: at 12:01

## 2019-08-14 RX ADMIN — Medication 500 MILLIGRAM(S): at 17:39

## 2019-08-14 RX ADMIN — Medication 650 MILLIGRAM(S): at 15:06

## 2019-08-14 RX ADMIN — Medication 1 TABLET(S): at 12:02

## 2019-08-14 RX ADMIN — OXYCODONE AND ACETAMINOPHEN 2 TABLET(S): 5; 325 TABLET ORAL at 21:18

## 2019-08-14 RX ADMIN — Medication 650 MILLIGRAM(S): at 15:08

## 2019-08-14 RX ADMIN — HEPARIN SODIUM 5000 UNIT(S): 5000 INJECTION INTRAVENOUS; SUBCUTANEOUS at 21:15

## 2019-08-14 RX ADMIN — Medication 1 APPLICATION(S): at 17:40

## 2019-08-14 RX ADMIN — Medication 325 MILLIGRAM(S): at 05:36

## 2019-08-14 RX ADMIN — OXYCODONE AND ACETAMINOPHEN 2 TABLET(S): 5; 325 TABLET ORAL at 20:19

## 2019-08-14 RX ADMIN — INSULIN GLARGINE 10 UNIT(S): 100 INJECTION, SOLUTION SUBCUTANEOUS at 21:16

## 2019-08-14 RX ADMIN — Medication 325 MILLIGRAM(S): at 17:40

## 2019-08-14 RX ADMIN — FAMOTIDINE 20 MILLIGRAM(S): 10 INJECTION INTRAVENOUS at 20:19

## 2019-08-14 RX ADMIN — Medication 1 APPLICATION(S): at 17:42

## 2019-08-14 RX ADMIN — ATORVASTATIN CALCIUM 40 MILLIGRAM(S): 80 TABLET, FILM COATED ORAL at 21:16

## 2019-08-14 RX ADMIN — HEPARIN SODIUM 5000 UNIT(S): 5000 INJECTION INTRAVENOUS; SUBCUTANEOUS at 05:36

## 2019-08-14 NOTE — PROGRESS NOTE ADULT - ASSESSMENT
59 year old Faroese and English speaking female with PMH of HTN, HLD, DM2 who presented 07/30/19 with near syncope, found to have 95% left main and 95% RCA lesions now s/p CABG 07/27/19 with subsequent gait instability, ADL impairments and functional impairments- pt/ot/dvt ppx, pain meds, Lasix for 3 more days (end 8/4), continue with spironolactone for 3 more days (end on 8/5)    CAD:-ASA    HLD- Atorvastatin    pleural effusion on cxr, increased will need lasix     HTN -Metoprolol with parameters    Diabetes mellitus, type 2- LANTUS  , ISS, metformin  Hemoglobin A1C, Whole Blood: 6.9    Anemia- likely post op blood loss anemia will monitor h/h, will start iron/vit c    Pain control -Tylenol PRN, Percocet PRN    Hyperlipidemia- Atorvastatin 40mg qhs    DVT prophylaxis-SubQ heparin    Bowel- prn Colace, Miralax    Mood-Lexapro    c/w current care, will follow 59 year old Thai and English speaking female with PMH of HTN, HLD, DM2 who presented 07/30/19 with near syncope, found to have 95% left main and 95% RCA lesions now s/p CABG 07/27/19 with subsequent gait instability, ADL impairments and functional impairments- pt/ot/dvt ppx, pain meds, Lasix for 3 more days (end 8/4), continue with spironolactone for 3 more days (end on 8/5)    CAD:-ASA    HLD- Atorvastatin    pleural effusion on cxr, increased will need lasix     HTN - d/c Metoprolol per cardiology, 2/2 borderline low bp and no signs of hypoperfusion    Diabetes mellitus, type 2- LANTUS  , ISS, metformin  Hemoglobin A1C, Whole Blood: 6.9    Anemia- likely post op blood loss anemia will monitor h/h, will start iron/vit c    Pain control -Tylenol PRN, Percocet PRN    Hyperlipidemia- Atorvastatin 40mg qhs    DVT prophylaxis-SubQ heparin    Bowel- prn Colace, Miralax    Mood-Lexapro    c/w current care, will follow

## 2019-08-14 NOTE — PROGRESS NOTE ADULT - SUBJECTIVE AND OBJECTIVE BOX
Patient is a 59y old  Female who presents with a chief complaint of gait instability, ADL impairments and functional impairments s/p CABG 19 (12 Aug 2019 11:38)      HPI:  Pt is a 59 year old Belarusian and English speaking female with PMH of HTN, HLD, DM2 who presented to [facility] ED  with near syncope after arguing with her daughter. Patient also had acute onset nausea, diaphoresis, palpitations, dizziness, and mild chest pressure. NO chest pain, SOB, AGARWAL, PND, orthopnea, increased lower extremity edema, fever/chills, cough. The patient had cath at [facility] which showed 95% left main and 95% RCA lesions and she was transferred to [facility] for CABG on  with Dr. Ramey. Hospital course sig. for chest tube placement (DC'd ), RLE ghassan (DC'd ), small R thigh hematoma () and afebrile leukocytosis to 11 (). Admitted to [facility] for gait instability, ADL impairments and functional impairments s/p CABG. (02 Aug 2019 12:13)      PAST MEDICAL & SURGICAL HISTORY:  Gall stone pancreatitis: S/P laparoscopic cholecystectomy  Low back pain  Diabetes mellitus, type II  Benign hypertension  History of cholecystectomy: laparoscopic cholecystectomy  H/O:     MEDICATIONS  (STANDING):  ascorbic acid 500 milliGRAM(s) Oral two times a day  aspirin  chewable 81 milliGRAM(s) Oral daily  atorvastatin 40 milliGRAM(s) Oral at bedtime  BACItracin   Ointment 1 Application(s) Topical two times a day  dextrose 5%. 1000 milliLiter(s) (50 mL/Hr) IV Continuous <Continuous>  dextrose 50% Injectable 12.5 Gram(s) IV Push once  dextrose 50% Injectable 25 Gram(s) IV Push once  dextrose 50% Injectable 25 Gram(s) IV Push once  escitalopram 10 milliGRAM(s) Oral daily  ferrous    sulfate 325 milliGRAM(s) Oral two times a day  heparin  Injectable 5000 Unit(s) SubCutaneous every 8 hours  insulin glargine Injectable (LANTUS) 10 Unit(s) SubCutaneous at bedtime  insulin lispro (HumaLOG) corrective regimen sliding scale   SubCutaneous three times a day before meals  insulin lispro (HumaLOG) corrective regimen sliding scale   SubCutaneous at bedtime  metFORMIN 1000 milliGRAM(s) Oral two times a day  metoprolol tartrate 12.5 milliGRAM(s) Oral <User Schedule>  multivitamin 1 Tablet(s) Oral daily  pantoprazole    Tablet 40 milliGRAM(s) Oral before breakfast  simethicone 80 milliGRAM(s) Chew daily    MEDICATIONS  (PRN):  acetaminophen   Tablet .. 650 milliGRAM(s) Oral every 6 hours PRN Temp greater or equal to 38C (100.4F), Mild Pain (1 - 3), Moderate Pain (4 - 6)  dextrose 40% Gel 15 Gram(s) Oral once PRN Blood Glucose LESS THAN 70 milliGRAM(s)/deciliter  docusate sodium 100 milliGRAM(s) Oral two times a day PRN Constipation  famotidine    Tablet 20 milliGRAM(s) Oral daily PRN reflux  glucagon  Injectable 1 milliGRAM(s) IntraMuscular once PRN Glucose LESS THAN 70 milligrams/deciliter  oxyCODONE    5 mG/acetaminophen 325 mG 1 Tablet(s) Oral every 6 hours PRN Moderate Pain (4 - 6)  oxyCODONE    5 mG/acetaminophen 325 mG 2 Tablet(s) Oral every 6 hours PRN Severe Pain (7 - 10)  polyethylene glycol 3350 17 Gram(s) Oral daily PRN Constipation  senna 2 Tablet(s) Oral at bedtime PRN Constipation      Allergies  metronidazole (Rash)  Intolerances  Norvasc (Swelling)        VITALS  Vital Signs Last 24 Hrs  T(C): 36.9 (13 Aug 2019 20:42), Max: 36.9 (13 Aug 2019 20:42)  T(F): 98.5 (13 Aug 2019 20:42), Max: 98.5 (13 Aug 2019 20:42)  HR: 73 (14 Aug 2019 05:44) (67 - 76)  BP: 122/72 (14 Aug 2019 05:44) (110/64 - 122/72)  RR: 14 (13 Aug 2019 20:42) (14 - 14)  SpO2: 96% (13 Aug 2019 20:42) (96% - 96%)        RECENT LABS:                          9.6    7.36  )-----------( 438      ( 12 Aug 2019 06:05 )             29.7     08-13    08-14    132<L>  |  97  |  8   ----------------------------<  155<H>  4.2   |  26  |  0.53    Ca    8.9      14 Aug 2019 05:40      Mg     1.7     0812                CAPILLARY BLOOD GLUCOSE  POCT  Blood Glucose (19 @ 07:27)    POCT Blood Glucose.: 157 mg/dL  POCT  Blood Glucose (19 @ 21:42)    POCT Blood Glucose.: 153 mg/dL  POCT  Blood Glucose (19 @ 16:43)    POCT Blood Glucose.: 168 mg/dL    POCT Blood Glucose.: 170 mg/dL (13 Aug 2019 11:47)  POCT Blood Glucose.: 160 mg/dL (13 Aug 2019 07:48)  POCT Blood Glucose.: 148 mg/dL (12 Aug 2019 22:10)  POCT Blood Glucose.: 130 mg/dL (12 Aug 2019 17:17)  POCT Blood Glucose.: 134 mg/dL (12 Aug 2019 11:58)        Review of Systems:   · Negative General Symptoms	no fever; no chills	  · General Comments	  Denies CP, reports heaviness around incision still present but stable. Denies SOB, HA, dizziness.  +BM yesterday. 	  		  · Negative Respiratory and Thorax Symptoms	as above	  · Negative Cardiovascular Symptoms	no chest pain; no palpitations; no dyspnea on exertion; no claudication +sternal pain,controlled mostly on tylenol	  · Cardiovascular Comments	pruritus and discomfort greatly improved after suture removal. 	  · Negative Gastrointestinal Symptoms	no nausea; no vomiting; no diarrhea	  · Gastrointestinal Comments	BM . +belching occaisonally	    Physical Exam:   · Constitutional	detailed exam	  · Constitutional Details	no distress	  · Constitutional Comments	alert, mood fair. no respiratory distress, speaking comfortably, low volume,	  · Respiratory	detailed exam	  · Respiratory Details	respirations non-labored; clear to auscultation bilaterally; no wheezes	  · Cardiovascular	detailed exam	  · Cardiovascular Details	regular rate and rhythm	  · Cardiovascular Comments	+sternal incision site C/D/I. surgical glue dry. No erythema, swelling or discharge from sternal incision. Minimal drainage from drain site distal to sternal incision. +Steri strips in place	  · Gastrointestinal	detailed exam	  · GI Normal	soft; nontender; no distention; bowel sounds normal	  · Extremities	detailed exam	  · Extremities Details	no pedal edema	  · Extremities Comments	BL calves nontender to palpation no redness or warmth +soft, NT Patient is a 59y old  Female who presents with a chief complaint of gait instability, ADL impairments and functional impairments s/p CABG 19 (12 Aug 2019 11:38)      HPI:  Pt is a 59 year old Kazakh and English speaking female with PMH of HTN, HLD, DM2 who presented to [facility] ED  with near syncope after arguing with her daughter. Patient also had acute onset nausea, diaphoresis, palpitations, dizziness, and mild chest pressure. NO chest pain, SOB, AGARWAL, PND, orthopnea, increased lower extremity edema, fever/chills, cough. The patient had cath at [facility] which showed 95% left main and 95% RCA lesions and she was transferred to [facility] for CABG on  with Dr. Ramey. Hospital course sig. for chest tube placement (DC'd ), RLE ghassan (DC'd ), small R thigh hematoma () and afebrile leukocytosis to 11 (). Admitted to [facility] for gait instability, ADL impairments and functional impairments s/p CABG. (02 Aug 2019 12:13)      PAST MEDICAL & SURGICAL HISTORY:  Gall stone pancreatitis: S/P laparoscopic cholecystectomy  Low back pain  Diabetes mellitus, type II  Benign hypertension  History of cholecystectomy: laparoscopic cholecystectomy  H/O:     MEDICATIONS  (STANDING):  ascorbic acid 500 milliGRAM(s) Oral two times a day  aspirin  chewable 81 milliGRAM(s) Oral daily  atorvastatin 40 milliGRAM(s) Oral at bedtime  BACItracin   Ointment 1 Application(s) Topical two times a day  dextrose 5%. 1000 milliLiter(s) (50 mL/Hr) IV Continuous <Continuous>  dextrose 50% Injectable 12.5 Gram(s) IV Push once  dextrose 50% Injectable 25 Gram(s) IV Push once  dextrose 50% Injectable 25 Gram(s) IV Push once  escitalopram 10 milliGRAM(s) Oral daily  ferrous    sulfate 325 milliGRAM(s) Oral two times a day  heparin  Injectable 5000 Unit(s) SubCutaneous every 8 hours  insulin glargine Injectable (LANTUS) 10 Unit(s) SubCutaneous at bedtime  insulin lispro (HumaLOG) corrective regimen sliding scale   SubCutaneous three times a day before meals  insulin lispro (HumaLOG) corrective regimen sliding scale   SubCutaneous at bedtime  metFORMIN 1000 milliGRAM(s) Oral two times a day  metoprolol tartrate 12.5 milliGRAM(s) Oral <User Schedule>  multivitamin 1 Tablet(s) Oral daily  pantoprazole    Tablet 40 milliGRAM(s) Oral before breakfast  simethicone 80 milliGRAM(s) Chew daily    MEDICATIONS  (PRN):  acetaminophen   Tablet .. 650 milliGRAM(s) Oral every 6 hours PRN Temp greater or equal to 38C (100.4F), Mild Pain (1 - 3), Moderate Pain (4 - 6)  dextrose 40% Gel 15 Gram(s) Oral once PRN Blood Glucose LESS THAN 70 milliGRAM(s)/deciliter  docusate sodium 100 milliGRAM(s) Oral two times a day PRN Constipation  famotidine    Tablet 20 milliGRAM(s) Oral daily PRN reflux  glucagon  Injectable 1 milliGRAM(s) IntraMuscular once PRN Glucose LESS THAN 70 milligrams/deciliter  oxyCODONE    5 mG/acetaminophen 325 mG 1 Tablet(s) Oral every 6 hours PRN Moderate Pain (4 - 6)  oxyCODONE    5 mG/acetaminophen 325 mG 2 Tablet(s) Oral every 6 hours PRN Severe Pain (7 - 10)  polyethylene glycol 3350 17 Gram(s) Oral daily PRN Constipation  senna 2 Tablet(s) Oral at bedtime PRN Constipation      Allergies  metronidazole (Rash)  Intolerances  Norvasc (Swelling)        VITALS  Vital Signs Last 24 Hrs  T(C): 36.9 (13 Aug 2019 20:42), Max: 36.9 (13 Aug 2019 20:42)  T(F): 98.5 (13 Aug 2019 20:42), Max: 98.5 (13 Aug 2019 20:42)  HR: 73 (14 Aug 2019 05:44) (67 - 76)  BP: 122/72 (14 Aug 2019 05:44) (110/64 - 122/72)  RR: 14 (13 Aug 2019 20:42) (14 - 14)  SpO2: 96% (13 Aug 2019 20:42) (96% - 96%)        RECENT LABS:                          9.6    7.36  )-----------( 438      ( 12 Aug 2019 06:05 )             29.7     08-13    08-14    132<L>  |  97  |  8   ----------------------------<  155<H>  4.2   |  26  |  0.53    Ca    8.9      14 Aug 2019 05:40      Mg     1.7     08-12                CAPILLARY BLOOD GLUCOSE  POCT  Blood Glucose (19 @ 07:27)    POCT Blood Glucose.: 157 mg/dL  POCT  Blood Glucose (19 @ 21:42)    POCT Blood Glucose.: 153 mg/dL  POCT  Blood Glucose (19 @ 16:43)    POCT Blood Glucose.: 168 mg/dL    POCT Blood Glucose.: 170 mg/dL (13 Aug 2019 11:47)  POCT Blood Glucose.: 160 mg/dL (13 Aug 2019 07:48)  POCT Blood Glucose.: 148 mg/dL (12 Aug 2019 22:10)  POCT Blood Glucose.: 130 mg/dL (12 Aug 2019 17:17)  POCT Blood Glucose.: 134 mg/dL (12 Aug 2019 11:58)        Review of Systems:   · Negative General Symptoms	no fever; no chills	  · General Comments	  Denies CP, reports heaviness around incision still present but stable. Denies SOB, HA, dizziness.  +BM yesterday.   Patient looks much more improved today. Seen with son at bedside, defers use of language line  in Kazakh, sontranslating  Does have history of fungal infection feet, some recurrence, takes clotrimazole at home	  		  · Negative Respiratory and Thorax Symptoms	as above	  · Negative Cardiovascular Symptoms	no chest pain; no palpitations; no dyspnea on exertion; no claudication +sternal pain,controlled mostly on tylenol	  · Cardiovascular Comments	pruritus and discomfort greatly improved after suture removal. 	  · Negative Gastrointestinal Symptoms	no nausea; no vomiting; no diarrhea	  · Gastrointestinal Comments	BM . +belching occaisonally	    Physical Exam:   · Constitutional	detailed exam	  · Constitutional Details	no distress	  · Constitutional Comments	alert, mood fair. no respiratory distress, speaking comfortably, low volume,	  · Respiratory	detailed exam	  · Respiratory Details	respirations non-labored; REDUCED breath sounds left base	  · Cardiovascular	detailed exam	  · Cardiovascular Details	regular rate and rhythm	  · Cardiovascular Comments	+sternal incision site C/D/I. surgical glue dry. No erythema, swelling or discharge from sternal incision. Minimal drainage from drain site distal to sternal incision. +Steri strips in place	  · Gastrointestinal	detailed exam	  · GI Normal	soft; nontender; no distention; bowel sounds normal	  · Extremities	detailed exam	  · Extremities Details	no pedal edema	  · Extremities Comments	BL calves nontender to palpation no redness or warmth +soft, NT small area fungal rash medial portion foot and smallspot lateral foot

## 2019-08-14 NOTE — PROGRESS NOTE ADULT - ASSESSMENT
ASSESSMENT/PLAN  Pt is a 59 year old Ukrainian and English speaking female with PMH of HTN, HLD, DM2 who presented 07/30/19 with near syncope, found to have 95% left main and 95% RCA lesions now s/p CABG 07/27/19 with subsequent gait instability, ADL impairments and functional impairments.    #S/p CABG 7/27/19  - Continue with comprehensive rehab program OT, PT  - post-surgical support bra PRN  - Strict intake/output, daily weights (daily weights are stable)	  - Outpt f/u with Dr. Pallazo on dc    #CAD:  -ASA, Atorvastatin 40mg qhs, metoprolol 12.5mg qHS  -Cardiology oupt f/u with Dr. Juárez     #HTN  - decrease Metoprolol 12.5mg qhs as patient noted to have episodes of hypotension and bradycardia throughout the day. - - - off furosemide (discontinued earlier in stay)  - BP over the last 24 hours : 94/60 - 122/72  - cardiology consult pending  - daily weights.     #Diabetes mellitus, type II  -Humalog 2u pre-meal, SSI  -Metformin 1000mg PO bid    #[id number] 8/13  -dc'd salt restriction in diet on 8/13. BMP with stable Na this am (132 up from 130), if drops further, consider SIADH work up. Meds reviewed.      #Anemia, improving  -Monitor CBC-->9.6 (8/12)  -CBC ordered for 8/15    #Pain control  -Tylenol PRN, Percocet PRN. continue taper as tolerated, patient aware    #Hyperlipidemia  -Atorvastatin 40mg qhs    #GERD  - Protonix daily   -simethicone daily   -sit up at least 30 minutes post meals    #Mood  -Lexapro  -neuropsychology evaluation mood, supportive counseling 8/6    #Nutrition  -Regular diet Consistent carb/no snack  -NO salt restriction    -#DVT PPx:   Heparin, SCDs, TEDs    #Case discussed in IDT rounds 8/8:  Patient was primary caregiver for her , and will need to be in mod independent level on dc. Goals for mod indepnedent bADLs and transfers, toileting, self care skills. Pain management., advance from walker to cane if possible. [facility] dc home 8/17/19 with VNS for wound care, home Pt and OT    Pending:   CBC and BMP in am  cardiology consult ASSESSMENT/PLAN  Pt is a 59 year old [place] and English speaking female with PMH of HTN, HLD, DM2 who presented 07/30/19 with near syncope, found to have 95% left main and 95% RCA lesions now s/p CABG 07/27/19 with subsequent gait instability, ADL impairments and functional impairments.    #S/p CABG 7/27/19  - Continue with comprehensive rehab program OT, PT  - post-surgical support bra PRN with improvement  - Strict intake/output, daily weights (daily weights are stable)	  - Outpt f/u with Dr. Pallazo on dc    #CAD:  -ASA, Atorvastatin 40mg qhs, metoprolol 12.5mg qHS  -Cardiology oupt f/u with Dr. Juárez     #reduced breath sounds left base on exam, O2 sat stable, no distress  -CXrr/o pleural effusion 8/14  -conitnue incentive spiroemeter, discussed with patient and son    #HTN  - decrease Metoprolol 12.5mg qhs as patient noted to have episodes of hypotension and bradycardia throughout the day. - - - off furosemide (discontinued earlier in stay)  BP improved today, stable 8/14  - cardiology consult pending  - daily weights.     #Diabetes mellitus, type II  -Humalog 2u pre-meal, SSI  -Metformin 1000mg PO bid    #hyponatremia  -dc'd salt restriction in diet on 8/13.   -BMP with stable Na this am (132 up from 130), if drops further, consider SIADH work up. Meds reviewed.      #Anemia, improving  -Monitor CBC-->9.6 (8/12)  -CBC ordered for 8/15    #Pain control  -Tylenol PRN, Percocet PRN. continue taper as tolerated, patient aware    #Hyperlipidemia  -Atorvastatin 40mg qhs    #GERD  - Protonix daily   -simethicone daily   -sit up at least 30 minutes post meals    #derm  clotrimazole bid ordered for fungal rash    #Mood  -Lexapro  -neuropsychology evaluation mood, supportive counseling 8/6    #Nutrition  -Regular diet Consistent carb/no snack  -NO salt restriction    -#DVT PPx:   Heparin, SCDs, TEDs    #Case discussed in IDT rounds 8/8:  Patient was primary caregiver for her , and will need to be in mod independent level on dc. Goals for mod indepnedent bADLs and transfers, toileting, self care skills. Pain management., advance from walker to cane if possible. [facility] dc home 8/17/19 with VNS for wound care, home Pt and OT    Pending:   CBC and BMP in am 8/15  CXR  cardiology consult

## 2019-08-14 NOTE — PROGRESS NOTE ADULT - ASSESSMENT
59 year old woman on acute rehab s/p recent CABG for severe CAD.  Medical problems include DM, HTN and HLD.  She has had intermittent borderline low BP with no signs of hypoperfusion    Plan  - discontinue metoprolol  - continue ASA and statin  - encourage PO intake  - monitor labs intermittently  - no contraindication to continue rehab    discussed with patient and with NP Garnett

## 2019-08-14 NOTE — PROGRESS NOTE ADULT - NSHPATTENDINGPLANDISCUSS_GEN_ALL_CORE
Milly Garnett NP; patient; son
Macarena Aguilera DO; Milly Garnett, NP
Macarena Aguilera DO; patient; PT
Macarena Aguilera DO; patient; son; OT
Macarena Aguilera DO; patient

## 2019-08-14 NOTE — CHART NOTE - NSCHARTNOTEFT_GEN_A_CORE
Nutrition Follow Up Note  Hospital Course   (Per Electronic Medical Record):     Source:   Family [X]   Patient [X]  Medical Record [X]      Diet:   Consistent Carbohydrate Diet w/ Thin Liquids  Tolerates Diet Well  No Chewing/Swallowing Difficulties  No Recent Vomiting, Diarrhea or Constipation  Consumes 50-80% of Meals (as Per Documentation) - States Fair Intake   on Glucerna 8oz PO Daily - Patient Takes Nutrition Supplement   Family Requests Glucerna 8oz PO TID  Recommend Change Glucerna to Glucerna 8oz PO TID (Provides 660kcal-30grams of Protein)   Obtained Food Preferences from Patient     Enteral/Parenteral Nutrition: N/A    Current Weight: 130.9lb on 8/13  Obtain Weights Daily  Weights Currently Stable @This Time    Pertinent Medications: MEDICATIONS  (STANDING):  ascorbic acid 500 milliGRAM(s) Oral two times a day  aspirin  chewable 81 milliGRAM(s) Oral daily  atorvastatin 40 milliGRAM(s) Oral at bedtime  BACItracin   Ointment 1 Application(s) Topical two times a day  dextrose 5%. 1000 milliLiter(s) (50 mL/Hr) IV Continuous <Continuous>  dextrose 50% Injectable 12.5 Gram(s) IV Push once  dextrose 50% Injectable 25 Gram(s) IV Push once  dextrose 50% Injectable 25 Gram(s) IV Push once  escitalopram 10 milliGRAM(s) Oral daily  ferrous    sulfate 325 milliGRAM(s) Oral two times a day  heparin  Injectable 5000 Unit(s) SubCutaneous every 8 hours  insulin glargine Injectable (LANTUS) 10 Unit(s) SubCutaneous at bedtime  insulin lispro (HumaLOG) corrective regimen sliding scale   SubCutaneous three times a day before meals  insulin lispro (HumaLOG) corrective regimen sliding scale   SubCutaneous at bedtime  metFORMIN 1000 milliGRAM(s) Oral two times a day  metoprolol tartrate 12.5 milliGRAM(s) Oral <User Schedule>  multivitamin 1 Tablet(s) Oral daily  pantoprazole    Tablet 40 milliGRAM(s) Oral before breakfast  simethicone 80 milliGRAM(s) Chew daily    MEDICATIONS  (PRN):  acetaminophen   Tablet .. 650 milliGRAM(s) Oral every 6 hours PRN Temp greater or equal to 38C (100.4F), Mild Pain (1 - 3), Moderate Pain (4 - 6)  dextrose 40% Gel 15 Gram(s) Oral once PRN Blood Glucose LESS THAN 70 milliGRAM(s)/deciliter  docusate sodium 100 milliGRAM(s) Oral two times a day PRN Constipation  famotidine    Tablet 20 milliGRAM(s) Oral daily PRN reflux  glucagon  Injectable 1 milliGRAM(s) IntraMuscular once PRN Glucose LESS THAN 70 milligrams/deciliter  oxyCODONE    5 mG/acetaminophen 325 mG 1 Tablet(s) Oral every 6 hours PRN Moderate Pain (4 - 6)  oxyCODONE    5 mG/acetaminophen 325 mG 2 Tablet(s) Oral every 6 hours PRN Severe Pain (7 - 10)  polyethylene glycol 3350 17 Gram(s) Oral daily PRN Constipation  senna 2 Tablet(s) Oral at bedtime PRN Constipation    Pertinent Labs:  08-14 Na132 mmol/L<L> Glu 155 mg/dL<H> K+ 4.2 mmol/L Cr  0.53 mg/dL BUN 8 mg/dL 07-26 [id number] 6.9 %<H> 07-26 Chol 128 mg/dL LDL 60 mg/dL HDL 62 mg/dL Trig 81 mg/dL    Skin: No Pressure Ulcers  Surgical Incision(as Per Nursing Flow Sheet)     Edema: None Noted     Last Bowel Movement: on 8/13    Estimated Needs:   [X] No Change Since Previous Assessment    Previous Nutrition Diagnosis:   No Active Nutrition Dx @ This Present Time    Nutrition Diagnosis is [X] Not Applicable     New Nutrition Diagnosis: [X] Not Applicable    Interventions:   1. Recommend Change Glucerna 8oz PO to TID   2. Recommend Continue Nutrition Plan of Care     Monitoring & Evaluation:   [X] Weights   [X] PO Intake   [X] Follow Up (Per Protocol)  [X] Tolerance to Diet Prescription   [X] Other: Labs & POCT    Registered Dietitian/Nutritionist Remains Available.  Elías Sanderson RDN    Pager # [phone]  Phone# [phone]

## 2019-08-14 NOTE — PROGRESS NOTE ADULT - SUBJECTIVE AND OBJECTIVE BOX
59 year old Armenian and English speaking female with PMH of HTN, HLD, DM2 who presented to [facility] ED 7/25 with near syncope after arguing with her daughter. Patient also had acute onset nausea, diaphoresis, palpitations, dizziness, and mild chest pressure. NO chest pain, SOB, AGARWAL, PND, orthopnea, increased lower extremity edema, fever/chills, cough. The patient had cath at [facility] which showed 95% left main and 95% RCA lesions and she was transferred to [facility] for CABG on 7/27 with Dr. Ramey. Hospital course sig. for chest tube placement (DC'd 7/30), RLE ghassan (DC'd 7/30), small R thigh hematoma (8/1) and afebrile leukocytosis to 11 (8/1). Admitted to [facility] for gait instability, ADL impairments and functional impairments s/p CABG.    seen at the bedside, still c/o chest discomfort, 5/10, aggravated by movements, and getting out of bed, burning round the incision site, no dizziness, no sob, no n/v. no dysuria,         Vital Signs Last 24 Hrs  T(C): 36.9 (13 Aug 2019 20:42), Max: 36.9 (13 Aug 2019 20:42)  T(F): 98.5 (13 Aug 2019 20:42), Max: 98.5 (13 Aug 2019 20:42)  HR: 73 (14 Aug 2019 05:44) (67 - 76)  BP: 122/72 (14 Aug 2019 05:44) (110/64 - 122/72)  BP(mean): --  RR: 14 (13 Aug 2019 20:42) (14 - 14)  SpO2: 96% (13 Aug 2019 20:42) (96% - 96%)      GENERAL- NAD  EAR/NOSE/MOUTH/THROAT - no pharyngeal exudates,   MMM  EYES- NILA, conjunctiva and Sclera clear  NECK- supple  RESPIRATORY-  clear to auscultation bilaterally  CARDIOVASCULAR - SIS2, RRR, chest incision clean  GI - soft NT BS present  EXTREMITIES- no pedal edema  NEUROLOGY- no gross focal deficits  SKIN- no rashes, warm to touch chest incision clean, incision on the right LE clean  PSYCHIATRY- AAO X 3  MUSCULOSKELETAL- ROM normal                      x                    132  | 26   | 8            x     >-----------< x       ------------------------< 155                   x                    4.2  | 97   | 0.53                                         Ca 8.9   Mg x     Ph x        CAPILLARY BLOOD GLUCOSE      POCT Blood Glucose.: 166 mg/dL (14 Aug 2019 11:59)  POCT Blood Glucose.: 157 mg/dL (14 Aug 2019 07:27)  POCT Blood Glucose.: 153 mg/dL (13 Aug 2019 21:42)  POCT Blood Glucose.: 168 mg/dL (13 Aug 2019 16:43)    Labs reviewed:     CXR personally reviewed: Clear lung fields bilaterally    < from: Xray Chest 1 View- PORTABLE-Routine (08.09.19 @ 08:41) >    EXAM:  XR CHEST PORTABLE ROUTINE 1V      PROCEDURE DATE:  08/09/2019        INTERPRETATION:  Single view chest    History pleural effusion    Comparison 08/01/19    There is a small left pleural effusion, occupying roughly 25-30% left   hemithorax,larger since the prior exam with overlying airspace opacity.   There is no pneumothorax or maureen central edema. The right hemithorax is   clear. The heart is grossly normal in size for projection.      < end of copied text >

## 2019-08-14 NOTE — PROGRESS NOTE ADULT - SUBJECTIVE AND OBJECTIVE BOX
[facility] Consultation    CHIEF COMPLAINT: Patient is a 59y old  Female who presents with a chief complaint of gait instability, ADL impairments and functional impairments s/p CABG 19 (14 Aug 2019 08:23)  patient seen and examined   chart reviewed  patient sitting in wheelchair, appears comfortable but is complaining of feeling weak, leg and hip pain     HPI:  Pt is a 59 year old Urdu and English speaking female with PMH of HTN, HLD, DM2 who presented to [facility] ED  with near syncope after arguing with her daughter. Patient also had acute onset nausea, diaphoresis, palpitations, dizziness, and mild chest pressure. NO chest pain, SOB, AGARWAL, PND, orthopnea, increased lower extremity edema, fever/chills, cough. The patient had cath at [facility] which showed 95% left main and 95% RCA lesions and she was transferred to [facility] for CABG on  with Dr. Ramey. Hospital course sig. for chest tube placement (DC'd ), RLE ghassan (DC'd ), small R thigh hematoma () and afebrile leukocytosis to 11 (). Admitted to [facility] for gait instability, ADL impairments and functional impairments s/p CABG. (02 Aug 2019 12:13)      PAST MEDICAL & SURGICAL HISTORY:  Gall stone pancreatitis: S/P laparoscopic cholecystectomy  Low back pain  Diabetes mellitus, type II  Benign hypertension  History of cholecystectomy: laparoscopic cholecystectomy  H/O:       SOCIAL HISTORY: non smoker     FAMILY HISTORY  Family history of cancer  Family history of diabetes mellitus (DM)      MEDICATIONS  (STANDING):  ascorbic acid 500 milliGRAM(s) Oral two times a day  aspirin  chewable 81 milliGRAM(s) Oral daily  atorvastatin 40 milliGRAM(s) Oral at bedtime  BACItracin   Ointment 1 Application(s) Topical two times a day  clotrimazole 1% Cream 1 Application(s) Topical two times a day  dextrose 5%. 1000 milliLiter(s) (50 mL/Hr) IV Continuous <Continuous>  dextrose 50% Injectable 12.5 Gram(s) IV Push once  dextrose 50% Injectable 25 Gram(s) IV Push once  dextrose 50% Injectable 25 Gram(s) IV Push once  escitalopram 10 milliGRAM(s) Oral daily  ferrous    sulfate 325 milliGRAM(s) Oral two times a day  heparin  Injectable 5000 Unit(s) SubCutaneous every 8 hours  insulin glargine Injectable (LANTUS) 10 Unit(s) SubCutaneous at bedtime  insulin lispro (HumaLOG) corrective regimen sliding scale   SubCutaneous three times a day before meals  insulin lispro (HumaLOG) corrective regimen sliding scale   SubCutaneous at bedtime  metFORMIN 1000 milliGRAM(s) Oral two times a day  metoprolol tartrate 12.5 milliGRAM(s) Oral <User Schedule>  multivitamin 1 Tablet(s) Oral daily  pantoprazole    Tablet 40 milliGRAM(s) Oral before breakfast  simethicone 80 milliGRAM(s) Chew daily    MEDICATIONS  (PRN):  acetaminophen   Tablet .. 650 milliGRAM(s) Oral every 6 hours PRN Temp greater or equal to 38C (100.4F), Mild Pain (1 - 3), Moderate Pain (4 - 6)  dextrose 40% Gel 15 Gram(s) Oral once PRN Blood Glucose LESS THAN 70 milliGRAM(s)/deciliter  docusate sodium 100 milliGRAM(s) Oral two times a day PRN Constipation  famotidine    Tablet 20 milliGRAM(s) Oral daily PRN reflux  glucagon  Injectable 1 milliGRAM(s) IntraMuscular once PRN Glucose LESS THAN 70 milligrams/deciliter  oxyCODONE    5 mG/acetaminophen 325 mG 1 Tablet(s) Oral every 6 hours PRN Moderate Pain (4 - 6)  oxyCODONE    5 mG/acetaminophen 325 mG 2 Tablet(s) Oral every 6 hours PRN Severe Pain (7 - 10)  polyethylene glycol 3350 17 Gram(s) Oral daily PRN Constipation  senna 2 Tablet(s) Oral at bedtime PRN Constipation      Allergies    metronidazole (Rash)    Intolerances    Norvasc (Swelling)      REVIEW OF SYSTEMS:    CONSTITUTIONAL: weak   EYES: No visual changes, No diplopia  ENMT: No throat pain , No exudate  NECK: No pain or stiffness  RESPIRATORY: No cough, wheezing, hemoptysis; No shortness of breath  CARDIOVASCULAR:  No shortness of breath or dyspnea on exertion.  No palpitations, dizziness, light headedness, syncope or near syncope.  No edema, no orthopnea.   GASTROINTESTINAL: No abdominal pain. No nausea, vomiting, or hematemesis; No diarrhea or constipation. No melena or hematochezia.  GENITOURINARY: No dysuria, frequency or hematuria  SKIN: No itching or rash  All other review of systems is negative unless indicated above    VITAL SIGNS:   Vital Signs Last 24 Hrs  T(C): 36.9 (13 Aug 2019 20:42), Max: 36.9 (13 Aug 2019 20:42)  T(F): 98.5 (13 Aug 2019 20:42), Max: 98.5 (13 Aug 2019 20:42)  HR: 73 (14 Aug 2019 05:44) (67 - 76)  BP: 122/72 (14 Aug 2019 05:44) (110/64 - 122/72)  BP(mean): --  RR: 14 (13 Aug 2019 20:42) (14 - 14)  SpO2: 96% (13 Aug 2019 20:42) (96% - 96%)    I&O's Summary    13 Aug 2019 07:01  -  14 Aug 2019 07:00  --------------------------------------------------------  IN: 500 mL / OUT: 4 mL / NET: 496 mL        PHYSICAL EXAM:    Constitutional: NAD, awake and alert, well-developed  Eyes:  EOMI,  Pupils round, no lesions  ENMT: no exudate or erythema  Pulmonary: decreased breath sounds bilateral   Cardiovascular: PMI not palpable non-displaced Regular S1 and S2, no murmurs, rubs, gallops or clicks  Gastrointestinal: Bowel Sounds present, soft, nontender.   Lymph: No peripheral edema. No cervical lymphadenopathy.  Neurological: Alert, no focal deficits  Skin: No rashes. Changes of chronic venous stasis. No cyanosis.  Psych:  Mood & affect appropriate    LABS: All Labs Reviewed:                        9.6    7.36  )-----------( 438      ( 12 Aug 2019 06:05 )             29.7     14 Aug 2019 05:40    132    |  97     |  8      ----------------------------<  155    4.2     |  26     |  0.53   13 Aug 2019 05:40    130    |  96     |  9      ----------------------------<  139    4.5     |  26     |  0.60   12 Aug 2019 06:05    131    |  94     |  12     ----------------------------<  127    4.2     |  26     |  0.60     Ca    8.9        14 Aug 2019 05:40  Ca    9.1        13 Aug 2019 05:40  Ca    9.0        12 Aug 2019 06:05  Mg     1.7       12 Aug 2019 06:05    EKG:  < from: 12 Lead ECG (19 @ 21:59) >   Normal sinus rhythm  T wave abnormality, consider lateral ischemia  Abnormal ECG  When compared with ECG of 07-AUG-2019 14:40,  T wave inversion no longer evident in Anterior leads    < end of copied text >

## 2019-08-15 LAB
ANION GAP SERPL CALC-SCNC: 5 MMOL/L — SIGNIFICANT CHANGE UP (ref 5–17)
BASOPHILS # BLD AUTO: 0.04 K/UL — SIGNIFICANT CHANGE UP (ref 0–0.2)
BASOPHILS NFR BLD AUTO: 0.8 % — SIGNIFICANT CHANGE UP (ref 0–2)
BUN SERPL-MCNC: 7 MG/DL — SIGNIFICANT CHANGE UP (ref 7–23)
CALCIUM SERPL-MCNC: 8.4 MG/DL — SIGNIFICANT CHANGE UP (ref 8.4–10.5)
CHLORIDE SERPL-SCNC: 97 MMOL/L — SIGNIFICANT CHANGE UP (ref 96–108)
CO2 SERPL-SCNC: 27 MMOL/L — SIGNIFICANT CHANGE UP (ref 22–31)
CREAT SERPL-MCNC: 0.52 MG/DL — SIGNIFICANT CHANGE UP (ref 0.5–1.3)
EOSINOPHIL # BLD AUTO: 0.22 K/UL — SIGNIFICANT CHANGE UP (ref 0–0.5)
EOSINOPHIL NFR BLD AUTO: 4.5 % — SIGNIFICANT CHANGE UP (ref 0–6)
GLUCOSE SERPL-MCNC: 147 MG/DL — HIGH (ref 70–99)
HCT VFR BLD CALC: 30.4 % — LOW (ref 34.5–45)
HGB BLD-MCNC: 9.7 G/DL — LOW (ref 11.5–15.5)
IMM GRANULOCYTES NFR BLD AUTO: 1.2 % — SIGNIFICANT CHANGE UP (ref 0–1.5)
LYMPHOCYTES # BLD AUTO: 1.05 K/UL — SIGNIFICANT CHANGE UP (ref 1–3.3)
LYMPHOCYTES # BLD AUTO: 21.4 % — SIGNIFICANT CHANGE UP (ref 13–44)
MAGNESIUM SERPL-MCNC: 1.7 MG/DL — SIGNIFICANT CHANGE UP (ref 1.6–2.6)
MCHC RBC-ENTMCNC: 30.1 PG — SIGNIFICANT CHANGE UP (ref 27–34)
MCHC RBC-ENTMCNC: 31.9 GM/DL — LOW (ref 32–36)
MCV RBC AUTO: 94.4 FL — SIGNIFICANT CHANGE UP (ref 80–100)
MONOCYTES # BLD AUTO: 0.66 K/UL — SIGNIFICANT CHANGE UP (ref 0–0.9)
MONOCYTES NFR BLD AUTO: 13.5 % — SIGNIFICANT CHANGE UP (ref 2–14)
NEUTROPHILS # BLD AUTO: 2.87 K/UL — SIGNIFICANT CHANGE UP (ref 1.8–7.4)
NEUTROPHILS NFR BLD AUTO: 58.6 % — SIGNIFICANT CHANGE UP (ref 43–77)
NRBC # BLD: 0 /100 WBCS — SIGNIFICANT CHANGE UP (ref 0–0)
PLATELET # BLD AUTO: 448 K/UL — HIGH (ref 150–400)
POTASSIUM SERPL-MCNC: 4.4 MMOL/L — SIGNIFICANT CHANGE UP (ref 3.5–5.3)
POTASSIUM SERPL-SCNC: 4.4 MMOL/L — SIGNIFICANT CHANGE UP (ref 3.5–5.3)
RBC # BLD: 3.22 M/UL — LOW (ref 3.8–5.2)
RBC # FLD: 15.8 % — HIGH (ref 10.3–14.5)
SODIUM SERPL-SCNC: 129 MMOL/L — LOW (ref 135–145)
WBC # BLD: 4.9 K/UL — SIGNIFICANT CHANGE UP (ref 3.8–10.5)
WBC # FLD AUTO: 4.9 K/UL — SIGNIFICANT CHANGE UP (ref 3.8–10.5)

## 2019-08-15 PROCEDURE — 71045 X-RAY EXAM CHEST 1 VIEW: CPT | Mod: 26

## 2019-08-15 PROCEDURE — 99232 SBSQ HOSP IP/OBS MODERATE 35: CPT

## 2019-08-15 RX ORDER — METOPROLOL TARTRATE 50 MG
12.5 TABLET ORAL
Refills: 0 | Status: DISCONTINUED | OUTPATIENT
Start: 2019-08-15 | End: 2019-08-17

## 2019-08-15 RX ORDER — FUROSEMIDE 40 MG
40 TABLET ORAL DAILY
Refills: 0 | Status: DISCONTINUED | OUTPATIENT
Start: 2019-08-16 | End: 2019-08-17

## 2019-08-15 RX ADMIN — HEPARIN SODIUM 5000 UNIT(S): 5000 INJECTION INTRAVENOUS; SUBCUTANEOUS at 05:56

## 2019-08-15 RX ADMIN — Medication 650 MILLIGRAM(S): at 14:05

## 2019-08-15 RX ADMIN — Medication 1 APPLICATION(S): at 05:56

## 2019-08-15 RX ADMIN — SIMETHICONE 80 MILLIGRAM(S): 80 TABLET, CHEWABLE ORAL at 12:06

## 2019-08-15 RX ADMIN — HEPARIN SODIUM 5000 UNIT(S): 5000 INJECTION INTRAVENOUS; SUBCUTANEOUS at 14:14

## 2019-08-15 RX ADMIN — Medication 1 APPLICATION(S): at 05:57

## 2019-08-15 RX ADMIN — Medication 650 MILLIGRAM(S): at 17:21

## 2019-08-15 RX ADMIN — Medication 650 MILLIGRAM(S): at 06:02

## 2019-08-15 RX ADMIN — Medication 325 MILLIGRAM(S): at 17:28

## 2019-08-15 RX ADMIN — ESCITALOPRAM OXALATE 10 MILLIGRAM(S): 10 TABLET, FILM COATED ORAL at 12:06

## 2019-08-15 RX ADMIN — Medication 650 MILLIGRAM(S): at 12:09

## 2019-08-15 RX ADMIN — ATORVASTATIN CALCIUM 40 MILLIGRAM(S): 80 TABLET, FILM COATED ORAL at 21:58

## 2019-08-15 RX ADMIN — INSULIN GLARGINE 10 UNIT(S): 100 INJECTION, SOLUTION SUBCUTANEOUS at 21:58

## 2019-08-15 RX ADMIN — HEPARIN SODIUM 5000 UNIT(S): 5000 INJECTION INTRAVENOUS; SUBCUTANEOUS at 21:58

## 2019-08-15 RX ADMIN — Medication 325 MILLIGRAM(S): at 05:56

## 2019-08-15 RX ADMIN — Medication 500 MILLIGRAM(S): at 05:55

## 2019-08-15 RX ADMIN — Medication 500 MILLIGRAM(S): at 17:22

## 2019-08-15 RX ADMIN — Medication 12.5 MILLIGRAM(S): at 20:29

## 2019-08-15 RX ADMIN — Medication 650 MILLIGRAM(S): at 17:30

## 2019-08-15 RX ADMIN — Medication 1 TABLET(S): at 12:06

## 2019-08-15 RX ADMIN — Medication 81 MILLIGRAM(S): at 12:06

## 2019-08-15 RX ADMIN — Medication 1 APPLICATION(S): at 17:30

## 2019-08-15 RX ADMIN — METFORMIN HYDROCHLORIDE 1000 MILLIGRAM(S): 850 TABLET ORAL at 07:31

## 2019-08-15 RX ADMIN — Medication 1: at 17:22

## 2019-08-15 RX ADMIN — Medication 1 APPLICATION(S): at 17:23

## 2019-08-15 RX ADMIN — Medication 1: at 07:30

## 2019-08-15 RX ADMIN — METFORMIN HYDROCHLORIDE 1000 MILLIGRAM(S): 850 TABLET ORAL at 17:22

## 2019-08-15 RX ADMIN — Medication 650 MILLIGRAM(S): at 07:00

## 2019-08-15 RX ADMIN — PANTOPRAZOLE SODIUM 40 MILLIGRAM(S): 20 TABLET, DELAYED RELEASE ORAL at 05:56

## 2019-08-15 RX ADMIN — Medication 40 MILLIGRAM(S): at 05:58

## 2019-08-15 NOTE — PROGRESS NOTE ADULT - GENERAL COMMENTS
Patient seen in OT with language line  in [facility]. Noted to have increased trnasfers, endurance, currently with sueprvision. Increased ambulation distance. Patietn also expresses happiness at coming to rehab, and notes feeling better

## 2019-08-15 NOTE — PROGRESS NOTE ADULT - SUBJECTIVE AND OBJECTIVE BOX
Patient is a 59y old  Female who presents with a chief complaint of gait instability, ADL impairments and functional impairments s/p CABG 19 (14 Aug 2019 12:44)      HPI:  Pt is a 59 year old Czech and English speaking female with PMH of HTN, HLD, DM2 who presented to [facility] ED  with near syncope after arguing with her daughter. Patient also had acute onset nausea, diaphoresis, palpitations, dizziness, and mild chest pressure. NO chest pain, SOB, AGARWAL, PND, orthopnea, increased lower extremity edema, fever/chills, cough. The patient had cath at [facility] which showed 95% left main and 95% RCA lesions and she was transferred to [facility] for CABG on  with Dr. Ramey. Hospital course sig. for chest tube placement (DC'd ), RLE ghassan (DC'd ), small R thigh hematoma () and afebrile leukocytosis to 11 (). Admitted to [facility] for gait instability, ADL impairments and functional impairments s/p CABG. (02 Aug 2019 12:13)      PAST MEDICAL & SURGICAL HISTORY:  Gall stone pancreatitis: S/P laparoscopic cholecystectomy  Low back pain  Diabetes mellitus, type II  Benign hypertension  History of cholecystectomy: laparoscopic cholecystectomy  H/O:       MEDICATIONS  (STANDING):  ascorbic acid 500 milliGRAM(s) Oral two times a day  aspirin  chewable 81 milliGRAM(s) Oral daily  atorvastatin 40 milliGRAM(s) Oral at bedtime  BACItracin   Ointment 1 Application(s) Topical two times a day  clotrimazole 1% Cream 1 Application(s) Topical two times a day  dextrose 5%. 1000 milliLiter(s) (50 mL/Hr) IV Continuous <Continuous>  dextrose 50% Injectable 12.5 Gram(s) IV Push once  dextrose 50% Injectable 25 Gram(s) IV Push once  dextrose 50% Injectable 25 Gram(s) IV Push once  escitalopram 10 milliGRAM(s) Oral daily  ferrous    sulfate 325 milliGRAM(s) Oral two times a day  furosemide    Tablet 40 milliGRAM(s) Oral daily  heparin  Injectable 5000 Unit(s) SubCutaneous every 8 hours  insulin glargine Injectable (LANTUS) 10 Unit(s) SubCutaneous at bedtime  insulin lispro (HumaLOG) corrective regimen sliding scale   SubCutaneous three times a day before meals  insulin lispro (HumaLOG) corrective regimen sliding scale   SubCutaneous at bedtime  metFORMIN 1000 milliGRAM(s) Oral two times a day  multivitamin 1 Tablet(s) Oral daily  pantoprazole    Tablet 40 milliGRAM(s) Oral before breakfast  simethicone 80 milliGRAM(s) Chew daily    MEDICATIONS  (PRN):  acetaminophen   Tablet .. 650 milliGRAM(s) Oral every 6 hours PRN Temp greater or equal to 38C (100.4F), Mild Pain (1 - 3), Moderate Pain (4 - 6)  dextrose 40% Gel 15 Gram(s) Oral once PRN Blood Glucose LESS THAN 70 milliGRAM(s)/deciliter  docusate sodium 100 milliGRAM(s) Oral two times a day PRN Constipation  famotidine    Tablet 20 milliGRAM(s) Oral daily PRN reflux  glucagon  Injectable 1 milliGRAM(s) IntraMuscular once PRN Glucose LESS THAN 70 milligrams/deciliter  oxyCODONE    5 mG/acetaminophen 325 mG 2 Tablet(s) Oral every 6 hours PRN Severe Pain (7 - 10)  oxyCODONE    5 mG/acetaminophen 325 mG 1 Tablet(s) Oral every 6 hours PRN Moderate Pain (4 - 6)  polyethylene glycol 3350 17 Gram(s) Oral daily PRN Constipation  senna 2 Tablet(s) Oral at bedtime PRN Constipation      Allergies    metronidazole (Rash)    Intolerances    Norvasc (Swelling)        VITALS  59y  Vital Signs Last 24 Hrs  T(C): 36.6 (15 Aug 2019 07:37), Max: 36.9 (14 Aug 2019 20:15)  T(F): 97.9 (15 Aug 2019 07:37), Max: 98.4 (14 Aug 2019 20:15)  HR: 78 (15 Aug 2019 07:37) (71 - 78)  BP: 94/63 (15 Aug 2019 07:37) (94/63 - 109/65)  BP(mean): --  RR: 14 (15 Aug 2019 07:37) (14 - 14)  SpO2: 98% (15 Aug 2019 07:37) (98% - 99%)  Daily     Daily         RECENT LABS:                          9.7    4.90  )-----------( 448      ( 15 Aug 2019 05:25 )             30.4     08-15    129<L>  |  97  |  7   ----------------------------<  147<H>  4.4   |  27  |  0.52    Ca    8.4      15 Aug 2019 05:25  Mg     1.7     08-15                CAPILLARY BLOOD GLUCOSE      POCT Blood Glucose.: 165 mg/dL (15 Aug 2019 07:21)  POCT Blood Glucose.: 213 mg/dL (14 Aug 2019 21:14)  POCT Blood Glucose.: 150 mg/dL (14 Aug 2019 16:47)  POCT Blood Glucose.: 166 mg/dL (14 Aug 2019 11:59)        EXAM:  XR CHEST PORTABLE ROUTINE 1V      PROCEDURE DATE:  2019        INTERPRETATION:  Single view chest    History pleural effusion    Comparison 19    There is a small left pleural effusion, occupying roughly 25-30% left   hemithorax, larger since the prior exam with overlying airspace opacity.   There is no pneumothorax or maureen central edema. The right hemithorax is   clear. The heart is grossly normal in size for projection.                  PAUL DAVIS M.D., ATTENDING RADIOLOGIST  This document has been electronically signed. Aug  9 2019  9:24AM

## 2019-08-15 NOTE — PROGRESS NOTE ADULT - ASSESSMENT
ASSESSMENT/PLAN  Pt is a 59 year old Ukrainian and English speaking female with PMH of HTN, HLD, DM2 who presented 07/30/19 with near syncope, found to have 95% left main and 95% RCA lesions now s/p CABG 07/27/19 with subsequent gait instability, ADL impairments and functional impairments.    #S/p CABG 7/27/19  - Continue with comprehensive rehab program OT, PT  - post-surgical support bra PRN with improvement in pain, sternal discomfort  - Strict intake/output, daily weights (daily weights are stable)	  - Outpt f/u with Dr. Pallazo on dc    #CAD:  -ASA, Atorvastatin 40mg qhs, metoprolol 12.5mg qHS  -Cardiology oupt f/u with Dr. Juárez     #LEFT PLEURAL EFFUSION: reduced breath sounds left base on exam, O2 sat stable, no distress  -last CXR 8/9 with 25-30% hemithorax taken up by effusion on left, repeat pending. patient clinically improving  -continue incentive spirometer, discussed with patient and son    #HTN  - decrease Metoprolol 12.5mg qhs as patient noted to have episodes of hypotension and bradycardia throughout the day. - - - off furosemide (discontinued earlier in stay)  -cardiology consult appreciated. Recommended dc metoprolol if BP continues to be low. Follow your heart team recommends resuming 12.5 qhs ramonita if hyperdynamic. will restart and monitor 8/15  - daily weights.     #Diabetes mellitus, type II  -Humalog 2u pre-meal, SSI  -Metformin 1000mg PO bid    #hyponatremia  -dc'd salt restriction in diet on 8/13.   -patient received lasix, likely cause of drop in Na today 129, discussed with hospitalist  -dc lasix 8/15 as discussed with follow your heart team. Follow CXR re: possible restart  -consider fluid restriction      #Anemia, improving  -Monitor CBC-->9.6 (8/12)  -CBC ordered for 8/15--> 9.7 stable    #Pain control  -Tylenol PRN,  -dc percocet 8/15    #Hyperlipidemia  -Atorvastatin 40mg qhs    #GERD  - Protonix daily   -simethicone daily   -sit up at least 30 minutes post meals    #derm  clotrimazole bid ordered for fungal rash    #Mood  -Lexapro  -neuropsychology evaluation mood, supportive counseling 8/6    #Nutrition  -Regular diet Consistent carb/no snack  -NO salt restriction    -#DVT PPx:   Heparin, SCDs, TEDs    #Case discussed in IDT rounds 8/15:  Patient on track for dc home with home care referral OT and PT 8/17/19 if medically stable. Currently supervision transfers, ambulation with RW, goal for mod independent, supervision/CG shower and stairs    Pending:   BMp 8/16  CXR

## 2019-08-16 ENCOUNTER — TRANSCRIPTION ENCOUNTER (OUTPATIENT)
Age: 59
End: 2019-08-16

## 2019-08-16 DIAGNOSIS — K21.9 GASTRO-ESOPHAGEAL REFLUX DISEASE WITHOUT ESOPHAGITIS: ICD-10-CM

## 2019-08-16 DIAGNOSIS — D64.9 ANEMIA, UNSPECIFIED: ICD-10-CM

## 2019-08-16 DIAGNOSIS — E11.9 TYPE 2 DIABETES MELLITUS WITHOUT COMPLICATIONS: ICD-10-CM

## 2019-08-16 DIAGNOSIS — F39 UNSPECIFIED MOOD [AFFECTIVE] DISORDER: ICD-10-CM

## 2019-08-16 DIAGNOSIS — E87.1 HYPO-OSMOLALITY AND HYPONATREMIA: ICD-10-CM

## 2019-08-16 DIAGNOSIS — E78.5 HYPERLIPIDEMIA, UNSPECIFIED: ICD-10-CM

## 2019-08-16 DIAGNOSIS — I10 ESSENTIAL (PRIMARY) HYPERTENSION: ICD-10-CM

## 2019-08-16 DIAGNOSIS — J90 PLEURAL EFFUSION, NOT ELSEWHERE CLASSIFIED: ICD-10-CM

## 2019-08-16 DIAGNOSIS — I25.10 ATHEROSCLEROTIC HEART DISEASE OF NATIVE CORONARY ARTERY WITHOUT ANGINA PECTORIS: ICD-10-CM

## 2019-08-16 LAB
ANION GAP SERPL CALC-SCNC: 9 MMOL/L — SIGNIFICANT CHANGE UP (ref 5–17)
BUN SERPL-MCNC: 8 MG/DL — SIGNIFICANT CHANGE UP (ref 7–23)
CALCIUM SERPL-MCNC: 8.6 MG/DL — SIGNIFICANT CHANGE UP (ref 8.4–10.5)
CHLORIDE SERPL-SCNC: 98 MMOL/L — SIGNIFICANT CHANGE UP (ref 96–108)
CO2 SERPL-SCNC: 24 MMOL/L — SIGNIFICANT CHANGE UP (ref 22–31)
CREAT SERPL-MCNC: 0.54 MG/DL — SIGNIFICANT CHANGE UP (ref 0.5–1.3)
FOLATE SERPL-MCNC: >20 NG/ML — SIGNIFICANT CHANGE UP
GLUCOSE SERPL-MCNC: 107 MG/DL — HIGH (ref 70–99)
POTASSIUM SERPL-MCNC: 4.4 MMOL/L — SIGNIFICANT CHANGE UP (ref 3.5–5.3)
POTASSIUM SERPL-SCNC: 4.4 MMOL/L — SIGNIFICANT CHANGE UP (ref 3.5–5.3)
SODIUM SERPL-SCNC: 131 MMOL/L — LOW (ref 135–145)
VIT B12 SERPL-MCNC: 309 PG/ML — SIGNIFICANT CHANGE UP (ref 232–1245)

## 2019-08-16 PROCEDURE — 99232 SBSQ HOSP IP/OBS MODERATE 35: CPT

## 2019-08-16 PROCEDURE — 99233 SBSQ HOSP IP/OBS HIGH 50: CPT

## 2019-08-16 RX ORDER — ESCITALOPRAM OXALATE 10 MG/1
1 TABLET, FILM COATED ORAL
Qty: 30 | Refills: 0
Start: 2019-08-16

## 2019-08-16 RX ORDER — ESCITALOPRAM OXALATE 10 MG/1
1 TABLET, FILM COATED ORAL
Qty: 0 | Refills: 0 | DISCHARGE

## 2019-08-16 RX ORDER — ATORVASTATIN CALCIUM 80 MG/1
1 TABLET, FILM COATED ORAL
Qty: 30 | Refills: 0
Start: 2019-08-16

## 2019-08-16 RX ORDER — SIMETHICONE 80 MG/1
1 TABLET, CHEWABLE ORAL
Qty: 0 | Refills: 0 | DISCHARGE
Start: 2019-08-16

## 2019-08-16 RX ORDER — ASPIRIN/CALCIUM CARB/MAGNESIUM 324 MG
1 TABLET ORAL
Qty: 0 | Refills: 0 | DISCHARGE
Start: 2019-08-16

## 2019-08-16 RX ORDER — METFORMIN HYDROCHLORIDE 850 MG/1
1 TABLET ORAL
Qty: 60 | Refills: 0
Start: 2019-08-16

## 2019-08-16 RX ORDER — METOPROLOL TARTRATE 50 MG
0.5 TABLET ORAL
Qty: 15 | Refills: 0
Start: 2019-08-16

## 2019-08-16 RX ORDER — ASCORBIC ACID 60 MG
1 TABLET,CHEWABLE ORAL
Qty: 0 | Refills: 0 | DISCHARGE
Start: 2019-08-16

## 2019-08-16 RX ORDER — ASPIRIN/CALCIUM CARB/MAGNESIUM 324 MG
1 TABLET ORAL
Qty: 0 | Refills: 0 | DISCHARGE

## 2019-08-16 RX ORDER — GLIMEPIRIDE 1 MG
1 TABLET ORAL
Qty: 60 | Refills: 0
Start: 2019-08-16

## 2019-08-16 RX ORDER — ACETAMINOPHEN 500 MG
2 TABLET ORAL
Qty: 0 | Refills: 0 | DISCHARGE
Start: 2019-08-16

## 2019-08-16 RX ORDER — METFORMIN HYDROCHLORIDE 850 MG/1
1 TABLET ORAL
Qty: 0 | Refills: 0 | DISCHARGE

## 2019-08-16 RX ORDER — INSULIN LISPRO 100/ML
2 VIAL (ML) SUBCUTANEOUS
Qty: 0 | Refills: 0 | DISCHARGE

## 2019-08-16 RX ORDER — SITAGLIPTIN 50 MG/1
1 TABLET, FILM COATED ORAL
Qty: 30 | Refills: 0
Start: 2019-08-16

## 2019-08-16 RX ADMIN — ESCITALOPRAM OXALATE 10 MILLIGRAM(S): 10 TABLET, FILM COATED ORAL at 11:57

## 2019-08-16 RX ADMIN — HEPARIN SODIUM 5000 UNIT(S): 5000 INJECTION INTRAVENOUS; SUBCUTANEOUS at 22:35

## 2019-08-16 RX ADMIN — SIMETHICONE 80 MILLIGRAM(S): 80 TABLET, CHEWABLE ORAL at 11:57

## 2019-08-16 RX ADMIN — ATORVASTATIN CALCIUM 40 MILLIGRAM(S): 80 TABLET, FILM COATED ORAL at 22:35

## 2019-08-16 RX ADMIN — METFORMIN HYDROCHLORIDE 1000 MILLIGRAM(S): 850 TABLET ORAL at 17:01

## 2019-08-16 RX ADMIN — HEPARIN SODIUM 5000 UNIT(S): 5000 INJECTION INTRAVENOUS; SUBCUTANEOUS at 13:15

## 2019-08-16 RX ADMIN — Medication 1 APPLICATION(S): at 17:02

## 2019-08-16 RX ADMIN — Medication 500 MILLIGRAM(S): at 17:01

## 2019-08-16 RX ADMIN — Medication 1 APPLICATION(S): at 06:39

## 2019-08-16 RX ADMIN — Medication 12.5 MILLIGRAM(S): at 17:01

## 2019-08-16 RX ADMIN — PANTOPRAZOLE SODIUM 40 MILLIGRAM(S): 20 TABLET, DELAYED RELEASE ORAL at 06:40

## 2019-08-16 RX ADMIN — Medication 650 MILLIGRAM(S): at 10:37

## 2019-08-16 RX ADMIN — Medication 40 MILLIGRAM(S): at 06:40

## 2019-08-16 RX ADMIN — HEPARIN SODIUM 5000 UNIT(S): 5000 INJECTION INTRAVENOUS; SUBCUTANEOUS at 06:40

## 2019-08-16 RX ADMIN — Medication 650 MILLIGRAM(S): at 08:19

## 2019-08-16 RX ADMIN — Medication 325 MILLIGRAM(S): at 06:40

## 2019-08-16 RX ADMIN — Medication 650 MILLIGRAM(S): at 17:01

## 2019-08-16 RX ADMIN — METFORMIN HYDROCHLORIDE 1000 MILLIGRAM(S): 850 TABLET ORAL at 08:19

## 2019-08-16 RX ADMIN — Medication 500 MILLIGRAM(S): at 06:38

## 2019-08-16 RX ADMIN — Medication 81 MILLIGRAM(S): at 11:57

## 2019-08-16 RX ADMIN — Medication 1 TABLET(S): at 11:57

## 2019-08-16 RX ADMIN — Medication 325 MILLIGRAM(S): at 17:01

## 2019-08-16 RX ADMIN — Medication 1 APPLICATION(S): at 17:01

## 2019-08-16 RX ADMIN — INSULIN GLARGINE 10 UNIT(S): 100 INJECTION, SOLUTION SUBCUTANEOUS at 22:36

## 2019-08-16 NOTE — PROGRESS NOTE ADULT - GENERAL COMMENTS
Patient seen with language line  in [facility]. Noted to have increased transfers, endurance, currently with sueprvision. Increased ambulation distance. Patient also expresses happiness at coming to rehab, and notes feeling better Patient seen with language line  in [facility] #[id number]. Noted to have increased transfers, endurance, currently with sueprvision. Increased ambulation distance. Patient also expresses happiness at coming to rehab, and notes feeling better

## 2019-08-16 NOTE — PROGRESS NOTE ADULT - CARDIOVASCULAR COMMENTS
Sternal incision site healing well. Dried surgical glue. No erythema, swelling or discharge. Substernal drain site with suture removed. skin approximated, healing well. No erythema, swelling or discharge. Nontender to palpation. Steristrips in place.

## 2019-08-16 NOTE — PROGRESS NOTE ADULT - SUBJECTIVE AND OBJECTIVE BOX
Patient is a 59y old  Female who presents with a chief complaint of gait instability, ADL impairments and functional impairments s/p CABG 07/27/19 (15 Aug 2019 10:51)  No acute events          Patient seen and examined at bedside.    ALLERGIES:  metronidazole (Rash)  Norvasc (Swelling)        Vital Signs Last 24 Hrs  T(F): 98 (16 Aug 2019 09:06), Max: 98.1 (16 Aug 2019 06:30)  HR: 65 (16 Aug 2019 09:06) (65 - 73)  BP: 111/71 (16 Aug 2019 09:06) (104/66 - 111/71)  RR: 14 (16 Aug 2019 09:06) (14 - 14)  SpO2: 97% (16 Aug 2019 09:06) (97% - 98%)  I&O's Summary    MEDICATIONS:  acetaminophen   Tablet .. 650 milliGRAM(s) Oral every 6 hours PRN  ascorbic acid 500 milliGRAM(s) Oral two times a day  aspirin  chewable 81 milliGRAM(s) Oral daily  atorvastatin 40 milliGRAM(s) Oral at bedtime  BACItracin   Ointment 1 Application(s) Topical two times a day  clotrimazole 1% Cream 1 Application(s) Topical two times a day  dextrose 40% Gel 15 Gram(s) Oral once PRN  dextrose 5%. 1000 milliLiter(s) IV Continuous <Continuous>  dextrose 50% Injectable 12.5 Gram(s) IV Push once  dextrose 50% Injectable 25 Gram(s) IV Push once  dextrose 50% Injectable 25 Gram(s) IV Push once  docusate sodium 100 milliGRAM(s) Oral two times a day PRN  escitalopram 10 milliGRAM(s) Oral daily  famotidine    Tablet 20 milliGRAM(s) Oral daily PRN  ferrous    sulfate 325 milliGRAM(s) Oral two times a day  furosemide    Tablet 40 milliGRAM(s) Oral daily  glucagon  Injectable 1 milliGRAM(s) IntraMuscular once PRN  heparin  Injectable 5000 Unit(s) SubCutaneous every 8 hours  insulin glargine Injectable (LANTUS) 10 Unit(s) SubCutaneous at bedtime  insulin lispro (HumaLOG) corrective regimen sliding scale   SubCutaneous three times a day before meals  insulin lispro (HumaLOG) corrective regimen sliding scale   SubCutaneous at bedtime  metFORMIN 1000 milliGRAM(s) Oral two times a day  metoprolol tartrate 12.5 milliGRAM(s) Oral <User Schedule>  multivitamin 1 Tablet(s) Oral daily  pantoprazole    Tablet 40 milliGRAM(s) Oral before breakfast  polyethylene glycol 3350 17 Gram(s) Oral daily PRN  senna 2 Tablet(s) Oral at bedtime PRN  simethicone 80 milliGRAM(s) Chew daily      PHYSICAL EXAM:  General: NAD, A/O x 3  ENT: MMM  Neck: Supple, No JVD  Lungs: Clear to auscultation bilaterally, good air entry  Cardio: S1S2 regular  Abdomen: Soft, Nontender, Nondistended  Extremities: No cyanosis, No edema    LABS:                        9.7    4.90  )-----------( 448      ( 15 Aug 2019 05:25 )             30.4     08-16    131  |  98  |  8   ----------------------------<  107  4.4   |  24  |  0.54    Ca    8.6      16 Aug 2019 06:00  Mg     1.7     08-15      eGFR if Non African American: 103 mL/min/1.73M2 (08-16-19 @ 06:00)  eGFR if : 119 mL/min/1.73M2 (08-16-19 @ 06:00)            07-26 Chol 128 mg/dL LDL 60 mg/dL HDL 62 mg/dL Trig 81 mg/dL              POCT Blood Glucose.: 129 mg/dL (16 Aug 2019 07:28)  POCT Blood Glucose.: 108 mg/dL (15 Aug 2019 21:16)  POCT Blood Glucose.: 187 mg/dL (15 Aug 2019 17:09)  POCT Blood Glucose.: 124 mg/dL (15 Aug 2019 12:02)    07-26 [id number] 6.9          RADIOLOGY & ADDITIONAL TESTS:    Care Discussed with Consultants/Other Providers:

## 2019-08-16 NOTE — DISCHARGE NOTE NURSING/CASE MANAGEMENT/SOCIAL WORK - NSDCDPATPORTLINK_GEN_ALL_CORE
You can access the [facility], offered by [facility], by registering with the following website: [email or web address]

## 2019-08-16 NOTE — PROGRESS NOTE ADULT - ASSESSMENT
ASSESSMENT/PLAN  Pt is a 59 year old Romanian and English speaking female with PMH of HTN, HLD, DM2 who presented 07/30/19 with near syncope, found to have 95% left main and 95% RCA lesions now s/p CABG 07/27/19 with subsequent gait instability, ADL impairments and functional impairments.    #S/p CABG 7/27/19  - Continue with comprehensive rehab program OT, PT  - post-surgical support bra PRN with improvement in pain, sternal discomfort  - Strict intake/output, daily weights (daily weights are stable)	  - Outpt f/u with Dr. Pallazo on dc    #CAD:  -ASA, Atorvastatin 40mg qhs, metoprolol 12.5mg qHS  -Cardiology oupt f/u with Dr. Juárez     #LEFT PLEURAL EFFUSION: reduced breath sounds left base on exam, O2 sat stable, no distress  -last CXR 8/9 with 25-30% hemithorax taken up by effusion on left, repeat pending. patient clinically improving  -continue incentive spirometer, discussed with patient and son  - 8/15 CXR: There is a moderate early large left effusion. Chest is similar to August 9    #HTN  - decrease Metoprolol 12.5mg qhs as patient noted to have episodes of hypotension and bradycardia throughout the day. - - - off furosemide (discontinued earlier in stay)  - cardiology consult appreciated. Recommended dc metoprolol if BP continues to be low. Follow your heart team recommends resuming 12.5 qhs ramonita if hyperdynamic. will restart and monitor 8/15  - daily weights.     #Diabetes mellitus, type II  -Humalog 2u pre-meal, SSI  -Metformin 1000mg PO bid    #hyponatremia  -dc'd salt restriction in diet on 8/13.   -patient received lasix, likely cause of drop in Na 8/15 129, discussed with hospitalist  -dc lasix 8/15 as discussed with follow your heart team. Follow CXR re: possible restart  -consider fluid restriction  - 8/16 Na 131      #Anemia, improving  -Monitor CBC-->9.6 (8/12)  -CBC ordered for 8/15--> 9.7 stable    #Pain control  -Tylenol PRN,  -dc percocet 8/15    #Hyperlipidemia  -Atorvastatin 40mg qhs    #GERD  - Protonix daily   -simethicone daily   -sit up at least 30 minutes post meals    #derm  clotrimazole bid ordered for fungal rash    #Mood  -Lexapro  -neuropsychology evaluation mood, supportive counseling 8/6    #Nutrition  -Regular diet Consistent carb/no snack  -NO salt restriction    -#DVT PPx:   Heparin, SCDs, TEDs    #Case discussed in IDT rounds 8/15:  Patient on track for dc home with home care referral OT and PT 8/16/19 if medically stable. Currently supervision transfers, ambulation with RW, goal for mod independent, supervision/CG shower and stairs    Pending:   BMp 8/16, reviewed  CXR, reviewed ASSESSMENT/PLAN  Pt is a 59 year old Estonian and English speaking female with PMH of HTN, HLD, DM2 who presented 07/30/19 with near syncope, found to have 95% left main and 95% RCA lesions now s/p CABG 07/27/19 with subsequent gait instability, ADL impairments and functional impairments.    #S/p CABG 7/27/19  - Continue with comprehensive rehab program OT, PT. Discharge with home care referral,, patient and family agreeable  - post-surgical support bra PRN with improvement in pain, sternal discomfort  - Outpt f/u with Dr. Pallazo on dc    #CAD:  -ASA, Atorvastatin 40mg qhs, metoprolol 12.5mg qHS  -Cardiology oupt f/u with Dr. Juárez     #LEFT PLEURAL EFFUSION: reduced breath sounds left base on exam, O2 sat stable, no distress  -continue incentive spirometer  - 8/15 CXR: There is a moderate early large left effusion. Chest is similar to August 9  -continue lasix 40 mg daily. Na stable, K+ stable. need to f/u with cardiology/PCP for periodic monitoring and cessation of lasix discussed with patient    #HTN  - Metoprolol 12.5mg qhs    #Diabetes mellitus, type II  -Humalog 2u pre-meal, SSI  -Metformin 1000mg PO bid    #hyponatremia  -dc'd salt restriction in diet on 8/13.   - 8/16 Na 131  -monitor as outpatient, on lasix    #Anemia, improving  -Monitor CBC-->9.6 (8/12)  -CBC ordered for 8/15--> 9.7 stable    #Pain control  -Tylenol PRN,  -dc percocet 8/15    #Hyperlipidemia  -Atorvastatin 40mg qhs    #GERD  - Protonix daily   -simethicone daily   -sit up at least 30 minutes post meals    #derm  clotrimazole bid ordered for fungal rash    #Mood  -Lexapro  -neuropsychology evaluation mood, supportive counseling 8/6    #Nutrition  -Regular diet Consistent carb/no snack  -NO salt restriction    -Patient was scheduled for dc tomorrow, however participated in caregiver training today and would like dc home today. Medically cleared for dc 9/16/19 with home care referral PT and OT, SW /CM aware ASSESSMENT/PLAN  Pt is a 59 year old Amharic and English speaking female with PMH of HTN, HLD, DM2 who presented 07/30/19 with near syncope, found to have 95% left main and 95% RCA lesions now s/p CABG 07/27/19 with subsequent gait instability, ADL impairments and functional impairments.    #S/p CABG 7/27/19  - Continue with comprehensive rehab program OT, PT. Discharge with home care referral,, patient and family agreeable  - post-surgical support bra PRN with improvement in pain, sternal discomfort  - Outpt f/u with Dr. Pallazo on dc    #CAD:  -ASA, Atorvastatin 40mg qhs, metoprolol 12.5mg qHS  -Cardiology oupt f/u with Dr. Juárez     #LEFT PLEURAL EFFUSION: reduced breath sounds left base on exam, O2 sat stable, no distress  -continue incentive spirometer  - 8/15 CXR: There is a moderate early large left effusion. Chest is similar to August 9  -continue lasix 40 mg daily. Na stable, K+ stable. need to f/u with cardiology/PCP for periodic monitoring and cessation of lasix discussed with patient    #HTN  - Metoprolol 12.5mg qhs    #Diabetes mellitus, type II  -Humalog 2u pre-meal, SSI  -Metformin 1000mg PO bid    #hyponatremia  -dc'd salt restriction in diet on 8/13.   - 8/16 Na 131  -monitor as outpatient, on lasix    #Anemia, improving  -Monitor CBC-->9.6 (8/12)  -CBC ordered for 8/15--> 9.7 stable    #Pain control  -Tylenol PRN,  -dc percocet 8/15    #Hyperlipidemia  -Atorvastatin 40mg qhs    #GERD  - Protonix daily   -simethicone daily   -sit up at least 30 minutes post meals    #derm  clotrimazole bid ordered for fungal rash    #Mood  -Lexapro  -neuropsychology evaluation mood, supportive counseling 8/6    #Nutrition  -Regular diet Consistent carb/no snack  -NO salt restriction    -Patient was scheduled for dc tomorrow, however participated in caregiver training today and would like dc home today. Medically cleared for dc 9/16/19 with home care referral PT and OT, SW /CM aware    ADDENDUM: family now states they are unable to give patient a ride home this afternoon and will take her home as originally scheduled 8/17/19 (8/16/19 3:25 PM)

## 2019-08-16 NOTE — PROGRESS NOTE ADULT - SUBJECTIVE AND OBJECTIVE BOX
Patient is a 59y old  Female who presents with a chief complaint of gait instability, ADL impairments and functional impairments s/p CABG 19 (16 Aug 2019 11:36)      HPI:  Pt is a 59 year old Yi and English speaking female with PMH of HTN, HLD, DM2 who presented to [facility] ED  with near syncope after arguing with her daughter. Patient also had acute onset nausea, diaphoresis, palpitations, dizziness, and mild chest pressure. NO chest pain, SOB, AGARWAL, PND, orthopnea, increased lower extremity edema, fever/chills, cough. The patient had cath at [facility] which showed 95% left main and 95% RCA lesions and she was transferred to [facility] for CABG on  with Dr. Ramey. Hospital course sig. for chest tube placement (DC'd ), RLE ghassan (DC'd ), small R thigh hematoma () and afebrile leukocytosis to 11 (). Admitted to [facility] for gait instability, ADL impairments and functional impairments s/p CABG. (02 Aug 2019 12:13)        SUBJECTIVE: Patient seen and examined at bedside with Yi . No acute overnight events. Patient expresses that she is overall feeling better and denies pain, says she is very thankful of care she received here. Denies CP, palpitations, SOB.       PAST MEDICAL & SURGICAL HISTORY:  Gall stone pancreatitis: S/P laparoscopic cholecystectomy  Low back pain  Diabetes mellitus, type II  Benign hypertension  History of cholecystectomy: laparoscopic cholecystectomy  H/O:       MEDICATIONS  (STANDING):  ascorbic acid 500 milliGRAM(s) Oral two times a day  aspirin  chewable 81 milliGRAM(s) Oral daily  atorvastatin 40 milliGRAM(s) Oral at bedtime  BACItracin   Ointment 1 Application(s) Topical two times a day  clotrimazole 1% Cream 1 Application(s) Topical two times a day  dextrose 5%. 1000 milliLiter(s) (50 mL/Hr) IV Continuous <Continuous>  dextrose 50% Injectable 12.5 Gram(s) IV Push once  dextrose 50% Injectable 25 Gram(s) IV Push once  dextrose 50% Injectable 25 Gram(s) IV Push once  escitalopram 10 milliGRAM(s) Oral daily  ferrous    sulfate 325 milliGRAM(s) Oral two times a day  furosemide    Tablet 40 milliGRAM(s) Oral daily  heparin  Injectable 5000 Unit(s) SubCutaneous every 8 hours  insulin glargine Injectable (LANTUS) 10 Unit(s) SubCutaneous at bedtime  insulin lispro (HumaLOG) corrective regimen sliding scale   SubCutaneous three times a day before meals  insulin lispro (HumaLOG) corrective regimen sliding scale   SubCutaneous at bedtime  metFORMIN 1000 milliGRAM(s) Oral two times a day  metoprolol tartrate 12.5 milliGRAM(s) Oral <User Schedule>  multivitamin 1 Tablet(s) Oral daily  pantoprazole    Tablet 40 milliGRAM(s) Oral before breakfast  simethicone 80 milliGRAM(s) Chew daily    MEDICATIONS  (PRN):  acetaminophen   Tablet .. 650 milliGRAM(s) Oral every 6 hours PRN Temp greater or equal to 38C (100.4F), Mild Pain (1 - 3), Moderate Pain (4 - 6)  dextrose 40% Gel 15 Gram(s) Oral once PRN Blood Glucose LESS THAN 70 milliGRAM(s)/deciliter  docusate sodium 100 milliGRAM(s) Oral two times a day PRN Constipation  famotidine    Tablet 20 milliGRAM(s) Oral daily PRN reflux  glucagon  Injectable 1 milliGRAM(s) IntraMuscular once PRN Glucose LESS THAN 70 milligrams/deciliter  polyethylene glycol 3350 17 Gram(s) Oral daily PRN Constipation  senna 2 Tablet(s) Oral at bedtime PRN Constipation      Allergies    metronidazole (Rash)    Intolerances    Norvasc (Swelling)        VITALS  59y  Vital Signs Last 24 Hrs  T(C): 36.7 (16 Aug 2019 09:06), Max: 36.7 (16 Aug 2019 06:30)  T(F): 98 (16 Aug 2019 09:06), Max: 98.1 (16 Aug 2019 06:30)  HR: 65 (16 Aug 2019 09:06) (65 - 73)  BP: 111/71 (16 Aug 2019 09:06) (104/66 - 111/71)  BP(mean): --  RR: 14 (16 Aug 2019 09:06) (14 - 14)  SpO2: 97% (16 Aug 2019 09:06) (97% - 98%)  Daily     Daily         RECENT LABS:                          9.7    4.90  )-----------( 448      ( 15 Aug 2019 05:25 )             30.4     08-16    131<L>  |  98  |  8   ----------------------------<  107<H>  4.4   |  24  |  0.54    Ca    8.6      16 Aug 2019 06:00  Mg     1.7     08-15                CAPILLARY BLOOD GLUCOSE      POCT Blood Glucose.: 116 mg/dL (16 Aug 2019 11:55)  POCT Blood Glucose.: 129 mg/dL (16 Aug 2019 07:28)  POCT Blood Glucose.: 108 mg/dL (15 Aug 2019 21:16)  POCT Blood Glucose.: 187 mg/dL (15 Aug 2019 17:09) Patient is a 59y old  Female who presents with a chief complaint of gait instability, ADL impairments and functional impairments s/p CABG 19 (16 Aug 2019 11:36)      HPI:  Pt is a 59 year old Latvian and English speaking female with PMH of HTN, HLD, DM2 who presented to [facility] ED  with near syncope after arguing with her daughter. Patient also had acute onset nausea, diaphoresis, palpitations, dizziness, and mild chest pressure. NO chest pain, SOB, AGARWAL, PND, orthopnea, increased lower extremity edema, fever/chills, cough. The patient had cath at [facility] which showed 95% left main and 95% RCA lesions and she was transferred to [facility] for CABG on  with Dr. Ramey. Hospital course sig. for chest tube placement (DC'd ), RLE ghassan (DC'd ), small R thigh hematoma () and afebrile leukocytosis to 11 (). Admitted to [facility] for gait instability, ADL impairments and functional impairments s/p CABG. (02 Aug 2019 12:13)        SUBJECTIVE: Patient seen and examined at bedside with Latvian . No acute overnight events. Patient expresses that she is overall feeling better and denies pain, says she is very thankful of care she received here. Denies CP, palpitations, SOB.       PAST MEDICAL & SURGICAL HISTORY:  Gall stone pancreatitis: S/P laparoscopic cholecystectomy  Low back pain  Diabetes mellitus, type II  Benign hypertension  History of cholecystectomy: laparoscopic cholecystectomy  H/O:       MEDICATIONS  (STANDING):  ascorbic acid 500 milliGRAM(s) Oral two times a day  aspirin  chewable 81 milliGRAM(s) Oral daily  atorvastatin 40 milliGRAM(s) Oral at bedtime  BACItracin   Ointment 1 Application(s) Topical two times a day  clotrimazole 1% Cream 1 Application(s) Topical two times a day  dextrose 5%. 1000 milliLiter(s) (50 mL/Hr) IV Continuous <Continuous>  dextrose 50% Injectable 12.5 Gram(s) IV Push once  dextrose 50% Injectable 25 Gram(s) IV Push once  dextrose 50% Injectable 25 Gram(s) IV Push once  escitalopram 10 milliGRAM(s) Oral daily  ferrous    sulfate 325 milliGRAM(s) Oral two times a day  furosemide    Tablet 40 milliGRAM(s) Oral daily  heparin  Injectable 5000 Unit(s) SubCutaneous every 8 hours  insulin glargine Injectable (LANTUS) 10 Unit(s) SubCutaneous at bedtime  insulin lispro (HumaLOG) corrective regimen sliding scale   SubCutaneous three times a day before meals  insulin lispro (HumaLOG) corrective regimen sliding scale   SubCutaneous at bedtime  metFORMIN 1000 milliGRAM(s) Oral two times a day  metoprolol tartrate 12.5 milliGRAM(s) Oral <User Schedule>  multivitamin 1 Tablet(s) Oral daily  pantoprazole    Tablet 40 milliGRAM(s) Oral before breakfast  simethicone 80 milliGRAM(s) Chew daily    MEDICATIONS  (PRN):  acetaminophen   Tablet .. 650 milliGRAM(s) Oral every 6 hours PRN Temp greater or equal to 38C (100.4F), Mild Pain (1 - 3), Moderate Pain (4 - 6)  dextrose 40% Gel 15 Gram(s) Oral once PRN Blood Glucose LESS THAN 70 milliGRAM(s)/deciliter  docusate sodium 100 milliGRAM(s) Oral two times a day PRN Constipation  famotidine    Tablet 20 milliGRAM(s) Oral daily PRN reflux  glucagon  Injectable 1 milliGRAM(s) IntraMuscular once PRN Glucose LESS THAN 70 milligrams/deciliter  polyethylene glycol 3350 17 Gram(s) Oral daily PRN Constipation  senna 2 Tablet(s) Oral at bedtime PRN Constipation      Allergies    metronidazole (Rash)    Intolerances    Norvasc (Swelling)        VITALS  59y  Vital Signs Last 24 Hrs  T(C): 36.7 (16 Aug 2019 09:06), Max: 36.7 (16 Aug 2019 06:30)  T(F): 98 (16 Aug 2019 09:06), Max: 98.1 (16 Aug 2019 06:30)  HR: 65 (16 Aug 2019 09:06) (65 - 73)  BP: 111/71 (16 Aug 2019 09:06) (104/66 - 111/71)  BP(mean): --  RR: 14 (16 Aug 2019 09:06) (14 - 14)  SpO2: 97% (16 Aug 2019 09:06) (97% - 98%)  Daily     Daily         RECENT LABS:                          9.7    4.90  )-----------( 448      ( 15 Aug 2019 05:25 )             30.4     08-16    131<L>  |  98  |  8   ----------------------------<  107<H>  4.4   |  24  |  0.54    Ca    8.6      16 Aug 2019 06:00  Mg     1.7     08-15                CAPILLARY BLOOD GLUCOSE      POCT Blood Glucose.: 116 mg/dL (16 Aug 2019 11:55)  POCT Blood Glucose.: 129 mg/dL (16 Aug 2019 07:28)  POCT Blood Glucose.: 108 mg/dL (15 Aug 2019 21:16)  POCT Blood Glucose.: 187 mg/dL (15 Aug 2019 17:09)

## 2019-08-16 NOTE — PROGRESS NOTE ADULT - PROBLEM SELECTOR PLAN 2
left pleural effusion, clinically stable, follow up repeat cxr as per primary team  continue incentive spirometry

## 2019-08-16 NOTE — PROGRESS NOTE ADULT - EXTREMITIES COMMENTS
BL Calves nontender to palpation. BL Calves nontender to palpation. +soft, no erythema or warmth  no pedal edema

## 2019-08-16 NOTE — PROGRESS NOTE ADULT - ASSESSMENT
Pt is a 59 year old Occitan and English speaking female with PMH of HTN, HLD, DM2 who presented 07/30/19 with near syncope, found to have 95% left main and 95% RCA lesions now s/p CABG 07/27/19 with subsequent gait instability, ADL impairments and functional impairments.

## 2019-08-17 VITALS
SYSTOLIC BLOOD PRESSURE: 105 MMHG | TEMPERATURE: 98 F | DIASTOLIC BLOOD PRESSURE: 68 MMHG | HEART RATE: 75 BPM | RESPIRATION RATE: 14 BRPM | OXYGEN SATURATION: 96 %

## 2019-08-17 PROCEDURE — 80048 BASIC METABOLIC PNL TOTAL CA: CPT

## 2019-08-17 PROCEDURE — 80053 COMPREHEN METABOLIC PANEL: CPT

## 2019-08-17 PROCEDURE — 93005 ELECTROCARDIOGRAM TRACING: CPT

## 2019-08-17 PROCEDURE — 71045 X-RAY EXAM CHEST 1 VIEW: CPT

## 2019-08-17 PROCEDURE — 97167 OT EVAL HIGH COMPLEX 60 MIN: CPT

## 2019-08-17 PROCEDURE — 83735 ASSAY OF MAGNESIUM: CPT

## 2019-08-17 PROCEDURE — 97530 THERAPEUTIC ACTIVITIES: CPT

## 2019-08-17 PROCEDURE — 97535 SELF CARE MNGMENT TRAINING: CPT

## 2019-08-17 PROCEDURE — 97110 THERAPEUTIC EXERCISES: CPT

## 2019-08-17 PROCEDURE — 82746 ASSAY OF FOLIC ACID SERUM: CPT

## 2019-08-17 PROCEDURE — 82607 VITAMIN B-12: CPT

## 2019-08-17 PROCEDURE — 85027 COMPLETE CBC AUTOMATED: CPT

## 2019-08-17 PROCEDURE — 84484 ASSAY OF TROPONIN QUANT: CPT

## 2019-08-17 PROCEDURE — 97116 GAIT TRAINING THERAPY: CPT

## 2019-08-17 PROCEDURE — 87086 URINE CULTURE/COLONY COUNT: CPT

## 2019-08-17 PROCEDURE — 82962 GLUCOSE BLOOD TEST: CPT

## 2019-08-17 PROCEDURE — 97163 PT EVAL HIGH COMPLEX 45 MIN: CPT

## 2019-08-17 PROCEDURE — 99238 HOSP IP/OBS DSCHRG MGMT 30/<: CPT

## 2019-08-17 RX ORDER — SIMETHICONE 80 MG/1
1 TABLET, CHEWABLE ORAL
Qty: 30 | Refills: 0
Start: 2019-08-17

## 2019-08-17 RX ORDER — FAMOTIDINE 10 MG/ML
1 INJECTION INTRAVENOUS
Qty: 60 | Refills: 0
Start: 2019-08-17

## 2019-08-17 RX ORDER — METFORMIN HYDROCHLORIDE 850 MG/1
1 TABLET ORAL
Qty: 60 | Refills: 0
Start: 2019-08-17

## 2019-08-17 RX ORDER — ASCORBIC ACID 60 MG
1 TABLET,CHEWABLE ORAL
Qty: 60 | Refills: 0
Start: 2019-08-17

## 2019-08-17 RX ADMIN — Medication 1 APPLICATION(S): at 09:03

## 2019-08-17 RX ADMIN — Medication 325 MILLIGRAM(S): at 06:57

## 2019-08-17 RX ADMIN — Medication 40 MILLIGRAM(S): at 06:57

## 2019-08-17 RX ADMIN — Medication 81 MILLIGRAM(S): at 11:53

## 2019-08-17 RX ADMIN — Medication 1 TABLET(S): at 11:53

## 2019-08-17 RX ADMIN — METFORMIN HYDROCHLORIDE 1000 MILLIGRAM(S): 850 TABLET ORAL at 06:57

## 2019-08-17 RX ADMIN — ESCITALOPRAM OXALATE 10 MILLIGRAM(S): 10 TABLET, FILM COATED ORAL at 11:53

## 2019-08-17 RX ADMIN — Medication 650 MILLIGRAM(S): at 09:08

## 2019-08-17 RX ADMIN — Medication 2: at 11:53

## 2019-08-17 RX ADMIN — Medication 650 MILLIGRAM(S): at 09:30

## 2019-08-17 RX ADMIN — Medication 1 APPLICATION(S): at 06:57

## 2019-08-17 RX ADMIN — SIMETHICONE 80 MILLIGRAM(S): 80 TABLET, CHEWABLE ORAL at 11:53

## 2019-08-17 RX ADMIN — HEPARIN SODIUM 5000 UNIT(S): 5000 INJECTION INTRAVENOUS; SUBCUTANEOUS at 13:39

## 2019-08-17 RX ADMIN — Medication 500 MILLIGRAM(S): at 06:57

## 2019-08-17 RX ADMIN — HEPARIN SODIUM 5000 UNIT(S): 5000 INJECTION INTRAVENOUS; SUBCUTANEOUS at 06:57

## 2019-08-17 RX ADMIN — PANTOPRAZOLE SODIUM 40 MILLIGRAM(S): 20 TABLET, DELAYED RELEASE ORAL at 06:57

## 2019-08-17 NOTE — PROGRESS NOTE ADULT - GASTROINTESTINAL DETAILS
soft/nontender/normal
nontender/no guarding/soft
no distention/bowel sounds normal/soft/nontender
soft/no distention/bowel sounds normal/nontender
nontender/soft/normal
no distention/nontender/bowel sounds normal/soft
nontender/soft/no distention/bowel sounds normal
no distention/soft/nontender/bowel sounds normal
nontender/soft/no distention/bowel sounds normal

## 2019-08-17 NOTE — PROGRESS NOTE ADULT - CARDIOVASCULAR COMMENTS
+sternal incision site, healing well, skin approximated, C/D/I, dried surgical glue, no erythema, swelling or discharge. Nontender to palpation. +surgical drain site, healing well. C/D/I, no erythema, swelling or discharge.

## 2019-08-17 NOTE — PROGRESS NOTE ADULT - ASSESSMENT
ASSESSMENT/PLAN  Pt is a 59 year old Japanese and English speaking female with PMH of HTN, HLD, DM2 who presented 07/30/19 with near syncope, found to have 95% left main and 95% RCA lesions now s/p CABG 07/27/19 with subsequent gait instability, ADL impairments and functional impairments.    #S/p CABG 7/27/19  - Continue with comprehensive rehab program OT, PT. Discharge with home care referral,, patient and family agreeable  - post-surgical support bra PRN with improvement in pain, sternal discomfort  - Outpt f/u with Dr. Pallazo on dc    #CAD:  -ASA, Atorvastatin 40mg qhs, metoprolol 12.5mg qHS  -Cardiology oupt f/u with Dr. Juárez     #LEFT PLEURAL EFFUSION: reduced breath sounds left base on exam, O2 sat stable, no distress  -continue incentive spirometer  - 8/15 CXR: There is a moderate early large left effusion. Chest is similar to August 9  -continue lasix 40 mg daily. Na stable, K+ stable. need to f/u with cardiology/PCP for periodic monitoring and cessation of lasix discussed with patient    #HTN  - Metoprolol 12.5mg qhs    #Diabetes mellitus, type II  -Humalog 2u pre-meal, SSI  -Metformin 1000mg PO bid    #hyponatremia  -dc'd salt restriction in diet on 8/13.   - 8/16 Na 131  -monitor as outpatient, on lasix    #Anemia, improving  -Monitor CBC-->9.6 (8/12)  -CBC ordered for 8/15--> 9.7 stable    #Pain control  -Tylenol PRN,  -dc percocet 8/15    #Hyperlipidemia  -Atorvastatin 40mg qhs    #GERD  - Protonix daily   -simethicone daily   -sit up at least 30 minutes post meals    #derm  clotrimazole bid ordered for fungal rash    #Mood  -Lexapro  -neuropsychology evaluation mood, supportive counseling 8/6    #Nutrition  -Regular diet Consistent carb/no snack  -NO salt restriction    - Medically cleared for dc 8/17/19 with home care referral PT and OT, SW /CM aware

## 2019-08-17 NOTE — PROGRESS NOTE ADULT - SUBJECTIVE AND OBJECTIVE BOX
Patient is a 59y old  Female who presents with a chief complaint of gait instability, ADL impairments and functional impairments s/p CABG 19 (16 Aug 2019 12:27)      HPI:  Pt is a 59 year old Polish and English speaking female with PMH of HTN, HLD, DM2 who presented to [facility] ED  with near syncope after arguing with her daughter. Patient also had acute onset nausea, diaphoresis, palpitations, dizziness, and mild chest pressure. NO chest pain, SOB, AGARWAL, PND, orthopnea, increased lower extremity edema, fever/chills, cough. The patient had cath at [facility] which showed 95% left main and 95% RCA lesions and she was transferred to [facility] for CABG on  with Dr. Ramey. Hospital course sig. for chest tube placement (DC'd ), RLE ghassan (DC'd ), small R thigh hematoma () and afebrile leukocytosis to 11 (). Admitted to [facility] for gait instability, ADL impairments and functional impairments s/p CABG. (02 Aug 2019 12:13)        SUBJECTIVE: Patient seen and examined. No acute overnight events. Patient states she slept well and feels better overall. Is excited to go home today. No other complaints.       PAST MEDICAL & SURGICAL HISTORY:  Gall stone pancreatitis: S/P laparoscopic cholecystectomy  Low back pain  Diabetes mellitus, type II  Benign hypertension  History of cholecystectomy: laparoscopic cholecystectomy  H/O:       MEDICATIONS  (STANDING):  ascorbic acid 500 milliGRAM(s) Oral two times a day  aspirin  chewable 81 milliGRAM(s) Oral daily  atorvastatin 40 milliGRAM(s) Oral at bedtime  BACItracin   Ointment 1 Application(s) Topical two times a day  clotrimazole 1% Cream 1 Application(s) Topical two times a day  dextrose 5%. 1000 milliLiter(s) (50 mL/Hr) IV Continuous <Continuous>  dextrose 50% Injectable 12.5 Gram(s) IV Push once  dextrose 50% Injectable 25 Gram(s) IV Push once  dextrose 50% Injectable 25 Gram(s) IV Push once  escitalopram 10 milliGRAM(s) Oral daily  ferrous    sulfate 325 milliGRAM(s) Oral two times a day  furosemide    Tablet 40 milliGRAM(s) Oral daily  heparin  Injectable 5000 Unit(s) SubCutaneous every 8 hours  insulin glargine Injectable (LANTUS) 10 Unit(s) SubCutaneous at bedtime  insulin lispro (HumaLOG) corrective regimen sliding scale   SubCutaneous three times a day before meals  insulin lispro (HumaLOG) corrective regimen sliding scale   SubCutaneous at bedtime  metFORMIN 1000 milliGRAM(s) Oral two times a day  metoprolol tartrate 12.5 milliGRAM(s) Oral <User Schedule>  multivitamin 1 Tablet(s) Oral daily  pantoprazole    Tablet 40 milliGRAM(s) Oral before breakfast  simethicone 80 milliGRAM(s) Chew daily    MEDICATIONS  (PRN):  acetaminophen   Tablet .. 650 milliGRAM(s) Oral every 6 hours PRN Temp greater or equal to 38C (100.4F), Mild Pain (1 - 3), Moderate Pain (4 - 6)  dextrose 40% Gel 15 Gram(s) Oral once PRN Blood Glucose LESS THAN 70 milliGRAM(s)/deciliter  docusate sodium 100 milliGRAM(s) Oral two times a day PRN Constipation  famotidine    Tablet 20 milliGRAM(s) Oral daily PRN reflux  glucagon  Injectable 1 milliGRAM(s) IntraMuscular once PRN Glucose LESS THAN 70 milligrams/deciliter  polyethylene glycol 3350 17 Gram(s) Oral daily PRN Constipation  senna 2 Tablet(s) Oral at bedtime PRN Constipation      Allergies    metronidazole (Rash)    Intolerances    Norvasc (Swelling)        VITALS  59y  Vital Signs Last 24 Hrs  T(C): 36.6 (17 Aug 2019 08:25), Max: 36.8 (16 Aug 2019 20:53)  T(F): 97.9 (17 Aug 2019 08:25), Max: 98.3 (16 Aug 2019 20:53)  HR: 75 (17 Aug 2019 08:25) (70 - 75)  BP: 105/68 (17 Aug 2019 08:25) (99/60 - 105/68)  BP(mean): --  RR: 14 (17 Aug 2019 08:25) (14 - 15)  SpO2: 96% (17 Aug 2019 08:25) (96% - 99%)  Daily     Daily         RECENT LABS:      08-    131<L>  |  98  |  8   ----------------------------<  107<H>  4.4   |  24  |  0.54    Ca    8.6      16 Aug 2019 06:00                CAPILLARY BLOOD GLUCOSE      POCT Blood Glucose.: 226 mg/dL (17 Aug 2019 11:48)  POCT Blood Glucose.: 139 mg/dL (17 Aug 2019 07:58)  POCT Blood Glucose.: 152 mg/dL (16 Aug 2019 20:55)  POCT Blood Glucose.: 142 mg/dL (16 Aug 2019 16:52)

## 2019-08-17 NOTE — PROGRESS NOTE ADULT - GENERAL COMMENTS
Patient seen during breakfast, NAD. Patient expresses happiness at coming to rehab, and notes feeling better

## 2019-08-17 NOTE — PROGRESS NOTE ADULT - COMMENTS
mood significantly improved, brighter affect, still fatigued but less than before
Vanessa  Jessica #[id number]
seen with son Cali translating in [facility]
mood significantly improved, brighter affect, still fatigued but less than before

## 2019-08-17 NOTE — PROGRESS NOTE ADULT - RS GEN PE MLT RESP DETAILS PC
clear to auscultation bilaterally/no wheezes/breath sounds equal/respirations non-labored
no rales/diminished breath sounds, L/no rhonchi
respirations non-labored/clear to auscultation bilaterally/no wheezes
respirations non-labored/clear to auscultation bilaterally/no wheezes
respirations non-labored/clear to auscultation bilaterally/breath sounds equal/no wheezes
chest wall tenderness/breath sounds equal/respirations non-labored/clear to auscultation bilaterally/no wheezes
fair effort +kyphotic posture/respirations non-labored/no rales/no rhonchi/no wheezes/clear to auscultation bilaterally
clear to auscultation bilaterally/respirations non-labored
no rales/respirations non-labored/no rhonchi/no wheezes

## 2019-08-17 NOTE — PROGRESS NOTE ADULT - CVS HE PE MLT D E PC
regular rate and rhythm
no murmur/regular rate and rhythm
regular rate and rhythm

## 2019-08-17 NOTE — PROGRESS NOTE ADULT - NEGATIVE CARDIOVASCULAR SYMPTOMS
no chest pain/no claudication/no palpitations/no dyspnea on exertion
no chest pain/no palpitations/no peripheral edema
no dyspnea on exertion/no palpitations
no palpitations/no dyspnea on exertion/no chest pain/no claudication
no palpitations/no dyspnea on exertion/no claudication
no chest pain/no peripheral edema/no palpitations

## 2019-08-17 NOTE — PROGRESS NOTE ADULT - REASON FOR ADMISSION
gait instability, ADL impairments and functional impairments s/p CABG 07/27/19

## 2019-08-17 NOTE — PROGRESS NOTE ADULT - ATTENDING COMMENTS
Pt. seen this AM.  Agree with documentation above as per resident.   Patient medically stable for discharge to home.  Discharge medications and instructions reviewed by primary team.

## 2019-08-17 NOTE — PROGRESS NOTE ADULT - PROVIDER SPECIALTY LIST ADULT
Cardiology
Hospitalist
Physiatry
Rehab Medicine
Physiatry
Rehab Medicine
Hospitalist
Physiatry

## 2019-08-30 ENCOUNTER — RECORD ABSTRACTING (OUTPATIENT)
Age: 59
End: 2019-08-30

## 2019-08-30 DIAGNOSIS — Z80.9 FAMILY HISTORY OF MALIGNANT NEOPLASM, UNSPECIFIED: ICD-10-CM

## 2019-08-30 DIAGNOSIS — R07.89 OTHER CHEST PAIN: ICD-10-CM

## 2019-08-30 DIAGNOSIS — R55 SYNCOPE AND COLLAPSE: ICD-10-CM

## 2019-08-30 DIAGNOSIS — Z87.898 PERSONAL HISTORY OF OTHER SPECIFIED CONDITIONS: ICD-10-CM

## 2019-08-30 DIAGNOSIS — E78.5 HYPERLIPIDEMIA, UNSPECIFIED: ICD-10-CM

## 2019-08-30 DIAGNOSIS — I10 ESSENTIAL (PRIMARY) HYPERTENSION: ICD-10-CM

## 2019-08-30 DIAGNOSIS — Z83.3 FAMILY HISTORY OF DIABETES MELLITUS: ICD-10-CM

## 2019-08-30 DIAGNOSIS — I25.10 ATHEROSCLEROTIC HEART DISEASE OF NATIVE CORONARY ARTERY W/OUT ANGINA PECTORIS: ICD-10-CM

## 2019-08-30 DIAGNOSIS — Z78.9 OTHER SPECIFIED HEALTH STATUS: ICD-10-CM

## 2019-08-30 DIAGNOSIS — Z86.79 PERSONAL HISTORY OF OTHER DISEASES OF THE CIRCULATORY SYSTEM: ICD-10-CM

## 2019-08-30 RX ORDER — METFORMIN HYDROCHLORIDE 1000 MG/1
1000 TABLET, COATED ORAL TWICE DAILY
Refills: 0 | Status: ACTIVE | COMMUNITY

## 2019-08-30 RX ORDER — HEPARIN SODIUM 5000 [USP'U]/ML
5000 INJECTION, SOLUTION INTRAVENOUS; SUBCUTANEOUS EVERY 8 HOURS
Refills: 0 | Status: ACTIVE | COMMUNITY

## 2019-08-30 RX ORDER — ESCITALOPRAM OXALATE 10 MG/1
10 TABLET, FILM COATED ORAL DAILY
Refills: 0 | Status: ACTIVE | COMMUNITY

## 2019-08-30 RX ORDER — INSULIN LISPRO 100 [IU]/ML
100 INJECTION, SOLUTION INTRAVENOUS; SUBCUTANEOUS 3 TIMES DAILY
Refills: 0 | Status: ACTIVE | COMMUNITY

## 2019-08-30 RX ORDER — DOCUSATE SODIUM 100 MG/1
100 CAPSULE, LIQUID FILLED ORAL 3 TIMES DAILY
Refills: 0 | Status: ACTIVE | COMMUNITY

## 2019-08-30 RX ORDER — METOPROLOL TARTRATE 25 MG/1
25 TABLET, FILM COATED ORAL TWICE DAILY
Refills: 0 | Status: ACTIVE | COMMUNITY

## 2019-08-30 RX ORDER — FAMOTIDINE 20 MG/1
20 TABLET, FILM COATED ORAL TWICE DAILY
Refills: 0 | Status: ACTIVE | COMMUNITY

## 2019-08-30 RX ORDER — ATORVASTATIN CALCIUM 40 MG/1
40 TABLET, FILM COATED ORAL
Refills: 0 | Status: ACTIVE | COMMUNITY

## 2019-08-30 RX ORDER — LORATADINE 5 MG
17 TABLET,CHEWABLE ORAL DAILY
Refills: 0 | Status: ACTIVE | COMMUNITY

## 2019-09-04 ENCOUNTER — APPOINTMENT (OUTPATIENT)
Dept: CARDIOTHORACIC SURGERY | Facility: CLINIC | Age: 59
End: 2019-09-04

## 2019-09-04 VITALS
SYSTOLIC BLOOD PRESSURE: 147 MMHG | OXYGEN SATURATION: 95 % | HEART RATE: 66 BPM | RESPIRATION RATE: 12 BRPM | TEMPERATURE: 98 F | DIASTOLIC BLOOD PRESSURE: 79 MMHG

## 2019-09-16 PROBLEM — Z09 POSTOP CHECK: Status: ACTIVE | Noted: 2019-08-30

## 2019-09-16 PROBLEM — E11.9 DIABETES MELLITUS, TYPE 2: Status: ACTIVE | Noted: 2019-08-30

## 2019-09-18 ENCOUNTER — APPOINTMENT (OUTPATIENT)
Dept: CARDIOTHORACIC SURGERY | Facility: CLINIC | Age: 59
End: 2019-09-18

## 2019-09-18 DIAGNOSIS — Z09 ENCOUNTER FOR FOLLOW-UP EXAMINATION AFTER COMPLETED TREATMENT FOR CONDITIONS OTHER THAN MALIGNANT NEOPLASM: ICD-10-CM

## 2019-09-18 DIAGNOSIS — E11.9 TYPE 2 DIABETES MELLITUS W/OUT COMPLICATIONS: ICD-10-CM

## 2019-10-27 NOTE — PROGRESS NOTE ADULT - SUBJECTIVE AND OBJECTIVE BOX
Name band; Patient is a 59y old  Female who presents with a chief complaint of gait instability, ADL impairments and functional impairments s/p CABG 19 (07 Aug 2019 15:26)      HPI:  Pt is a 59 year old Croatian and English speaking female with PMH of HTN, HLD, DM2 who presented to [facility] ED  with near syncope after arguing with her daughter. Patient also had acute onset nausea, diaphoresis, palpitations, dizziness, and mild chest pressure. NO chest pain, SOB, AGARWAL, PND, orthopnea, increased lower extremity edema, fever/chills, cough. The patient had cath at [facility] which showed 95% left main and 95% RCA lesions and she was transferred to [facility] for CABG on  with Dr. Ramey. Hospital course sig. for chest tube placement (DC'd ), RLE ghassan (DC'd ), small R thigh hematoma () and afebrile leukocytosis to 11 (). Admitted to [facility] for gait instability, ADL impairments and functional impairments s/p CABG. (02 Aug 2019 12:13)      PAST MEDICAL & SURGICAL HISTORY:  Gall stone pancreatitis: S/P laparoscopic cholecystectomy  Low back pain  Diabetes mellitus, type II  Benign hypertension  History of cholecystectomy: laparoscopic cholecystectomy  H/O:       MEDICATIONS  (STANDING):  ascorbic acid 500 milliGRAM(s) Oral daily  aspirin  chewable 81 milliGRAM(s) Oral daily  atorvastatin 40 milliGRAM(s) Oral at bedtime  dextrose 5%. 1000 milliLiter(s) (50 mL/Hr) IV Continuous <Continuous>  dextrose 50% Injectable 12.5 Gram(s) IV Push once  dextrose 50% Injectable 25 Gram(s) IV Push once  dextrose 50% Injectable 25 Gram(s) IV Push once  escitalopram 10 milliGRAM(s) Oral daily  ferrous    sulfate 325 milliGRAM(s) Oral two times a day  heparin  Injectable 5000 Unit(s) SubCutaneous every 8 hours  insulin glargine Injectable (LANTUS) 10 Unit(s) SubCutaneous at bedtime  insulin lispro (HumaLOG) corrective regimen sliding scale   SubCutaneous three times a day before meals  insulin lispro (HumaLOG) corrective regimen sliding scale   SubCutaneous at bedtime  metFORMIN 1000 milliGRAM(s) Oral two times a day  metoprolol tartrate 12.5 milliGRAM(s) Oral two times a day  pantoprazole    Tablet 40 milliGRAM(s) Oral before breakfast    MEDICATIONS  (PRN):  acetaminophen   Tablet .. 650 milliGRAM(s) Oral every 6 hours PRN Temp greater or equal to 38C (100.4F), Mild Pain (1 - 3)  dextrose 40% Gel 15 Gram(s) Oral once PRN Blood Glucose LESS THAN 70 milliGRAM(s)/deciliter  docusate sodium 100 milliGRAM(s) Oral two times a day PRN Constipation  famotidine    Tablet 20 milliGRAM(s) Oral daily PRN reflux  glucagon  Injectable 1 milliGRAM(s) IntraMuscular once PRN Glucose LESS THAN 70 milligrams/deciliter  oxyCODONE    5 mG/acetaminophen 325 mG 1 Tablet(s) Oral every 6 hours PRN Moderate Pain (4 - 6)  oxyCODONE    5 mG/acetaminophen 325 mG 2 Tablet(s) Oral every 6 hours PRN Severe Pain (7 - 10)  polyethylene glycol 3350 17 Gram(s) Oral daily PRN Constipation  senna 2 Tablet(s) Oral at bedtime PRN Constipation      Allergies    metronidazole (Rash)    Intolerances    Norvasc (Swelling)        VITALS  59y  Vital Signs Last 24 Hrs  T(C): 36.8 (08 Aug 2019 07:35), Max: 37.1 (07 Aug 2019 20:48)  T(F): 98.3 (08 Aug 2019 07:35), Max: 98.8 (07 Aug 2019 20:48)  HR: 70 (08 Aug 2019 07:35) (67 - 71)  BP: 92/59 (08 Aug 2019 07:35) (92/59 - 123/75)  BP(mean): --  RR: 14 (08 Aug 2019 07:35) (14 - 14)  SpO2: 97% (08 Aug 2019 07:35) (94% - 97%)  Daily     Daily Weight in k.5 (08 Aug 2019 06:17)        RECENT LABS:                          8.8    10.98 )-----------( 385      ( 08 Aug 2019 07:25 )             27.4         136  |  101  |  12  ----------------------------<  180<H>  4.0   |  27  |  0.58    Ca    8.7      08 Aug 2019 07:25  Mg     1.8                   Culture - Urine (collected 19 @ 11:30)  Source: .Urine Clean Catch (Midstream)  Final Report (19 @ 12:45):    <10,000 CFU/mL Normal Urogenital Lydia        CAPILLARY BLOOD GLUCOSE      POCT Blood Glucose.: 152 mg/dL (08 Aug 2019 11:58)  POCT Blood Glucose.: 185 mg/dL (08 Aug 2019 07:45)  POCT Blood Glucose.: 174 mg/dL (07 Aug 2019 21:47)  POCT Blood Glucose.: 175 mg/dL (07 Aug 2019 16:52)

## 2020-02-04 PROBLEM — Z95.1 S/P CABG X 2: Status: ACTIVE | Noted: 2019-08-30

## 2020-02-05 ENCOUNTER — APPOINTMENT (OUTPATIENT)
Dept: CARDIOTHORACIC SURGERY | Facility: CLINIC | Age: 60
End: 2020-02-05
Payer: MEDICAID

## 2020-02-05 VITALS — HEIGHT: 60 IN | BODY MASS INDEX: 25.52 KG/M2 | WEIGHT: 130 LBS

## 2020-02-05 VITALS
DIASTOLIC BLOOD PRESSURE: 72 MMHG | OXYGEN SATURATION: 96 % | TEMPERATURE: 98.1 F | RESPIRATION RATE: 13 BRPM | HEART RATE: 62 BPM | SYSTOLIC BLOOD PRESSURE: 133 MMHG

## 2020-02-05 DIAGNOSIS — Z09 ENCOUNTER FOR FOLLOW-UP EXAMINATION AFTER COMPLETED TREATMENT FOR CONDITIONS OTHER THAN MALIGNANT NEOPLASM: ICD-10-CM

## 2020-02-05 DIAGNOSIS — Z95.1 PRESENCE OF AORTOCORONARY BYPASS GRAFT: ICD-10-CM

## 2020-02-05 PROCEDURE — 99212 OFFICE O/P EST SF 10 MIN: CPT

## 2020-02-05 NOTE — ASSESSMENT
[FreeTextEntry1] : Ken is a 59 year old female with PMH of HTN, HLD, DMT2, and CAD (s/p coronary artery bypass grafting x 2 with LIMA to LAD, reverse saphenous vein graft to the dRCA on 7/27/2019). She returns today due to concerns over healed sternotomy site chronic pain and discomfort.  She denies chest pain, palpitations or syncope. She reports pain at the chest incision. Her son reports she feels pain from the bone area. \par \par Plan:\par 1) Follow up with cardiologist\par 2) Incision is unremarkable and sternum is stable\par 3) PRN pain control

## 2020-02-05 NOTE — HISTORY OF PRESENT ILLNESS
[FreeTextEntry1] : Ken is a 59 year old female with PMH of HTN, HLD, DMT2, and CAD (s/p coronary artery bypass grafting x 2 with LIMA to LAD, reverse saphenous vein graft to the dRCA on 7/27/2019). She returns today due to concerns over sternal "bump." She denies chest pain, palpitations or syncope. She reports pain at the chest incision. Her son reports she jabari pain from the bone area.

## 2020-02-05 NOTE — CONSULT LETTER
[Courtesy Letter:] : I had the pleasure of seeing your patient, [unfilled], in my office today. [Dear  ___] : Dear ~ISABEL, [Consult Closing:] : Thank you very much for allowing me to participate in the care of this patient.  If you have any questions, please do not hesitate to contact me. [Please see my note below.] : Please see my note below. [Sincerely,] : Sincerely, [FreeTextEntry2] : Katarina Juárez M.D.\par [address] [place] [address]\[facility] [id number]\[place] [id number] [FreeTextEntry3] : Elio Chase MD\par \par Cardiovascular & Thoracic Surgery\par Professor\par [facility]\par \par

## 2020-02-05 NOTE — REVIEW OF SYSTEMS
[Feeling Tired] : feeling tired [Negative] : Heme/Lymph [Palpitations] : no palpitations [Chest Pain] : no chest pain [Dizziness] : no dizziness

## 2020-02-05 NOTE — REASON FOR VISIT
[Follow-Up: _____] : a [unfilled] follow-up visit [Family Member] : family member [FreeTextEntry1] : s/p CABG x 2 on July 25, 2019 complains of surgical site pain

## 2020-02-05 NOTE — PHYSICAL EXAM
[Sclera] : the sclera and conjunctiva were normal [PERRL With Normal Accommodation] : pupils were equal in size, round, and reactive to light [Neck Cervical Mass (___cm)] : no neck mass was observed [Neck Appearance] : the appearance of the neck was normal [Extraocular Movements] : extraocular movements were intact [Thyroid Diffuse Enlargement] : the thyroid was not enlarged [Jugular Venous Distention Increased] : there was no jugular-venous distention [] : no respiratory distress [Thyroid Nodule] : there were no palpable thyroid nodules [Auscultation Breath Sounds / Voice Sounds] : lungs were clear to auscultation bilaterally [Heart Rate And Rhythm] : heart rate was normal and rhythm regular [Surgical / Traumatic Scar Sternum] : a scar [Breast Appearance] : normal in appearance [Bowel Sounds] : normal bowel sounds [Abdomen Soft] : soft [Cervical Lymph Nodes Enlarged Posterior Bilaterally] : posterior cervical [No CVA Tenderness] : no ~M costovertebral angle tenderness [Cervical Lymph Nodes Enlarged Anterior Bilaterally] : anterior cervical [Involuntary Movements] : no involuntary movements were seen [Skin Turgor] : normal skin turgor [No Focal Deficits] : no focal deficits [Skin Color & Pigmentation] : normal skin color and pigmentation [Impaired Insight] : insight and judgment were intact [Oriented To Time, Place, And Person] : oriented to person, place, and time [Right Carotid Bruit] : no bruit heard over the right carotid [Left Carotid Bruit] : no bruit heard over the left carotid [FreeTextEntry1] : hypertrophic scar

## 2020-08-24 NOTE — ED ADULT NURSE NOTE - NSSUSCREENINGQ1_ED_ALL_ED
Enoxaparin/Lovenox increases your risk for bleeding. Notify your doctor if you experience any of the following side effects: unusual bleeding or bruising, coughing up or vomiting blood, red or black stool, blood in your urine, itching or hives, chest tightness, chest pain, shortness of breath, trouble breathing, swelling in your face or hands, swelling in your mouth or throat, fever, large flat blue or purplish patches on the skin, numbness or weakness in your arm or leg on one side, pain in your lower leg, sudden or severe headache with vision, speech or walking problems. When Enoxaparin/Lovenox is taken with other medicines, they can affect how it works. Taking other medications such as aspirin, blood thinners, and nonsteroidal anti-inflammatories increases your risk of bleeding. It is very important to tell your health care provider about all of the other medicines, including over-the-counter medications, herbs, and vitamins you are taking. DO NOT start, stop, or change the dosage of any medicine, including over-the-counter medicines, vitamins, and herbal products without your doctor’s approval. Any products containing aspirin or are nonsteroidal anti-inflammatories lessen the blood’s ability to form clots and adds to the effect of Enoxaparin/Lovenox. Never take aspirin or medicines that contain aspirin without speaking to your doctor. No

## 2021-09-08 NOTE — H&P ADULT - NSHPPOASURGSITEINCISION_GEN_ALL_CORE
Increase lisinopril to 20mg twice daily. Schedule a lab visit with blood pressure check in two weeks and a virtual visit with me about 2 days after that.   Schedule follow up with your Urologist.   See Pelon Bah PharmD to discuss options for quitting smoking .  Schedule follow up with cardiology.   
no

## 2022-09-20 NOTE — PATIENT PROFILE ADULT - NSPROGENOTHERPROVIDER_GEN_A_NUR
We will continue to monitor, did see rheumatology, if symptoms worsen will follow-up with rheumatology   none

## 2023-05-25 NOTE — PHYSICAL THERAPY INITIAL EVALUATION ADULT - PLANNED THERAPY INTERVENTIONS, PT EVAL
transfer training/balance training/bed mobility training/gait training/stairs: GOAL: Pt will negotiate 6 steps of stairs with or without appropriate assistive device and handrail via reciprocal/step-to technique indep in 2 weeks. Klisyri Counseling:  I discussed with the patient the risks of Klisyri including but not limited to erythema, scaling, itching, weeping, crusting, and pain.

## 2024-02-12 NOTE — ED PROVIDER NOTE - PSH
The wire was removed from the distal suprarenal abdominal aorta. H/O:     History of cholecystectomy  laparoscopic cholecystectomy

## 2025-07-21 NOTE — PRE-OP CHECKLIST - ISOLATION PRECAUTIONS
We already sent in a refill to the [facility] in [place] on 7/15/25.     Request denied since we already sent in a refill .   
Please advise refill request, see Dr. Cherry's last OV note and response from Holter results. Dr. Avery was last to prescribe this medication.              
none

## 2025-08-03 NOTE — ED PROVIDER NOTE - CLINICAL SUMMARY MEDICAL DECISION MAKING FREE TEXT BOX
"Intervention Initiated From:  COR / EICU    Bubba intervened regarding:  Rounding (Video assessment)    Virtual ICU Admission    Admit Date: 2025  LOS: 0  Code Status: Full Code   : 1951  Bed: W273/W273 A:     Diagnosis: <principal problem not specified>    Patient  has a past medical history of Bronchitis, Cancer, Diabetes mellitus, Fibroids, Nuclear sclerosis of both eyes, Osteopetrosis, and Uterine fibroid.    Last VS: /66 (BP Location: Right arm, Patient Position: Lying)   Pulse 67   Temp 97.8 °F (36.6 °C) (Oral)   Resp 16   Ht 5' 2" (1.575 m)   Wt 61.9 kg (136 lb 7.4 oz)   LMP  (LMP Unknown) Comment: [facility] BSO 17  SpO2 100%   BMI 24.96 kg/m²       VICU Review    VICU nurse assessment :  Rampart completed, LDA documentation reconciliation completed, and VTE prophylaxis review    No skin breakdown visualized    Nursing orders placed : IP JONATHAN Peripheral IV Access           " Shadi RYAN MD PGY2: Patient with signs and sxs of vasovagal syncope (known trigger, heated argument, rapid return to baseline) and chest discomfort. WIll r/o ACS as cause of syncope. If rpt trop negative, will d/c home.